# Patient Record
Sex: MALE | Race: WHITE | HISPANIC OR LATINO | Employment: OTHER | URBAN - METROPOLITAN AREA
[De-identification: names, ages, dates, MRNs, and addresses within clinical notes are randomized per-mention and may not be internally consistent; named-entity substitution may affect disease eponyms.]

---

## 2021-02-06 LAB
HEP C HIM: NORMAL
HIV 1&2 EXT: NORMAL

## 2021-05-28 ENCOUNTER — RECORDS - HEALTHEAST (OUTPATIENT)
Dept: ADMINISTRATIVE | Facility: CLINIC | Age: 66
End: 2021-05-28

## 2021-05-30 ENCOUNTER — RECORDS - HEALTHEAST (OUTPATIENT)
Dept: ADMINISTRATIVE | Facility: CLINIC | Age: 66
End: 2021-05-30

## 2021-05-31 ENCOUNTER — RECORDS - HEALTHEAST (OUTPATIENT)
Dept: ADMINISTRATIVE | Facility: CLINIC | Age: 66
End: 2021-05-31

## 2021-09-30 LAB — RETINOPATHY: NORMAL

## 2022-03-31 LAB
ALBUMIN (URINE) MG/SPEC: 9.3 MG/L
ALBUMIN/CREATININE RATIO: 10 MG/G
CHOLESTEROL (EXTERNAL): 108 MG/DL (ref 0–199)
CREATININE (URINE): 90 MG/DL
GLUCOSE (EXTERNAL): 112 MG/DL (ref 70–100)
HDLC SERPL-MCNC: 35 MG/DL
LDL CHOLESTEROL (EXTERNAL): 60 MG/DL
NON HDL CHOLESTEROL (EXTERNAL): 73 MG/DL
PHQ9 SCORE: 0
POTASSIUM (EXTERNAL): 4.6 MMOL/L (ref 3.5–5.1)
TRIGLYCERIDES (EXTERNAL): 63 MG/DL

## 2022-05-02 LAB
ALT SERPL-CCNC: 35 U/L (ref 0–55)
AST SERPL-CCNC: 24 U/L (ref 10–40)
CREATININE (EXTERNAL): 0.89 MG/DL (ref 0.73–1.18)
GFR ESTIMATED (EXTERNAL): >60 ML/MIN/1.73M2
INR (EXTERNAL): 1.2 (ref 0.9–1.1)

## 2022-07-06 ENCOUNTER — OFFICE VISIT (OUTPATIENT)
Dept: INTERNAL MEDICINE | Facility: CLINIC | Age: 67
End: 2022-07-06
Payer: COMMERCIAL

## 2022-07-06 VITALS
SYSTOLIC BLOOD PRESSURE: 147 MMHG | WEIGHT: 235.6 LBS | HEART RATE: 83 BPM | OXYGEN SATURATION: 99 % | DIASTOLIC BLOOD PRESSURE: 63 MMHG | BODY MASS INDEX: 30.05 KG/M2

## 2022-07-06 DIAGNOSIS — G25.0 TREMOR, ESSENTIAL: Primary | ICD-10-CM

## 2022-07-06 PROCEDURE — 99203 OFFICE O/P NEW LOW 30 MIN: CPT | Performed by: INTERNAL MEDICINE

## 2022-07-06 NOTE — PROGRESS NOTES
Assessment & Plan     (G25.0) Tremor, essential  (primary encounter diagnosis)  Comment: moderate-severe without much benefit from primidone as well as trial of sinemet.  Plan: Adult Neurology  Referral        He is interested in investigating options for DBS. Referral placed.                   Return in about 3 months (around 10/6/2022), or if symptoms worsen or fail to improve.  He intends to stay with his prior PCP in the  system.    Zurdo Booker MD  [facility]   Truman is a 66 year old accompanied by his spouse, presenting for the following health issues:    OTHER (Tremors-gotten worse)      History of Present Illness       Reason for visit:  Tremors    He eats 2-3 servings of fruits and vegetables daily.He consumes 0 sweetened beverage(s) daily.He exercises with enough effort to increase his heart rate 30 to 60 minutes per day.  He exercises with enough effort to increase his heart rate 3 or less days per week.   He is taking medications regularly.     He is looking for a referral for DBS at [facility] for his tremor. Has seen [place] for this but wants to stay local if possible          Review of Systems         Objective    BP (!) 147/63 (BP Location: Right arm, Patient Position: Sitting, Cuff Size: Adult Large)   Pulse 83   Wt 106.9 kg (235 lb 9.6 oz)   SpO2 99%   BMI 30.05 kg/m    Body mass index is 30.05 kg/m .  Physical Exam   Gen:nad  Neuro: postural tremor, right UE more than left. Moderate degree.                    .  ..

## 2022-07-07 ENCOUNTER — OFFICE VISIT (OUTPATIENT)
Dept: NEUROLOGY | Facility: CLINIC | Age: 67
End: 2022-07-07
Attending: INTERNAL MEDICINE
Payer: COMMERCIAL

## 2022-07-07 ENCOUNTER — PRE VISIT (OUTPATIENT)
Dept: NEUROLOGY | Facility: CLINIC | Age: 67
End: 2022-07-07

## 2022-07-07 VITALS
WEIGHT: 234.5 LBS | HEART RATE: 95 BPM | BODY MASS INDEX: 29.91 KG/M2 | SYSTOLIC BLOOD PRESSURE: 139 MMHG | DIASTOLIC BLOOD PRESSURE: 81 MMHG | OXYGEN SATURATION: 98 %

## 2022-07-07 DIAGNOSIS — Z98.890 H/O CAROTID ENDARTERECTOMY: ICD-10-CM

## 2022-07-07 DIAGNOSIS — Z13.6 SCREENING FOR HEART DISEASE: ICD-10-CM

## 2022-07-07 DIAGNOSIS — R25.1 TREMOR: ICD-10-CM

## 2022-07-07 DIAGNOSIS — G25.0 TREMOR, ESSENTIAL: ICD-10-CM

## 2022-07-07 DIAGNOSIS — R25.9 ABNORMAL INVOLUNTARY MOVEMENT: Primary | ICD-10-CM

## 2022-07-07 DIAGNOSIS — G20.A1 PARKINSON DISEASE (H): ICD-10-CM

## 2022-07-07 PROBLEM — I63.512 CEREBROVASCULAR ACCIDENT (CVA) DUE TO STENOSIS OF LEFT MIDDLE CEREBRAL ARTERY (H): Status: ACTIVE | Noted: 2019-09-20

## 2022-07-07 PROBLEM — M65.30 ACQUIRED TRIGGER FINGER: Status: ACTIVE | Noted: 2022-07-07

## 2022-07-07 PROBLEM — I65.22 STENOSIS OF LEFT CAROTID ARTERY: Status: ACTIVE | Noted: 2019-06-14

## 2022-07-07 PROBLEM — I63.9 CEREBROVASCULAR ACCIDENT (CVA), UNSPECIFIED MECHANISM (H): Status: ACTIVE | Noted: 2022-04-28

## 2022-07-07 PROBLEM — E11.9 DM TYPE 2 (DIABETES MELLITUS, TYPE 2) (H): Status: ACTIVE | Noted: 2017-02-20

## 2022-07-07 PROBLEM — G43.909 MIGRAINE HEADACHE: Status: ACTIVE | Noted: 2022-07-07

## 2022-07-07 PROBLEM — F31.10 BIPOLAR I DISORDER WITH MANIA (H): Status: ACTIVE | Noted: 2022-07-07

## 2022-07-07 PROBLEM — E66.9 OBESITY: Status: ACTIVE | Noted: 2022-07-07

## 2022-07-07 PROBLEM — D12.6 TUBULAR ADENOMA OF COLON: Status: ACTIVE | Noted: 2017-03-07

## 2022-07-07 PROBLEM — Z86.79 HISTORY OF ATRIAL FIBRILLATION: Status: ACTIVE | Noted: 2022-04-28

## 2022-07-07 PROBLEM — M19.90 DJD (DEGENERATIVE JOINT DISEASE): Status: ACTIVE | Noted: 2017-02-20

## 2022-07-07 PROBLEM — G56.00 CARPAL TUNNEL SYNDROME: Status: ACTIVE | Noted: 2022-07-07

## 2022-07-07 PROBLEM — F33.9 RECURRENT MAJOR DEPRESSIVE EPISODES (H): Status: ACTIVE | Noted: 2022-07-07

## 2022-07-07 PROBLEM — R39.9 LOWER URINARY TRACT SYMPTOMS (LUTS): Status: ACTIVE | Noted: 2022-07-07

## 2022-07-07 PROBLEM — Z79.01 ANTICOAGULATED: Status: ACTIVE | Noted: 2022-06-02

## 2022-07-07 PROBLEM — E78.5 HYPERLIPIDEMIA: Status: ACTIVE | Noted: 2017-02-20

## 2022-07-07 PROBLEM — Z86.19 HISTORY OF HEPATITIS C: Status: ACTIVE | Noted: 2017-02-20

## 2022-07-07 PROBLEM — M89.49 OSTEOARTHROSIS INVOLVING MULTIPLE SITES BUT NOT DESIGNATED AS GENERALIZED: Status: ACTIVE | Noted: 2022-07-07

## 2022-07-07 PROBLEM — J45.909 ASTHMA: Status: ACTIVE | Noted: 2022-07-07

## 2022-07-07 PROCEDURE — 99205 OFFICE O/P NEW HI 60 MIN: CPT | Performed by: PSYCHIATRY & NEUROLOGY

## 2022-07-07 PROCEDURE — 99417 PROLNG OP E/M EACH 15 MIN: CPT | Performed by: PSYCHIATRY & NEUROLOGY

## 2022-07-07 RX ORDER — ATORVASTATIN CALCIUM 40 MG/1
1 TABLET, FILM COATED ORAL DAILY
Status: ON HOLD | COMMUNITY
Start: 2021-03-22 | End: 2024-05-02

## 2022-07-07 RX ORDER — LISINOPRIL 10 MG/1
10 TABLET ORAL DAILY
COMMUNITY
Start: 2021-11-22 | End: 2022-07-07

## 2022-07-07 RX ORDER — POLYETHYLENE GLYCOL 3350 17 G/17G
17 POWDER, FOR SOLUTION ORAL DAILY PRN
COMMUNITY

## 2022-07-07 RX ORDER — GLIPIZIDE 5 MG/1
TABLET, FILM COATED, EXTENDED RELEASE ORAL
COMMUNITY
Start: 2021-08-24 | End: 2022-07-07

## 2022-07-07 RX ORDER — LISINOPRIL 20 MG/1
1 TABLET ORAL DAILY
COMMUNITY
Start: 2021-12-13 | End: 2023-02-27

## 2022-07-07 RX ORDER — INSULIN GLARGINE 100 [IU]/ML
25 INJECTION, SOLUTION SUBCUTANEOUS DAILY
COMMUNITY
Start: 2021-11-04 | End: 2022-07-07

## 2022-07-07 RX ORDER — INSULIN LISPRO 100 [IU]/ML
INJECTION, SOLUTION INTRAVENOUS; SUBCUTANEOUS
COMMUNITY
Start: 2022-07-07

## 2022-07-07 RX ORDER — LISINOPRIL 5 MG/1
5 TABLET ORAL DAILY
COMMUNITY
Start: 2021-10-12 | End: 2022-07-07

## 2022-07-07 RX ORDER — ALBUTEROL SULFATE 0.83 MG/ML
SOLUTION RESPIRATORY (INHALATION)
COMMUNITY

## 2022-07-07 RX ORDER — EPINEPHRINE 0.3 MG/.3ML
0.3 INJECTION SUBCUTANEOUS PRN
COMMUNITY
Start: 2020-07-21

## 2022-07-07 RX ORDER — RIZATRIPTAN BENZOATE 5 MG/1
TABLET, ORALLY DISINTEGRATING ORAL
COMMUNITY
Start: 2021-07-22 | End: 2022-07-07

## 2022-07-07 RX ORDER — WARFARIN SODIUM 5 MG/1
TABLET ORAL
COMMUNITY
Start: 2022-04-28 | End: 2022-07-07

## 2022-07-07 RX ORDER — INSULIN LISPRO 100 [IU]/ML
INJECTION, SOLUTION INTRAVENOUS; SUBCUTANEOUS
COMMUNITY
Start: 2021-11-22 | End: 2022-07-07

## 2022-07-07 RX ORDER — INSULIN GLARGINE 100 [IU]/ML
21 INJECTION, SOLUTION SUBCUTANEOUS AT BEDTIME
COMMUNITY
Start: 2022-07-07 | End: 2024-05-02

## 2022-07-07 RX ORDER — LORATADINE 10 MG/1
10 TABLET ORAL DAILY
COMMUNITY
End: 2022-07-07

## 2022-07-07 RX ORDER — FLUTICASONE PROPIONATE 50 MCG
2 SPRAY, SUSPENSION (ML) NASAL DAILY PRN
COMMUNITY
End: 2022-08-02

## 2022-07-07 RX ORDER — PRIMIDONE 50 MG/1
200 TABLET ORAL 2 TIMES DAILY
COMMUNITY
Start: 2022-03-23 | End: 2022-11-02

## 2022-07-07 RX ORDER — OLOPATADINE HYDROCHLORIDE 2 MG/ML
SOLUTION/ DROPS OPHTHALMIC
COMMUNITY

## 2022-07-07 RX ORDER — LITHIUM CARBONATE 300 MG/1
TABLET, FILM COATED, EXTENDED RELEASE ORAL
COMMUNITY
Start: 2022-04-18 | End: 2022-10-17 | Stop reason: SINTOL

## 2022-07-07 RX ORDER — IPRATROPIUM BROMIDE 42 UG/1
SPRAY, METERED NASAL
COMMUNITY
Start: 2022-01-19

## 2022-07-07 RX ORDER — CARBIDOPA AND LEVODOPA 25; 100 MG/1; MG/1
TABLET ORAL
COMMUNITY
Start: 2022-03-04 | End: 2022-07-07

## 2022-07-07 RX ORDER — AMITRIPTYLINE HYDROCHLORIDE 50 MG/1
TABLET ORAL
COMMUNITY
Start: 2021-09-14 | End: 2023-02-28

## 2022-07-07 RX ORDER — NEOMYCIN SULFATE, POLYMYXIN B SULFATE AND DEXAMETHASONE 3.5; 10000; 1 MG/ML; [USP'U]/ML; MG/ML
SUSPENSION/ DROPS OPHTHALMIC
COMMUNITY
Start: 2021-07-23 | End: 2022-07-07

## 2022-07-07 ASSESSMENT — PAIN SCALES - GENERAL: PAINLEVEL: NO PAIN (0)

## 2022-07-07 NOTE — PATIENT INSTRUCTIONS
"Assessment:  (R25.9) Abnormal involuntary movement  (primary encounter diagnosis)  Tremor  Family history of tremor - mother, brothers abraham and ronald and sister juanita  Monegasque    Prior stroke 2019    Brain mRI 12/11/2020  1.  No acute infarct, mass, mass effect, or hemorrhage.   2.  Minimal to mild chronic small vessel ischemia.   3.  Mild atrophy    Brain MRI ? 1/26/2022 - unable to find results  CT angio head/neck 1/26/2022 - unable to find results     Tremor - duration \"years\" with onset in his 30s  Progressively worsened in later 40s and has progressed onwards    Lithium - been on it for 30 years and not clear if there is a link.    Right handed and right hand is more affected    Review of diagnosis    Essential tremor - family history positive (does not consume alcohol)  Lithium may be causing worsening tremor    Avoidance of dopamine blockers   Not presently    Motor complication review   n/a    Review of Impulse control disorders   -     Review of surgical or medication options   Reviewed  Trial of sinemet - carbidopa/levodopa  Taking primidone  Has some lung issues which are stable     Gait/Balance/Falls   Has neuropathy    Exercise/Therapy performed/offered   Not exercising regularly     Cognitive/Driving   No problems driving   Has some forgetfulness    Mood   Depression  Polysubstance  Bipolar  Lithium level 0.6 on 4/7/2022  Amitryptyl/nortrip level of 45 on 4/7/2022    Psychiatrist Ai Buck    Monegasque - English from [facility]   Grew up in [place] and left [place] at age 24 years and moved to [place] fall 1980  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    Been on lithium for 30 years for depression - and dose has been reduced over time  He has not been on other agents for depression; however he has been on medications like trazodone and then amitriptyline and has been on this for 2 years     He had been suicidal and has not been hospitalized for mood " "issues  At age 16 years - wire attempt to hang himself  Youngest sister \"susan\" has had depressed   since 1982 or so    He has had substance problems in the past =- quit age 36 yrs  Sober of alcohol, and other substances for years  Shot heroin in [place]   Psychedelic medication  Had a dwi long time ago    From chart review 4/2022 note     Pt reports being on on lithium for over ~ 30 years and onset of tremors about ~ 20 years ago. Tremors have not worsened overtime. Did try Carbidopa recently however this caused worsening tremors and nausea so it was discontinued .    Reporting long history of insomnia. Numerous failed med trials, best response to Amitriptyline. Reporting sleep as currently stable.     Mood: \"I'm good. I'm back to my jovial self.\" Diagnosed with bipolar illness during when still struggling with substance abuse. History of grandiosity, decreased need for sleep, AH, impulsivity 7-10 days, up to 5 episodes per year and periods of depression marked by increased isolation, low motivation, low energy, and SI. Prior to current regimen. Has been stable over the years on current regimen. Did attempt to abruptly d/c Lithium on 2 occasions ( ~10-12 years ago and again about 1-2 years ago), which has resulted in mood lability and irritability about 2 weeks after d/c of Lithium.   Anxiety: stable      Past Psychiatric History:  Previous  hospitalizations: [facility] ~39 y/o   Previous med trials: fluoxetine, Abilify, Effexor, Seroquel, Wellbutrin ,Clonazepam, Adderall, Lamictal, trazodone, Doxepin, Nefazodone, Valium, Hydroxyzine, Depakote  Current med trials: Lithium, 300 mg daily and 600 mg HS, Amitriptyline, 75 mg daily   Previous outpatient psychiatry: Dr. Shreya nevarez ~20 years who lost their [place] license in 02/2013 per records, Dr. Riggins from 05/2013 to 03/2014 and once by Dr. Wood in 2016  Therapy: none current   SA: SA by hanging ~ 14 y/o   SIB: none  History of aggressive or violent " behaviour: yes as a minor     Bipolar 1 disorder (HRC)  Assessment & Plan:  Established diagnosis. On Lithium > 30 years. Dosage decrease from 600 mg BID to 300 mg AM and 600 mg HS in ~ 2013, in addition to decreasing polypharmacy by previous psychiatrist Dr. Riggins. Maintained stability on current regimen over the years, with rapid decompensation following pt's own attempt to discontinue Lithium Abruptly ~10-12 years ago and again about 1-2 years ago. Consulting psychiatry today at the recommendation of neurology d/t persisting tremor over the last ~ 20 years that have not worsened in severity. Inquiring about cross tapering lithium to a different agent, as lithium may or may not be contributing to tremors. Previous remote trial of Abilify, does not recall response. Remote trial of Depakote, not effective. Discussed indications, risks and benefits associated with long term use of Lithium. Pt with diabetes so at higher risk of developing kidney dysfunction overtime. Regular monitoring by primary per records. Recent TSH completed by neurologist WNL. [place] level at 1, however pt did take his morning dose prior to lab draw so level likely not accurate/trough level. If pt is to consider tapering, recommend very slow taper over the course of 4-6 months while gradually introducing another agent. Risk of tremors associated with many mood stabilizing agents. Slightly decreased risk with Abilify and/or Seroquel however we can not predict pt's response and risk of decompensation is high. Pt would like to defer any changes today and discuss with his spouse. Will return to clinic if/when ready to consider making a change and we will do so under close monitoring. If patient wanting to start taper prior to writer's planned medical leave in ~06/2022, I will discuss with my team and other clinic provider if they would be agreeable to follow-up with patient until my return in ~ 09/2022.    Encounter for medication  management  Assessment & Plan:  on TCA, x 1 year. Multiple previous failed trials and has responded best to amitriptyline. Given dosage, history of wide QRS, pt's age, and use of lithium, recommend Amitriptyline level and repeat EKG. Recent TSH completed by neurologist MEGHAN. [place] level at 1, however pt did take his morning dose prior to lab draw so level likely not accurate/trough level. Recommend repeat in the AM, to hold morning dose until lab draw.     Orders:  - Ecg 12-Lead Routine - MUSE; Future  - Ecg 12-Lead Routine (Lab perform); Future  - Lithium Level; Future  - Amitriptyline/Nortriptyl; Future    Insomnia, unspecified type  Assessment & Plan:  Stable on TCA, initiated about a year ago. Multiple previous failed trials and has responded best to amitriptyline. Given dosage, history of wide QRS, pt's age, and use of lithium, recommend Amitriptyline level and repeat EKG.     -denies passive or active SI with intent or plan. Denies any acute safety concerns at this time.  -If feeling unsafe, present to nearest ED or call 911   -Questions/concerns were addressed, pt verbalized understanding and is agreeable to plan.    Discussed with the patient/legal guardian my impressions and educated pt on the differential diagnosis and prognosis. I discussed with pt the risks and benefits of medications versus no interventions. Discussed indications, risks/benefits of lithium and amitriptyline, interactions between lithium and amitriptyline including risk of 5-HT syndrome. Questions/Concerns were addressed, pt/ legal guardian verbalized understanding, and is agreeable to plan.     Medications:  -continue Elavil, 75 mg daily   -continue Lithium, 300 Mg daily and 600 mg HS      Hallucinations/delusions   denies    Sleep   SANGITA - not using cpap  Sleeps in a chair because he is snoring so bad  He has not looked into inspire  Or other surgeries for sleep apnea  He has not seen someone for his sleep for a long time - had seen  "someone in [place] for this.  Been many years since   930pm goes to bed and wakes up 630 or 7am  Wakes up 1/noc and falls back asleep  Has not had \"crazy dreams for a long time\"    Bladder/Renal/Prostate/Gyn/Other   prostate - okay  Nocturia 1/noc  Denies daytime issues - but does urinate \"quite a bit\" during the day - 4 or 5 times per day  Drinking coffee and water especially because of dry mouth     GI/Constipation/GERD   History of hepatitis c - was treated with interferon  Constipation - managed with miralax prn  Denies heartburn     ENDO/Lipid/DM/Bone density/Thyroid  Diabetes mellitus  A1c was 5.6 on 3/31/2022  Lipid panel okay 3/31/2022  Lipid disorder    Has a slight neuropathy - balls of feet    Cardio/heart/Hyper or Hypotensive   Atrial fibrillation - ?intermittent  Stroke  Anticoagulated  ecg 4/7/2022 - results below  Was done in [place] - doctor ordered  Does not have a cardiologist   Has not had h eart tests     Ref Range & Units 3 mo ago   Ventricular Rate BPM 85    Atrial Rate BPM 85    P-R Interval ms 216    QRS Duration ms 116    QT ms 376    QTc ms 447    P Axis degrees 87    R Axis degrees 72    T Axis degrees 83    Resulting Agency  [facility]     Sinus rhythm with 1st degree A-V block   Cannot r/o  Septal infarct , age undetermined   Abnormal ECG   When compared with ECG of 24-DEC-2019 09:59,   Septal infarct is now Present   Confirmed by Saji Tijerina ([id number]) on 4/12/2022 10:28:49 AM        Vision/Dry Eyes/Cataracts/Glaucoma/Macular   Wears glasses  Itchy eyes with allergies  Denies cataracts  Denies retinal changes from diabetes    Heme/Anticoagulation/Antiplatelet/Anemia/Other  Anticoagulated  Anemia slight cbc 5/2/2022    ENT/Resp  Former smoker 2014  Asthma - no major problems breathing  Lung surgery - lobectomy 2014 - organizing pneumonia right middle lung and it was removed  No loss of smell    Skin/Cancer/Seborrhea/other   no skin cancer "     Musculoskeletal/Pain/Headache  Osteoarthritis   Carpal tunnel surgery  Numerous other joint surgeries - 2 right and 1 left knee surgeries  migraine    Other:  Stenosis of left carotid artery  Cerebrovascular accident (CVA) due to stenosis of left middle cerebral artery (HRC) I63.512   Intermittent A. Fib with RVR and the recent stroke lesions with CHADS2 score (high blood pressure, diabetes, and recent stroke, he also has elvi) of 4 - tolerating Zarelto fine w/o bleeding issue. No further focal neuro, TIA or CVA sx's.    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     He underwent carotid endarterectomy HD #2 and that, along with excelllent in-patient care at [facility] resulted in a very good outcome with minimal deficits.    In addition to left carotid stenosis,, his other stroke risk factors are addressed below.    He has subtle weakness in RUE in elbow flexion mid supinated and wrist extension, likely UMN though maintain vigilance for radial neuroapthy  Information provided to the patient:    6/14/19 CTA head and neck   CONCLUSION:   HEAD CT:  1.  Normal for age.  2.  No CT finding of a mass, infarct or hemorrhage  HEAD CTA:   1.  No branch vessel occlusion, high-grade stenosis, aneurysm, or high flow vascular malformation involving proximal [place] vessels.  NECK CTA:  1.  90% or greater focal stenosis in the proximal left internal carotid artery at its origin based on NASCET criteria. There is reconstitution of normal vascular caliber distal to this, with maintained patency into the head.  2.  No additional significant stenosis in the cervical vessels based on NASCET criteria.  3.  No evidence for dissection.      Medications           Albuterol proventil 2.5mg/3ml neb solution prn      Amitriptyline elavil 50mg    1.5   Atorvastatin lipitor 40mg 1      Carbidopa/levodopa Sinemet 25/100 off       Diphenhydramine benadryl 25mg off      dulaglutide trulicity 1.5mg/0.5ml sundays      Epinephrine epipen       Fluticasone flonase 50mcg/act spray No prn      Glipizide glucotrol XL 5mg 24hr off      Hypromellose-dextran artificial tears prn      Insulin glargine lantus    23 units   Insulin lispro humalog sliding      Ipratropium atrovent 0.06% spray  prn      Lisinopril zestril 20mg        Lithium ER Lithobid 300mg CR 1   2   Loratadine claritin 10mg        Olopatadine pataday 0.2% solution prn      Polyethylene glycol miralax prn      Primidone mysoline 50mg 4   4   RIvaroxaban anticoagulant xarelto 20mg  1      Rizatriptan maxalt 5mg ODT off      Warfarin coumadin 5mg  off                      Plan:    Dopamine scan (datscan) - need insurance approval and needs review of medication that you are taking and if they are interfering with the scan.    pharmacy review - see janusz's note below - as well as psychiatrist Ai Buck  Review of lithium, amitriptyline and proproposed antidopamine/antipsychotic aripiprazole/abilify.     He has been stable from a mood point of view with the lithium - with good renal function and thyroid function.     Diabetes with good management A1C    Anticoagulated due to intermittent atrial fibrillation - ecg was NSR in April with possible septal infarct vs lead  Had normal QTC  ecg today to monitor medication and review findings.  Consideration for echo, etc. If appropriate with PCP  - he is asymptomatic.     Prior stroke 2019    dbs information from Orlin Hearn MA and  Genesis Lucia RN    Neuropsychological evaluation - to be scheduled - 1 hour interview and cognitive testing.     Need access to recent imaging if done 2022; had imaging done in 2022    Goal right hand improved  With dbs - possibly rechargeable     3T MRI of brain to be done at [facility] for DBS work up for tremor patients:  T1 sequence should be done WITH and WITHOUT contrast; T2 sequences should be done WITHOUT  contrast.   Thin cuts, no spaces please. Assess for loss of swallow tail sign.      Brain mri with and without for DBS for tremor workup    Ct angiogram to followup on his carotid endarterectomy 2019    Ongoing sleep apnea issues     Neurosurgical consultation    Videotaping with tremor and updrs rating scales as he has a history of shuffling and reduced left arm swing.     Return

## 2022-07-07 NOTE — PROGRESS NOTES
"          Diagnosis/Summary/Recommendations:    PATIENT: Truman Reynolds  66 year old male     : 1955    HAKAN: 2022    MRN: [id number]    [address]   [facility] [address]     [phone] (H)   [phone] (W)   [email or web address]    Graciela Reynolds spouse  [phone]     Julio César Hayward MD (pril 2022 - Present)  [phone] (Work)  [phone] (Fax)  [address] [place] [address]  [facility]  [address], [place] [address]     Consent for [facility] records      Assessment:  (R25.9) Abnormal involuntary movement  (primary encounter diagnosis)  Tremor  Family history of tremor - mother, brothers abraham and ronald and sister juanita  Critical access hospital    Prior stroke     Brain mRI 2020  1.  No acute infarct, mass, mass effect, or hemorrhage.   2.  Minimal to mild chronic small vessel ischemia.   3.  Mild atrophy    Brain MRI ? 2022 - unable to find results  CT angio head/neck 2022 - unable to find results     Tremor - duration \"years\" with onset in his 30s  Progressively worsened in later 40s and has progressed onwards    Lithium - been on it for 30 years and not clear if there is a link.    Right handed and right hand is more affected    Review of diagnosis    Essential tremor - family history positive (does not consume alcohol)  Lithium may be causing worsening tremor    Avoidance of dopamine blockers   Not presently    Motor complication review   n/a    Review of Impulse control disorders   -     Review of surgical or medication options   Reviewed  Trial of sinemet - carbidopa/levodopa  Taking primidone  Has some lung issues which are stable     Gait/Balance/Falls   Has neuropathy    Exercise/Therapy performed/offered   Not exercising regularly     Cognitive/Driving   No problems driving   Has some forgetfulness    Mood   Depression  Polysubstance  Bipolar  Lithium level 0.6 on 2022  Amitryptyl/nortrip level " "of 45 on 4/7/2022    Psychiatrist Ai Buck    Jess - [facility]   Grew up in [place] and left [place] age 24 years and moved to [place] fall 1980  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    Been on lithium for 30 years for depression - and dose has been reduced over time  He has not been on other agents for depression; however he has been on medications like trazodone and then amitriptyline and has been on this for 2 years     He had been suicidal and has not been hospitalized for mood issues  At age 16 years - wire attempt to hang himself  Youngest sister \"susan\" has had depressed   since 1982 or so    He has had substance problems in the past =- quit age 36 yrs  Sober of alcohol, and other substances for years  Shot heroin in [place]   Psychedelic medication  Had a dwi long time ago    From chart review 4/2022 note     Pt reports being on on lithium for over ~ 30 years and onset of tremors about ~ 20 years ago. Tremors have not worsened overtime. Did try Carbidopa recently however this caused worsening tremors and nausea so it was discontinued .    Reporting long history of insomnia. Numerous failed med trials, best response to Amitriptyline. Reporting sleep as currently stable.     Mood: \"I'm good. I'm back to my jovial self.\" Diagnosed with bipolar illness during when still struggling with substance abuse. History of grandiosity, decreased need for sleep, AH, impulsivity 7-10 days, up to 5 episodes per year and periods of depression marked by increased isolation, low motivation, low energy, and SI. Prior to current regimen. Has been stable over the years on current regimen. Did attempt to abruptly d/c Lithium on 2 occasions ( ~10-12 years ago and again about 1-2 years ago), which has resulted in mood lability and irritability about 2 weeks after d/c of Lithium.   Anxiety: stable      Past Psychiatric History:  Previous  hospitalizations: " [facility] ~37 y/o   Previous med trials: fluoxetine, Abilify, Effexor, Seroquel, Wellbutrin ,Clonazepam, Adderall, Lamictal, trazodone, Doxepin, Nefazodone, Valium, Hydroxyzine, Depakote  Current med trials: Lithium, 300 mg daily and 600 mg HS, Amitriptyline, 75 mg daily   Previous outpatient psychiatry: Dr. Shreya nevarez ~20 years who lost their [place] license in 02/2013 per records, Dr. Riggins from 05/2013 to 03/2014 and once by Dr. Wood in 2016  Therapy: none current   SA: SA by hanging ~ 14 y/o   SIB: none  History of aggressive or violent behaviour: yes as a minor     Bipolar 1 disorder (HRC)  Assessment & Plan:  Established diagnosis. On Lithium > 30 years. Dosage decrease from 600 mg BID to 300 mg AM and 600 mg HS in ~ 2013, in addition to decreasing polypharmacy by previous psychiatrist Dr. Riggins. Maintained stability on current regimen over the years, with rapid decompensation following pt's own attempt to discontinue Lithium Abruptly ~10-12 years ago and again about 1-2 years ago. Consulting psychiatry today at the recommendation of neurology d/t persisting tremor over the last ~ 20 years that have not worsened in severity. Inquiring about cross tapering lithium to a different agent, as lithium may or may not be contributing to tremors. Previous remote trial of Abilify, does not recall response. Remote trial of Depakote, not effective. Discussed indications, risks and benefits associated with long term use of Lithium. Pt with diabetes so at higher risk of developing kidney dysfunction overtime. Regular monitoring by primary per records. Recent TSH completed by neurologist WNL. [place] level at 1, however pt did take his morning dose prior to lab draw so level likely not accurate/trough level. If pt is to consider tapering, recommend very slow taper over the course of 4-6 months while gradually introducing another agent. Risk of tremors associated with many mood stabilizing agents. Slightly decreased risk  with Abilify and/or Seroquel however we can not predict pt's response and risk of decompensation is high. Pt would like to defer any changes today and discuss with his spouse. Will return to clinic if/when ready to consider making a change and we will do so under close monitoring. If patient wanting to start taper prior to writer's planned medical leave in ~06/2022, I will discuss with my team and other clinic provider if they would be agreeable to follow-up with patient until my return in ~ 09/2022.    Encounter for medication management  Assessment & Plan:  on TCA, x 1 year. Multiple previous failed trials and has responded best to amitriptyline. Given dosage, history of wide QRS, pt's age, and use of lithium, recommend Amitriptyline level and repeat EKG. Recent TSH completed by neurologist MEGHAN. [place] level at 1, however pt did take his morning dose prior to lab draw so level likely not accurate/trough level. Recommend repeat in the AM, to hold morning dose until lab draw.     Orders:  - Ecg 12-Lead Routine - MUSE; Future  - Ecg 12-Lead Routine (Lab perform); Future  - Lithium Level; Future  - Amitriptyline/Nortriptyl; Future    Insomnia, unspecified type  Assessment & Plan:  Stable on TCA, initiated about a year ago. Multiple previous failed trials and has responded best to amitriptyline. Given dosage, history of wide QRS, pt's age, and use of lithium, recommend Amitriptyline level and repeat EKG.     -denies passive or active SI with intent or plan. Denies any acute safety concerns at this time.  -If feeling unsafe, present to nearest ED or call 911   -Questions/concerns were addressed, pt verbalized understanding and is agreeable to plan.    Discussed with the patient/legal guardian my impressions and educated pt on the differential diagnosis and prognosis. I discussed with pt the risks and benefits of medications versus no interventions. Discussed indications, risks/benefits of lithium and amitriptyline,  "interactions between lithium and amitriptyline including risk of 5-HT syndrome. Questions/Concerns were addressed, pt/ legal guardian verbalized understanding, and is agreeable to plan.     Medications:  -continue Elavil, 75 mg daily   -continue Lithium, 300 Mg daily and 600 mg HS      Hallucinations/delusions   denies    Sleep   SANGITA - not using cpap  Sleeps in a chair because he is snoring so bad  He has not looked into inspire  Or other surgeries for sleep apnea  He has not seen someone for his sleep for a long time - had seen someone in [place] for this.  Been many years since   930pm goes to bed and wakes up 630 or 7am  Wakes up 1/noc and falls back asleep  Has not had \"crazy dreams for a long time\"    Bladder/Renal/Prostate/Gyn/Other   prostate - okay  Nocturia 1/noc  Denies daytime issues - but does urinate \"quite a bit\" during the day - 4 or 5 times per day  Drinking coffee and water especially because of dry mouth     GI/Constipation/GERD   History of hepatitis c - was treated with interferon  Constipation - managed with miralax prn  Denies heartburn     ENDO/Lipid/DM/Bone density/Thyroid  Diabetes mellitus  A1c was 5.6 on 3/31/2022  Lipid panel okay 3/31/2022  Lipid disorder    Has a slight neuropathy - balls of feet    Cardio/heart/Hyper or Hypotensive   Atrial fibrillation - ?intermittent  Stroke  Anticoagulated  ecg 4/7/2022 - results below  Was done in [place] - doctor ordered  Does not have a cardiologist   Has not had h eart tests     Ref Range & Units 3 mo ago   Ventricular Rate BPM 85    Atrial Rate BPM 85    P-R Interval ms 216    QRS Duration ms 116    QT ms 376    QTc ms 447    P Axis degrees 87    R Axis degrees 72    T Axis degrees 83    Resulting Agency  [facility]     Sinus rhythm with 1st degree A-V block   Cannot r/o  Septal infarct , age undetermined   Abnormal ECG   When compared with ECG of 24-DEC-2019 09:59,   Septal infarct is now Present   Confirmed by Saji Tijerina ([id number]) on " 4/12/2022 10:28:49 AM        Vision/Dry Eyes/Cataracts/Glaucoma/Macular   Wears glasses  Itchy eyes with allergies  Denies cataracts  Denies retinal changes from diabetes    Heme/Anticoagulation/Antiplatelet/Anemia/Other  Anticoagulated  Anemia slight cbc 5/2/2022    ENT/Resp  Former smoker 2014  Asthma - no major problems breathing  Lung surgery - lobectomy 2014 - organizing pneumonia right middle lung and it was removed  No loss of smell    Skin/Cancer/Seborrhea/other   no skin cancer     Musculoskeletal/Pain/Headache  Osteoarthritis   Carpal tunnel surgery  Numerous other joint surgeries - 2 right and 1 left knee surgeries  migraine    Other:  Stenosis of left carotid artery  Cerebrovascular accident (CVA) due to stenosis of left middle cerebral artery (HRC) I63.512   Intermittent A. Fib with RVR and the recent stroke lesions with CHADS2 score (high blood pressure, diabetes, and recent stroke, he also has elvi) of 4 - tolerating Zarelto fine w/o bleeding issue. No further focal neuro, TIA or CVA sx's.    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     He underwent carotid endarterectomy HD #2 and that, along with excelllent in-patient care at [facility] resulted in a very good outcome with minimal deficits.    In addition to left carotid stenosis,, his other stroke risk factors are addressed below.    He has subtle weakness in RUE in elbow flexion mid supinated and wrist extension, likely UMN though maintain vigilance for radial neuroapthy  Information provided to the patient:    6/14/19 CTA head and neck   CONCLUSION:   HEAD CT:  1.  Normal for age.  2.  No CT finding of a mass, infarct or hemorrhage  HEAD CTA:   1.  No branch vessel occlusion, high-grade stenosis, aneurysm, or high flow vascular malformation involving proximal [place] vessels.  NECK CTA:  1.  90% or greater focal stenosis in the proximal  left internal carotid artery at its origin based on NASCET criteria. There is reconstitution of normal vascular caliber distal to this, with maintained patency into the head.  2.  No additional significant stenosis in the cervical vessels based on NASCET criteria.  3.  No evidence for dissection.      Medications           Albuterol proventil 2.5mg/3ml neb solution prn      Amitriptyline elavil 50mg    1.5   Atorvastatin lipitor 40mg 1      Carbidopa/levodopa Sinemet 25/100 off      Diphenhydramine benadryl 25mg off      dulaglutide trulicity 1.5mg/0.5ml sundays      Epinephrine epipen       Fluticasone flonase 50mcg/act spray No prn      Glipizide glucotrol XL 5mg 24hr off      Hypromellose-dextran artificial tears prn      Insulin glargine lantus    23 units   Insulin lispro humalog sliding      Ipratropium atrovent 0.06% spray  prn      Lisinopril zestril 20mg        Lithium ER Lithobid 300mg CR 1   2   Loratadine claritin 10mg        Olopatadine pataday 0.2% solution prn      Polyethylene glycol miralax prn      Primidone mysoline 50mg 4   4   RIvaroxaban anticoagulant xarelto 20mg  1      Rizatriptan maxalt 5mg ODT off      Warfarin coumadin 5mg  off                      Plan:    Dopamine scan (datscan) - need insurance approval and needs review of medication that you are taking and if they are interfering with the scan.    pharmacy review - see janusz's note below - as well as psychiatrist Ai Buck  Review of lithium, amitriptyline and proproposed antidopamine/antipsychotic aripiprazole/abilify.     He has been stable from a mood point of view with the lithium - with good renal function and thyroid function.     Diabetes with good management A1C    Anticoagulated due to intermittent atrial fibrillation - ecg was NSR in April with possible septal infarct vs lead  Had normal QTC  ecg today to monitor medication and review findings.  Consideration for echo, etc. If appropriate with PCP  - he is  asymptomatic.     Prior stroke 2019    dbs information from Orlin Hearn MA and  Genesis Lucia, RN    Neuropsychological evaluation - to be scheduled - 1 hour interview and cognitive testing.     Need access to recent imaging if done 2022; had imaging done in 2022    Goal right hand improved  With dbs - possibly rechargeable     3T MRI of brain to be done at [facility] for DBS work up for tremor patients:  T1 sequence should be done WITH and WITHOUT contrast; T2 sequences should be done WITHOUT contrast.   Thin cuts, no spaces please. Assess for loss of swallow tail sign.      Brain mri with and without for DBS for tremor workup    Ct angiogram to followup on his carotid endarterectomy 2019    Ongoing sleep apnea issues     Neurosurgical consultation    Videotaping with tremor and updrs rating scales as he has a history of shuffling and reduced left arm swing.     Return       Coding statement:   Medical Decision Making:  #  Chronic progressive medical conditions addressed  - see above --   Review and/or interpretation of unique test or documentation from a provider outside of neurology reviewed   Independent historian provided additional details  yes  Prescription drug management and review of potential side effects and/or monitoring for side effects  -- see above ---  Health impacted by social determinants of health  no    I have reviewed the note as documented above.  This accurately captures the substance of my conversation with the patient and total time spent preparing for visit, executing visit and completing visit on the day of the visit:  90 minutes.  The portion of this total time included face to face time  11am - 1205a -  Additional prep time today prior to visit, etc.     Junaid Barrett MD     ______________________________________    Last visit date and details:             ______________________________________      Patient was asked about 14 Review of systems including changes in vision (dry eyes, double vision),  hearing, heart, lungs, musculoskeletal, depression, anxiety, snoring, RBD, insomnia, urinary frequency, urinary urgency, constipation, swallowing problems, hematological, ID, allergies, skin problems: seborrhea, endocrinological: thyroid, diabetes, cholesterol; balance, weight changes, and other neurological problems and these were not significant at this time except for   Patient Active Problem List   Diagnosis     Anticoagulated     Lower urinary tract symptoms (LUTS)     Hyperlipidemia     Abnormal involuntary movement     Asthma     Bipolar 1 disorder (H)     Bipolar I disorder with rambo (H)     Carpal tunnel syndrome     Acquired trigger finger     Cerebrovascular accident (CVA) due to stenosis of left middle cerebral artery (H)     Cerebrovascular accident (CVA), unspecified mechanism (H)     DJD (degenerative joint disease)     DM type 2 (diabetes mellitus, type 2) (H)     History of atrial fibrillation     Familial tremor     History of hepatitis C     Insomnia     Intermittent atrial fibrillation (H)     Obesity     Migraine headache     SANGITA (obstructive sleep apnea)     Osteoarthrosis involving multiple sites but not designated as generalized     Polysubstance dependence (H)     Recurrent major depressive episodes (H)     S/P lobectomy of lung     Stenosis of left carotid artery     Tubular adenoma of colon          Allergies   Allergen Reactions     Bee Venom Anaphylaxis and Swelling     Chicken-Derived Products (Egg)      Other reaction(s): Gastrointestinal, Other (see comments)  Can eat egg as ingredient, but egg by itself creates bad GI upset  Can eat egg as ingredient, but egg by itself creates bad GI upset       Aspirin Other (See Comments) and Nausea     Other reaction(s): GI intolerance  tolerates 81 mg EC tablets  GI upset, tolerates 81 mg EC tablets       Empagliflozin Other (See Comments)     Other reaction(s): Other (see comments)  UTI, Yeast infection  UTI, Yeast infection       Levofloxacin  "Other (See Comments)     Other reaction(s): Other (see comments)  Swelling of arm tendons  Swelling of arm tendons       Metformin      Other reaction(s): Gastrointestinal, GI intolerance     Norfloxacin Other (See Comments)     Other reaction(s): Other (see comments)  Severe headache  Severe headache       Oxycodone Other (See Comments)     Other reaction(s): Other (see comments)  \"Feels Weird\"  \"Feels Weird\"       Penicillins Rash     blotchy, hives  blotchy, hives       Past Surgical History:   Procedure Laterality Date     colonoscopy  2009     COLONOSCOPY  03/07/2017    F/U 5 yrs.Polyp on the right side benign tubular adenoma 3-4 mm.Bx at 50 cm benign tissue no true polyp. Polyp at 30 cm benign hyperplastic polyp.Mild diverticulosis.     EGD  2017    F/U PRN. Duodenum and stomach normal. Esophagus mild inflammation.Dilated esophagus to 20 mm using balloon.     KNEE SURGERY Left 2009    [facility]     KNEE SURGERY Right      KNEE SURGERY Right 01/21/2020    revision     LUNG SURGERY  10/27/2014    right M lung lobectomy at [facility]     History reviewed. No pertinent past medical history.  Social History     Socioeconomic History     Marital status:      Spouse name: Not on file     Number of children: Not on file     Years of education: Not on file     Highest education level: Not on file   Occupational History     Not on file   Tobacco Use     Smoking status: Former Smoker     Smokeless tobacco: Never Used     Tobacco comment: former smoker 2014   Substance and Sexual Activity     Alcohol use: Not on file     Drug use: Not Currently     Sexual activity: Not on file   Other Topics Concern     Not on file   Social History Narrative     Not on file     Social Determinants of Health     Financial Resource Strain: Not on file   Food Insecurity: Not on file   Transportation Needs: Not on file   Physical Activity: Not on file   Stress: Not on file   Social Connections: Not on file   Intimate Partner Violence: Not on " file   Housing Stability: Not on file       Drug and lactation database from the [place] National Library of Medicine:  http://toxnet.nlm.nih.gov/cgi-bin/sis/htmlgen?LACT      B/P: Data Unavailable, T: Data Unavailable, P: Data Unavailable, R: Data Unavailable 0 lbs 0 oz  There were no vitals taken for this visit., There is no height or weight on file to calculate BMI.  Medications and Vitals not listed above were documented in the cart and reviewed by me.     Current Outpatient Medications   Medication Sig Dispense Refill     insulin glargine (LANTUS SOLOSTAR) 100 UNIT/ML pen Inject 23 Units Subcutaneous daily       insulin lispro (HUMALOG KWIKPEN) 100 UNIT/ML (1 unit dial) KWIKPEN Inject 5 Units subcutaneously three times daily before meals. Plus correction scale, Target 150, Sensitivity 50, TDD= 25 units       albuterol (PROVENTIL) (2.5 MG/3ML) 0.083% neb solution 2.5 mg by Nebulization route every 6 hours as needed for Wheezing.       amitriptyline (ELAVIL) 50 MG tablet TAKE 1 & 1/2 (ONE & ONE-HALF) TABLETS BY MOUTH AT BEDTIME       atorvastatin (LIPITOR) 40 MG tablet Take 1 tablet by mouth daily       carbidopa-levodopa (SINEMET)  MG tablet Take at least one hour before and at least 2 hours after the end of meals. Start with one tablet 3 times daily and increase weekly per instructions up to 2.5 tabs each dose.       diphenhydrAMINE (BENADRYL) 25 MG tablet Take 25 mg by mouth every 6 hours as needed       dulaglutide (TRULICITY) 1.5 MG/0.5ML pen Inject 1.5 mg Subcutaneous once a week       EPINEPHrine (ANY BX GENERIC EQUIV) 0.3 MG/0.3ML injection 2-pack Inject 0.3 mg into the muscle       fluticasone (FLONASE) 50 MCG/ACT nasal spray 2 sprays       glipiZIDE (GLUCOTROL XL) 5 MG 24 hr tablet TAKE 2 TABLETS BY MOUTH ONCE DAILY       hypromellose-dextran (ARTIFICAL TEARS) 0.1-0.3 % ophthalmic solution Place 1-2 Drops into both eyes 4 times daily as needed.       insulin glargine (LANTUS SOLOSTAR) 100  UNIT/ML pen Inject 25 Units Subcutaneous daily       insulin lispro (HUMALOG KWIKPEN) 100 UNIT/ML (1 unit dial) KWIKPEN INJECT 7 UNITS AT BREAKFAST, 5 UNITS AT LUNCH, AND 5 UNITS AT SUPPER, PLUS CORRECTION SCALE TARGET 150, SENSITIVITY 50, TDD= 25 UNITS       ipratropium (ATROVENT) 0.06 % nasal spray Place 2 Sprays into both nostrils 4 times daily as needed for Allergies.       lisinopril (ZESTRIL) 10 MG tablet Take 10 mg by mouth daily       lisinopril (ZESTRIL) 20 MG tablet Take 1 tablet by mouth daily       lisinopril (ZESTRIL) 5 MG tablet Take 5 mg by mouth daily       lithium ER (LITHOBID) 300 MG CR tablet TAKE 1 TABLET BY MOUTH IN THE MORNING AND 2 AT BEDTIME       loratadine (CLARITIN) 10 MG tablet Take 10 mg by mouth daily       olopatadine (PATADAY) 0.2 % ophthalmic solution Place 1 Drop into both eyes daily.       polyethylene glycol (MIRALAX) 17 GM/Dose powder Take 17 g by mouth daily as needed       primidone (MYSOLINE) 50 MG tablet Take 200 mg by mouth daily       rivaroxaban ANTICOAGULANT (XARELTO) 20 MG TABS tablet Take 20 mg by mouth daily       rizatriptan (MAXALT-MLT) 5 MG ODT DISSOLVE 1 TABLET IN MOUTH AS NEEDED FOR HEADACHE. AT ONSET OF MIGRAINE MAY REPEAT IN 2 HOURS IF NEEDED UP TO 6 TABLETS IN 24 HOURS. MAX USE OF 5 DAYS PER MONTH.       warfarin ANTICOAGULANT (COUMADIN) 5 MG tablet            RICHELLE Schumacher MD, M.D., Ph.D. - 03/04/2022 8:00 AM CST  Formatting of this note might be different from the original.  Mr. Reynolds was accompanied by his wife. He came for evaluation of hand tremor. His hand tremor started at least 20 years ago and has gradually been getting worse. It is more prominent in the right hand. His neurologist, Dr. Lee, in [place] started him on primidone and his current dose is 200 mg twice daily. He recently tried going up to 250 mg doses but this was not tolerated. He has albuterol on his med list and hence has not been prescribed a non-selective  beta-blocker.    Other medical problems include atrial fibrillation (on Xarelto); type 2 diabetes mellitus currently well controlled; bipolar disorder for many years on chronic lithium. He has a history of a left carotid endarterectomy done after the development of stroke-like symptoms. That had an excellent outcome. He is had bilateral TKAs surgeries. He also was had a partial lung resection because of an organizing pneumonia with subsequent development of fibrosis. At present, he is doing excellently despite this list of medical issues.     He has a family history of hand tremor in his mother and 2 brothers.    He is right handed in the tremor is substantially worse in the right hand.     We discussed the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

## 2022-07-07 NOTE — TELEPHONE ENCOUNTER
Action 7/7/22 MV 6.55am   Action Taken Imaging request faxed to [facility]  UPDATE:  [facility] images resolved in PACS  [facility] images resolved in PACS         RECORDS RECEIVED FROM: internal   REASON FOR VISIT: Tremor, essential    Date of Appt: 7/7/22   NOTES (FOR ALL VISITS) STATUS DETAILS   OFFICE NOTE from referring provider Internal Dr Zurdo Booker @ [facility] PCP:  7/6/22   OFFICE NOTE from other specialist Care Everywhere Dr Anant Freeman @ [facility]:  3/4/22    Dr Jordan Lee @  Neuro:  2/15/22  5/11/21  12/9/20  6/1/20   DISCHARGE SUMMARY from hospital Care Everywhere [facility]:  6/14/19   MEDICATION LIST Internal    IMAGING  (FOR ALL VISITS)     MRI (HEAD, NECK, SPINE) Received [facility]:  MRI Brain 12/11/20    [facility]:  MRI Brain 6/14/19   CT (HEAD, NECK, SPINE) Received [facility]:  CTA Head Neck 6/14/19

## 2022-07-07 NOTE — LETTER
"2022       RE: Truman Reynolds  [address] [facility] [address]     Dear Colleague,    Thank you for referring your patient, Truman Reynolds, to the [facility] NEUROLOGY CLINIC [place] at [facility]. Please see a copy of my visit note below.      Diagnosis/Summary/Recommendations:    PATIENT: Truman Reynolds  66 year old male     : 1955    HAKAN: 2022    MRN: [id number]    [address]   [facility] [address]     [phone] (H)   [phone] (W)   [email or web address]    Graciela Reynolds spouse  [phone]     Julio César Hayward MD (pril 2022 - Present)  [phone] (Work)  [phone] (Fax)  [address] [place] [id number]  [place] [address]     Consent for [facility] records      Assessment:  (R25.9) Abnormal involuntary movement  (primary encounter diagnosis)  Tremor  Family history of tremor - mother, brothers abraham and ronald and sister juanita  Jess    Prior stroke 2019    Brain mRI 2020  1.  No acute infarct, mass, mass effect, or hemorrhage.   2.  Minimal to mild chronic small vessel ischemia.   3.  Mild atrophy    Brain MRI ? 2022 - unable to find results  CT angio head/neck 2022 - unable to find results     Tremor - duration \"years\" with onset in his 30s  Progressively worsened in later 40s and has progressed onwards    Lithium - been on it for 30 years and not clear if there is a link.    Right handed and right hand is more affected    Review of diagnosis    Essential tremor - family history positive (does not consume alcohol)  Lithium may be causing worsening tremor    Avoidance of dopamine blockers   Not presently    Motor complication review   n/a    Review of Impulse control disorders   -     Review of surgical or medication options   Reviewed  Trial of sinemet - carbidopa/levodopa  Taking primidone  Has some " "lung issues which are stable     Gait/Balance/Falls   Has neuropathy    Exercise/Therapy performed/offered   Not exercising regularly     Cognitive/Driving   No problems driving   Has some forgetfulness    Mood   Depression  Polysubstance  Bipolar  Lithium level 0.6 on 4/7/2022  Amitryptyl/nortrip level of 45 on 4/7/2022    Psychiatrist Ai Buck    Welsh - Ecuadorean [facility]   Grew up in [place] and left [place] at age 24 years and moved to [place] fall 1980  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    Been on lithium for 30 years for depression - and dose has been reduced over time  He has not been on other agents for depression; however he has been on medications like trazodone and then amitriptyline and has been on this for 2 years     He had been suicidal and has not been hospitalized for mood issues  At age 16 years - wire attempt to hang himself  Youngest sister \"susan\" has had depressed   since 1982 or so    He has had substance problems in the past =- quit age 36 yrs  Sober of alcohol, and other substances for years  Shot heroin in [place]   Psychedelic medication  Had a dwi long time ago    From chart review 4/2022 note     Pt reports being on on lithium for over ~ 30 years and onset of tremors about ~ 20 years ago. Tremors have not worsened overtime. Did try Carbidopa recently however this caused worsening tremors and nausea so it was discontinued .    Reporting long history of insomnia. Numerous failed med trials, best response to Amitriptyline. Reporting sleep as currently stable.     Mood: \"I'm good. I'm back to my jovial self.\" Diagnosed with bipolar illness during when still struggling with substance abuse. History of grandiosity, decreased need for sleep, AH, impulsivity 7-10 days, up to 5 episodes per year and periods of depression marked by increased isolation, low motivation, low energy, and SI. Prior to current regimen. Has been stable " over the years on current regimen. Did attempt to abruptly d/c Lithium on 2 occasions ( ~10-12 years ago and again about 1-2 years ago), which has resulted in mood lability and irritability about 2 weeks after d/c of Lithium.   Anxiety: stable      Past Psychiatric History:  Previous  hospitalizations: [facility] ~39 y/o   Previous med trials: fluoxetine, Abilify, Effexor, Seroquel, Wellbutrin ,Clonazepam, Adderall, Lamictal, trazodone, Doxepin, Nefazodone, Valium, Hydroxyzine, Depakote  Current med trials: Lithium, 300 mg daily and 600 mg HS, Amitriptyline, 75 mg daily   Previous outpatient psychiatry: Dr. Shreya nevarez ~20 years who lost their [place] license in 02/2013 per records, Dr. Riggins from 05/2013 to 03/2014 and once by Dr. Wood in 2016  Therapy: none current   SA: SA by hanging ~ 16 y/o   SIB: none  History of aggressive or violent behaviour: yes as a minor     Bipolar 1 disorder (HRC)  Assessment & Plan:  Established diagnosis. On Lithium > 30 years. Dosage decrease from 600 mg BID to 300 mg AM and 600 mg HS in ~ 2013, in addition to decreasing polypharmacy by previous psychiatrist Dr. Riggins. Maintained stability on current regimen over the years, with rapid decompensation following pt's own attempt to discontinue Lithium Abruptly ~10-12 years ago and again about 1-2 years ago. Consulting psychiatry today at the recommendation of neurology d/t persisting tremor over the last ~ 20 years that have not worsened in severity. Inquiring about cross tapering lithium to a different agent, as lithium may or may not be contributing to tremors. Previous remote trial of Abilify, does not recall response. Remote trial of Depakote, not effective. Discussed indications, risks and benefits associated with long term use of Lithium. Pt with diabetes so at higher risk of developing kidney dysfunction overtime. Regular monitoring by primary per records. Recent TSH completed by neurologist WNL. [place] level at 1, however  pt did take his morning dose prior to lab draw so level likely not accurate/trough level. If pt is to consider tapering, recommend very slow taper over the course of 4-6 months while gradually introducing another agent. Risk of tremors associated with many mood stabilizing agents. Slightly decreased risk with Abilify and/or Seroquel however we can not predict pt's response and risk of decompensation is high. Pt would like to defer any changes today and discuss with his spouse. Will return to clinic if/when ready to consider making a change and we will do so under close monitoring. If patient wanting to start taper prior to writer's planned medical leave in ~06/2022, I will discuss with my team and other clinic provider if they would be agreeable to follow-up with patient until my return in ~ 09/2022.    Encounter for medication management  Assessment & Plan:  on TCA, x 1 year. Multiple previous failed trials and has responded best to amitriptyline. Given dosage, history of wide QRS, pt's age, and use of lithium, recommend Amitriptyline level and repeat EKG. Recent TSH completed by neurologist MEGHAN. [place] level at 1, however pt did take his morning dose prior to lab draw so level likely not accurate/trough level. Recommend repeat in the AM, to hold morning dose until lab draw.     Orders:  - Ecg 12-Lead Routine - MUSE; Future  - Ecg 12-Lead Routine (Lab perform); Future  - Lithium Level; Future  - Amitriptyline/Nortriptyl; Future    Insomnia, unspecified type  Assessment & Plan:  Stable on TCA, initiated about a year ago. Multiple previous failed trials and has responded best to amitriptyline. Given dosage, history of wide QRS, pt's age, and use of lithium, recommend Amitriptyline level and repeat EKG.     -denies passive or active SI with intent or plan. Denies any acute safety concerns at this time.  -If feeling unsafe, present to nearest ED or call 911   -Questions/concerns were addressed, pt verbalized  "understanding and is agreeable to plan.    Discussed with the patient/legal guardian my impressions and educated pt on the differential diagnosis and prognosis. I discussed with pt the risks and benefits of medications versus no interventions. Discussed indications, risks/benefits of lithium and amitriptyline, interactions between lithium and amitriptyline including risk of 5-HT syndrome. Questions/Concerns were addressed, pt/ legal guardian verbalized understanding, and is agreeable to plan.     Medications:  -continue Elavil, 75 mg daily   -continue Lithium, 300 Mg daily and 600 mg HS      Hallucinations/delusions   denies    Sleep   SANGITA - not using cpap  Sleeps in a chair because he is snoring so bad  He has not looked into inspire  Or other surgeries for sleep apnea  He has not seen someone for his sleep for a long time - had seen someone in [place] for this.  Been many years since   930pm goes to bed and wakes up 630 or 7am  Wakes up 1/noc and falls back asleep  Has not had \"crazy dreams for a long time\"    Bladder/Renal/Prostate/Gyn/Other   prostate - okay  Nocturia 1/noc  Denies daytime issues - but does urinate \"quite a bit\" during the day - 4 or 5 times per day  Drinking coffee and water especially because of dry mouth     GI/Constipation/GERD   History of hepatitis c - was treated with interferon  Constipation - managed with miralax prn  Denies heartburn     ENDO/Lipid/DM/Bone density/Thyroid  Diabetes mellitus  A1c was 5.6 on 3/31/2022  Lipid panel okay 3/31/2022  Lipid disorder    Has a slight neuropathy - balls of feet    Cardio/heart/Hyper or Hypotensive   Atrial fibrillation - ?intermittent  Stroke  Anticoagulated  ecg 4/7/2022 - results below  Was done in [place] - doctor ordered  Does not have a cardiologist   Has not had h eart tests     Ref Range & Units 3 mo ago   Ventricular Rate BPM 85    Atrial Rate BPM 85    P-R Interval ms 216    QRS Duration ms 116    QT ms 376    QTc ms 447    P Axis " degrees 87    R Axis degrees 72    T Axis degrees 83    Resulting Agency  [facility]     Sinus rhythm with 1st degree A-V block   Cannot r/o  Septal infarct , age undetermined   Abnormal ECG   When compared with ECG of 24-DEC-2019 09:59,   Septal infarct is now Present   Confirmed by Saji Tijerina ([id number]) on 4/12/2022 10:28:49 AM        Vision/Dry Eyes/Cataracts/Glaucoma/Macular   Wears glasses  Itchy eyes with allergies  Denies cataracts  Denies retinal changes from diabetes    Heme/Anticoagulation/Antiplatelet/Anemia/Other  Anticoagulated  Anemia slight cbc 5/2/2022    ENT/Resp  Former smoker 2014  Asthma - no major problems breathing  Lung surgery - lobectomy 2014 - organizing pneumonia right middle lung and it was removed  No loss of smell    Skin/Cancer/Seborrhea/other   no skin cancer     Musculoskeletal/Pain/Headache  Osteoarthritis   Carpal tunnel surgery  Numerous other joint surgeries - 2 right and 1 left knee surgeries  migraine    Other:  Stenosis of left carotid artery  Cerebrovascular accident (CVA) due to stenosis of left middle cerebral artery (HRC) I63.512   Intermittent A. Fib with RVR and the recent stroke lesions with CHADS2 score (high blood pressure, diabetes, and recent stroke, he also has elvi) of 4 - tolerating Zarelto fine w/o bleeding issue. No further focal neuro, TIA or CVA sx's.    MEDICAL DECISION MAKING 8/21/2019 : dr vicenta Morrowy had a stroke 6/14/2019 had a left MCA stroke; this was atheroembolic in mechanism.     He underwent carotid endarterectomy HD #2 and that, along with excelllent in-patient care at [facility] resulted in a very good outcome with minimal deficits.    In addition to left carotid stenosis,, his other stroke risk factors are addressed below.    He has subtle weakness in RUE in elbow flexion mid supinated and wrist extension, likely UMN though maintain vigilance for radial neuroapthy  Information provided to the patient:    6/14/19 CTA head and neck    CONCLUSION:   HEAD CT:  1.  Normal for age.  2.  No CT finding of a mass, infarct or hemorrhage  HEAD CTA:   1.  No branch vessel occlusion, high-grade stenosis, aneurysm, or high flow vascular malformation involving proximal [place] vessels.  NECK CTA:  1.  90% or greater focal stenosis in the proximal left internal carotid artery at its origin based on NASCET criteria. There is reconstitution of normal vascular caliber distal to this, with maintained patency into the head.  2.  No additional significant stenosis in the cervical vessels based on NASCET criteria.  3.  No evidence for dissection.      Medications           Albuterol proventil 2.5mg/3ml neb solution prn      Amitriptyline elavil 50mg    1.5   Atorvastatin lipitor 40mg 1      Carbidopa/levodopa Sinemet 25/100 off      Diphenhydramine benadryl 25mg off      dulaglutide trulicity 1.5mg/0.5ml sundays      Epinephrine epipen       Fluticasone flonase 50mcg/act spray No prn      Glipizide glucotrol XL 5mg 24hr off      Hypromellose-dextran artificial tears prn      Insulin glargine lantus    23 units   Insulin lispro humalog sliding      Ipratropium atrovent 0.06% spray  prn      Lisinopril zestril 20mg        Lithium ER Lithobid 300mg CR 1   2   Loratadine claritin 10mg        Olopatadine pataday 0.2% solution prn      Polyethylene glycol miralax prn      Primidone mysoline 50mg 4   4   RIvaroxaban anticoagulant xarelto 20mg  1      Rizatriptan maxalt 5mg ODT off      Warfarin coumadin 5mg  off                      Plan:    Dopamine scan (datscan) - need insurance approval and needs review of medication that you are taking and if they are interfering with the scan.    pharmacy review - see janusz's note below - as well as psychiatrist Ai Buck  Review of lithium, amitriptyline and proproposed antidopamine/antipsychotic aripiprazole/abilify.     He has been stable from a mood point of view with the lithium - with good renal  function and thyroid function.     Diabetes with good management A1C    Anticoagulated due to intermittent atrial fibrillation - ecg was NSR in April with possible septal infarct vs lead  Had normal QTC  ecg today to monitor medication and review findings.  Consideration for echo, etc. If appropriate with PCP  - he is asymptomatic.     Prior stroke 2019    dbs information from Orlin Hearn MA and  Genesis Lucia RN    Neuropsychological evaluation - to be scheduled - 1 hour interview and cognitive testing.     Need access to recent imaging if done 2022; had imaging done in 2022    Goal right hand improved  With dbs - possibly rechargeable     3T MRI of brain to be done at [facility] for DBS work up for tremor patients:  T1 sequence should be done WITH and WITHOUT contrast; T2 sequences should be done WITHOUT contrast.   Thin cuts, no spaces please. Assess for loss of swallow tail sign.      Brain mri with and without for DBS for tremor workup    Ct angiogram to followup on his carotid endarterectomy 2019    Ongoing sleep apnea issues     Neurosurgical consultation    Videotaping with tremor and updrs rating scales as he has a history of shuffling and reduced left arm swing.     Return       Coding statement:   Medical Decision Making:  #  Chronic progressive medical conditions addressed  - see above --   Review and/or interpretation of unique test or documentation from a provider outside of neurology reviewed   Independent historian provided additional details  yes  Prescription drug management and review of potential side effects and/or monitoring for side effects  -- see above ---  Health impacted by social determinants of health  no    I have reviewed the note as documented above.  This accurately captures the substance of my conversation with the patient and total time spent preparing for visit, executing visit and completing visit on the day of the visit:  90 minutes.  The portion of this total time included face to face  time  11am - 1205a -  Additional prep time today prior to visit, etc.     Junaid Barrett MD     ______________________________________    Last visit date and details:             ______________________________________      Patient was asked about 14 Review of systems including changes in vision (dry eyes, double vision), hearing, heart, lungs, musculoskeletal, depression, anxiety, snoring, RBD, insomnia, urinary frequency, urinary urgency, constipation, swallowing problems, hematological, ID, allergies, skin problems: seborrhea, endocrinological: thyroid, diabetes, cholesterol; balance, weight changes, and other neurological problems and these were not significant at this time except for   Patient Active Problem List   Diagnosis     Anticoagulated     Lower urinary tract symptoms (LUTS)     Hyperlipidemia     Abnormal involuntary movement     Asthma     Bipolar 1 disorder (H)     Bipolar I disorder with rambo (H)     Carpal tunnel syndrome     Acquired trigger finger     Cerebrovascular accident (CVA) due to stenosis of left middle cerebral artery (H)     Cerebrovascular accident (CVA), unspecified mechanism (H)     DJD (degenerative joint disease)     DM type 2 (diabetes mellitus, type 2) (H)     History of atrial fibrillation     Familial tremor     History of hepatitis C     Insomnia     Intermittent atrial fibrillation (H)     Obesity     Migraine headache     SANGITA (obstructive sleep apnea)     Osteoarthrosis involving multiple sites but not designated as generalized     Polysubstance dependence (H)     Recurrent major depressive episodes (H)     S/P lobectomy of lung     Stenosis of left carotid artery     Tubular adenoma of colon          Allergies   Allergen Reactions     Bee Venom Anaphylaxis and Swelling     Chicken-Derived Products (Egg)      Other reaction(s): Gastrointestinal, Other (see comments)  Can eat egg as ingredient, but egg by itself creates bad GI upset  Can eat egg as ingredient, but egg by itself  "creates bad GI upset       Aspirin Other (See Comments) and Nausea     Other reaction(s): GI intolerance  tolerates 81 mg EC tablets  GI upset, tolerates 81 mg EC tablets       Empagliflozin Other (See Comments)     Other reaction(s): Other (see comments)  UTI, Yeast infection  UTI, Yeast infection       Levofloxacin Other (See Comments)     Other reaction(s): Other (see comments)  Swelling of arm tendons  Swelling of arm tendons       Metformin      Other reaction(s): Gastrointestinal, GI intolerance     Norfloxacin Other (See Comments)     Other reaction(s): Other (see comments)  Severe headache  Severe headache       Oxycodone Other (See Comments)     Other reaction(s): Other (see comments)  \"Feels Weird\"  \"Feels Weird\"       Penicillins Rash     blotchy, hives  blotchy, hives       Past Surgical History:   Procedure Laterality Date     colonoscopy  2009     COLONOSCOPY  03/07/2017    F/U 5 yrs.Polyp on the right side benign tubular adenoma 3-4 mm.Bx at 50 cm benign tissue no true polyp. Polyp at 30 cm benign hyperplastic polyp.Mild diverticulosis.     EGD  2017    F/U PRN. Duodenum and stomach normal. Esophagus mild inflammation.Dilated esophagus to 20 mm using balloon.     KNEE SURGERY Left 2009    [facility]     KNEE SURGERY Right      KNEE SURGERY Right 01/21/2020    revision     LUNG SURGERY  10/27/2014    right M lung lobectomy at [facility]     History reviewed. No pertinent past medical history.  Social History     Socioeconomic History     Marital status:      Spouse name: Not on file     Number of children: Not on file     Years of education: Not on file     Highest education level: Not on file   Occupational History     Not on file   Tobacco Use     Smoking status: Former Smoker     Smokeless tobacco: Never Used     Tobacco comment: former smoker 2014   Substance and Sexual Activity     Alcohol use: Not on file     Drug use: Not Currently     Sexual activity: Not on file   Other Topics Concern     Not on " file   Social History Narrative     Not on file     Social Determinants of Health     Financial Resource Strain: Not on file   Food Insecurity: Not on file   Transportation Needs: Not on file   Physical Activity: Not on file   Stress: Not on file   Social Connections: Not on file   Intimate Partner Violence: Not on file   Housing Stability: Not on file       Drug and lactation database from the [place] National Library of Medicine:  http://toxnet.nlm.nih.gov/cgi-bin/sis/htmlgen?LACT      B/P: Data Unavailable, T: Data Unavailable, P: Data Unavailable, R: Data Unavailable 0 lbs 0 oz  There were no vitals taken for this visit., There is no height or weight on file to calculate BMI.  Medications and Vitals not listed above were documented in the cart and reviewed by me.     Current Outpatient Medications   Medication Sig Dispense Refill     insulin glargine (LANTUS SOLOSTAR) 100 UNIT/ML pen Inject 23 Units Subcutaneous daily       insulin lispro (HUMALOG KWIKPEN) 100 UNIT/ML (1 unit dial) KWIKPEN Inject 5 Units subcutaneously three times daily before meals. Plus correction scale, Target 150, Sensitivity 50, TDD= 25 units       albuterol (PROVENTIL) (2.5 MG/3ML) 0.083% neb solution 2.5 mg by Nebulization route every 6 hours as needed for Wheezing.       amitriptyline (ELAVIL) 50 MG tablet TAKE 1 & 1/2 (ONE & ONE-HALF) TABLETS BY MOUTH AT BEDTIME       atorvastatin (LIPITOR) 40 MG tablet Take 1 tablet by mouth daily       carbidopa-levodopa (SINEMET)  MG tablet Take at least one hour before and at least 2 hours after the end of meals. Start with one tablet 3 times daily and increase weekly per instructions up to 2.5 tabs each dose.       diphenhydrAMINE (BENADRYL) 25 MG tablet Take 25 mg by mouth every 6 hours as needed       dulaglutide (TRULICITY) 1.5 MG/0.5ML pen Inject 1.5 mg Subcutaneous once a week       EPINEPHrine (ANY BX GENERIC EQUIV) 0.3 MG/0.3ML injection 2-pack Inject 0.3 mg into the muscle        fluticasone (FLONASE) 50 MCG/ACT nasal spray 2 sprays       glipiZIDE (GLUCOTROL XL) 5 MG 24 hr tablet TAKE 2 TABLETS BY MOUTH ONCE DAILY       hypromellose-dextran (ARTIFICAL TEARS) 0.1-0.3 % ophthalmic solution Place 1-2 Drops into both eyes 4 times daily as needed.       insulin glargine (LANTUS SOLOSTAR) 100 UNIT/ML pen Inject 25 Units Subcutaneous daily       insulin lispro (HUMALOG KWIKPEN) 100 UNIT/ML (1 unit dial) KWIKPEN INJECT 7 UNITS AT BREAKFAST, 5 UNITS AT LUNCH, AND 5 UNITS AT SUPPER, PLUS CORRECTION SCALE TARGET 150, SENSITIVITY 50, TDD= 25 UNITS       ipratropium (ATROVENT) 0.06 % nasal spray Place 2 Sprays into both nostrils 4 times daily as needed for Allergies.       lisinopril (ZESTRIL) 10 MG tablet Take 10 mg by mouth daily       lisinopril (ZESTRIL) 20 MG tablet Take 1 tablet by mouth daily       lisinopril (ZESTRIL) 5 MG tablet Take 5 mg by mouth daily       lithium ER (LITHOBID) 300 MG CR tablet TAKE 1 TABLET BY MOUTH IN THE MORNING AND 2 AT BEDTIME       loratadine (CLARITIN) 10 MG tablet Take 10 mg by mouth daily       olopatadine (PATADAY) 0.2 % ophthalmic solution Place 1 Drop into both eyes daily.       polyethylene glycol (MIRALAX) 17 GM/Dose powder Take 17 g by mouth daily as needed       primidone (MYSOLINE) 50 MG tablet Take 200 mg by mouth daily       rivaroxaban ANTICOAGULANT (XARELTO) 20 MG TABS tablet Take 20 mg by mouth daily       rizatriptan (MAXALT-MLT) 5 MG ODT DISSOLVE 1 TABLET IN MOUTH AS NEEDED FOR HEADACHE. AT ONSET OF MIGRAINE MAY REPEAT IN 2 HOURS IF NEEDED UP TO 6 TABLETS IN 24 HOURS. MAX USE OF 5 DAYS PER MONTH.       warfarin ANTICOAGULANT (COUMADIN) 5 MG tablet            RICHELLE Schumacher MD, M.D., Ph.D. - 03/04/2022 8:00 AM CST  Formatting of this note might be different from the original.  Mr. Reynolds was accompanied by his wife. He came for evaluation of hand tremor. His hand tremor started at least 20 years ago and has gradually been getting  worse. It is more prominent in the right hand. His neurologist, Dr. Lee, in [place] started him on primidone and his current dose is 200 mg twice daily. He recently tried going up to 250 mg doses but this was not tolerated. He has albuterol on his med list and hence has not been prescribed a non-selective beta-blocker.    Other medical problems include atrial fibrillation (on Xarelto); type 2 diabetes mellitus currently well controlled; bipolar disorder for many years on chronic lithium. He has a history of a left carotid endarterectomy done after the development of stroke-like symptoms. That had an excellent outcome. He is had bilateral TKAs surgeries. He also was had a partial lung resection because of an organizing pneumonia with subsequent development of fibrosis. At present, he is doing excellently despite this list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

## 2022-07-08 ENCOUNTER — TELEPHONE (OUTPATIENT)
Dept: NEUROLOGY | Facility: CLINIC | Age: 67
End: 2022-07-08

## 2022-07-08 NOTE — LETTER
July 21, 2022    RE: Truman GARCIA Ktsanes  [address] [facility] [address]    Dear Dr. Zurdo Booker,    We have received your Deep Brain Stimulation referral for Truman. Thank you for providing us with the opportunity to partner with you in the care of Truman. Truman was scheduled to see Dr. Junaid Barrett on 7/7/22. Truman had decided to start Deep Brain Stimulation work-up.    Genesis Lucia, RN Deep Brain Stimulation , will send you the final decision once all testing is completed and the DBS Consensus group has met and discussed Truman. If you have any questions regarding the process, do not hesitate to call my direct desk number listed below.    Best Regards,  Orlin Hearn CMA  DBS Care Coordinator  [facility]  Office phone:  [phone]     [facility]  [facility]  [address] [address]  Clinic Phone: [phone]  Fax: [phone]

## 2022-07-08 NOTE — TELEPHONE ENCOUNTER
Called the patient to get them scheduled for their DBS workup, for Tremor. There was no answer after 10 rings.

## 2022-07-11 ENCOUNTER — TELEPHONE (OUTPATIENT)
Dept: NEUROLOGY | Facility: CLINIC | Age: 67
End: 2022-07-11

## 2022-07-11 NOTE — TELEPHONE ENCOUNTER
Tried to reach the patient again to schedule appointments for Deep Brain Stimulation work up and after 10 rings there was no answer. There was no voicemail to leave a message either. Sent the patient a [employer] message.

## 2022-07-11 NOTE — TELEPHONE ENCOUNTER
MTM referral from: [facility] visit (referral by provider)    MTM referral outreach attempt #2 on July 11, 2022 at 12:32 PM      Outcome: Patient not reachable after several attempts, will route to MTM Pharmacist/Provider as an FYI.  MT scheduling number is [phone].  Thank you for the referral.    Raymond Pablo, MTM coordinator

## 2022-07-21 ENCOUNTER — MYC MEDICAL ADVICE (OUTPATIENT)
Dept: NEUROLOGY | Facility: CLINIC | Age: 67
End: 2022-07-21

## 2022-07-21 NOTE — TELEPHONE ENCOUNTER
Graciela left the writer a message to clarify the patient's Deep Brain Stimulation work up appointments. Called Graciela back and informed the writer was not able to get the patient in for the MRI on 10/17 and that is why the patient is scheduled for the MRI on 10/19. Graciela verbalized understanding and had no further questions.

## 2022-07-21 NOTE — TELEPHONE ENCOUNTER
The patient called back was scheduled for their DBS workup, for Tremor.    The patient was informed of [facility] - work up. The patient stated he would like to sign up for [facility]-Work up. A future message was sent to sign the patient up closer to his work up dates on 10/17,10/18, and 10/19.    A list of the patient's Deep Brain Stimulation work up appointments was sent to them via [employer].

## 2022-07-22 ENCOUNTER — TRANSFERRED RECORDS (OUTPATIENT)
Dept: MULTI SPECIALTY CLINIC | Facility: CLINIC | Age: 67
End: 2022-07-22

## 2022-07-22 LAB — HBA1C MFR BLD: 5.6 %

## 2022-07-23 ENCOUNTER — HEALTH MAINTENANCE LETTER (OUTPATIENT)
Age: 67
End: 2022-07-23

## 2022-08-02 ENCOUNTER — VIRTUAL VISIT (OUTPATIENT)
Dept: PHARMACY | Facility: CLINIC | Age: 67
End: 2022-08-02
Attending: PSYCHIATRY & NEUROLOGY
Payer: COMMERCIAL

## 2022-08-02 DIAGNOSIS — Z79.4 TYPE 2 DIABETES MELLITUS WITHOUT COMPLICATION, WITH LONG-TERM CURRENT USE OF INSULIN (H): ICD-10-CM

## 2022-08-02 DIAGNOSIS — E78.5 HYPERLIPIDEMIA, UNSPECIFIED HYPERLIPIDEMIA TYPE: ICD-10-CM

## 2022-08-02 DIAGNOSIS — G47.00 INSOMNIA, UNSPECIFIED TYPE: ICD-10-CM

## 2022-08-02 DIAGNOSIS — J30.2 SEASONAL ALLERGIC RHINITIS, UNSPECIFIED TRIGGER: ICD-10-CM

## 2022-08-02 DIAGNOSIS — I48.0 INTERMITTENT ATRIAL FIBRILLATION (H): ICD-10-CM

## 2022-08-02 DIAGNOSIS — F31.10 BIPOLAR I DISORDER WITH MANIA (H): ICD-10-CM

## 2022-08-02 DIAGNOSIS — R25.1 TREMOR: Primary | ICD-10-CM

## 2022-08-02 DIAGNOSIS — K59.00 CONSTIPATION, UNSPECIFIED CONSTIPATION TYPE: ICD-10-CM

## 2022-08-02 DIAGNOSIS — I10 HYPERTENSION, UNSPECIFIED TYPE: ICD-10-CM

## 2022-08-02 DIAGNOSIS — E11.9 TYPE 2 DIABETES MELLITUS WITHOUT COMPLICATION, WITH LONG-TERM CURRENT USE OF INSULIN (H): ICD-10-CM

## 2022-08-02 PROCEDURE — 99607 MTMS BY PHARM ADDL 15 MIN: CPT | Performed by: PHARMACIST

## 2022-08-02 PROCEDURE — 99605 MTMS BY PHARM NP 15 MIN: CPT | Performed by: PHARMACIST

## 2022-08-02 NOTE — PROGRESS NOTES
Medication Therapy Management (MTM) Encounter    ASSESSMENT:                            Medication Adherence/Access: No issues identified    Tremor: patient is on a high-dose of primidone with some benefit. Primidone can interfere with efficacy of anticoagulant medications so I am concerned that the patient's Xarelto may not be optimally effective in preventing clots. If he is able to have deep brain stimulation, I would highly recommend weaning off the primidone. He would prefer to stay on primidone for now.     Bipolar disorder: lithium can cause tremor, so this medication may be contributing to his tremor but is unlikely to be the only cause. As an alternative, he could consider trying lamotrigine and this could be discussed with his new psychiatrist in the future after he establishes care with them.     Insomnia: patient is aware of risks of anticholinergic medications like amitriptyline. Since this medication has been helpful for sleep, he may also respond well to low-dose doxepin which is also a tricyclic but in low doses has minimal impact on cognition/balance.     Type 2 Diabetes: stable     Hyperlipidemia: stable     Afib/Hypertension: stable     Allergic Rhinitis: stable     Constipation: stable     PLAN:                            1. We reviewed your current medications and discussed that lithium can cause tremors. In the future, you may want to talk with your new psychiatrist about possibly changing lithium to lamotrigine (Lamictal) which typically doesn't cause tremors.    2. Amitriptyline can cause cognitive side effects as you get older. An alternative to amitriptyline that has a lower risk of this would be doxepin 3-6 mg nightly.     3. We discussed that primidone can interfere with the effectiveness of blood thinners like Xarelto. In the future, it may be worth weaning off primidone to avoid this interaction.     Follow-up: 1 year or sooner if needed     SUBJECTIVE/OBJECTIVE:                           Truman Reynolds is a 66 year old male contacted via secure video for an initial visit. He was referred to me from Dr. Barrett.      Reason for visit: medication review.    Allergies/ADRs: Reviewed in chart  Past Medical History: Reviewed in chart  Tobacco: He reports that he has quit smoking. He has never used smokeless tobacco.  Alcohol: none; history of alcohol dependence     Medication Adherence/Access:   Patient uses pill box(es).  Patient takes medications 4 time(s) per day.   Per patient, misses medication 0 times per week.   Medication barriers: none- getting Xarelto through a copay assistance program   The patient fills medications at [place]: NO, fills medications at [facility] in [place], [place].    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     Tremor: taking primidone 200 mg twice daily (4 tablets of 50 mg each time). The primidone does reduce his tremor but he reports it is still very difficult to eat. Right hand is worse than the left hand and because he is right hand dominant, would like to see improvement in the right if possible. He notes that his tremor seemed to worsen after his stroke about 2 years ago. He did try a higher dose of primidone but didn't like how it made him feel so reduced back down to current dose. He also tried carbidopa-levodopa but reports it made him feel horrible and did not improve the tremors at all. Patient is very interested in deep brain stimulation.     Bipolar disorder: Taking lithium  mg in the morning and 600 mg at bedtime. Has taken this at least 30 years. Initially was on a larger dose and didn't like he felt on it so it was reduced. Reports the medication is working well for his mood. He is aware that lithium can cause tremor. He wondered about switching to Abilify but was told this could exacerbate his tremor even more. He has had 2 psychiatrists pass away and is looking to find a new psychiatrist near him.     5/2/22 creatinine 0.89 (eGFR >60  "mL/min)  4/7/22 lithium 0.6    Insomnia: taking amitriptyline 75 mg nightly. Reports this medication is working well for his sleep. He had issues with seep walking when he took Ambien. Also was on trazodone in the past. States he is aware of cognitive risks of amitriptyline as he gets older.      Type 2 Diabetes:  Currently taking Humalog 5 units 3 times/day, Lantus 21 units nightly, Trulicity on Sundays. Reports he has lost 30 pounds since starting on these medications and has made significant dietary changes. His doses of insulin have been reduced given his excellent diabetes control.   Blood sugar monitoring: average about 103 (not having lows lately)  Aspirin: Not taking due to on Xarelto  Statin: Yes: atorvastatin 40 mg    ACEi/ARB: Yes: lisinopril 20 mg daily    A1c: 5.6% (3/31/22)    Hyperlipidemia: Current therapy includes atorvastatin 40 mg daily.  Patient reports no significant myalgias or other side effects.      Afib/Hypertension: Patient is currently taking Xarelto 20mg daily (dinner) for anticoagulation. Patient reports no current concerns of bruising or bleeding. Patient is also taking lisinopril 20 mg daily. Patient reports no current medication side effects.     BP Readings from Last 3 Encounters:   07/07/22 139/81   07/06/22 (!) 147/63     Allergic Rhinitis: Current medications include ipratropium nasal spray- dries up nose well. Also uses Pataday eye drops as needed when allergies are bad and this works well. Only using albuterol when allergies are bad- didn't use any the last couple years.     Constipation: Takes Miralax at bedtime most days and states he would not be \"regular\" without it.     Today's Vitals: There were no vitals taken for this visit.  ----------------    I spent 26 minutes with this patient today. I offer these suggestions for consideration by Dr. Barrett. A copy of the visit note was provided to the patient's provider(s).    The patient was sent via [employer] a summary of these " recommendations.     Sabra Way, Pharm.D.  Medication Therapy Management Pharmacist  [facility]    Telemedicine Visit Details  Type of service:  Video Conference via [employer]  Start Time: 8:34 AM  End Time: 9:00 AM  Originating Location (patient location): Home  Distant Location (provider location):  [facility]     Medication Therapy Recommendations  No medication therapy recommendations to display

## 2022-08-02 NOTE — PATIENT INSTRUCTIONS
"Recommendations from today's MTM visit:                                                    MTM (medication therapy management) is a service provided by a clinical pharmacist designed to help you get the most of out of your medicines.      1. We reviewed your current medications and discussed that lithium can cause tremors. In the future, you may want to talk with your new psychiatrist about possibly changing lithium to lamotrigine (Lamictal) which typically doesn't cause tremors.    2. Amitriptyline can cause cognitive side effects as you get older. An alternative to amitriptyline that has a lower risk of this would be doxepin 3-6 mg nightly.     3. We discussed that primidone can interfere with the effectiveness of blood thinners like Xarelto. In the future, it may be worth weaning off primidone to avoid this interaction.     Follow-up: as needed    It was great speaking with you today.  I value your experience and would be very thankful for your time in providing feedback in our clinic survey. In the next few days, you may receive an email or text message from [employer] with a link to a survey related to your  clinical pharmacist.\"     To schedule another MTM appointment, please call the clinic directly or you may call the MTM scheduling line at [phone] or toll-free at [phone].     My Clinical Pharmacist's contact information:                                                      Please feel free to contact me with any questions or concerns you have.      Sabra Way, Pharm.D.  Medication Therapy Management Pharmacist  [facility]     "

## 2022-08-04 NOTE — PROGRESS NOTES
Please abstract the following data from this visit with this patient into the appropriate field in Epic:    Other Tests found in the patient's chart through Chart Review/Care Everywhere:    A1c done by this group [facility] on this date: 7/22/22, Microalbumin done by this group [facility] on this date: 12/27/21  and lipid panel done by this group [facility] on this date: 3/31/22

## 2022-08-15 NOTE — TELEPHONE ENCOUNTER
FUTURE VISIT INFORMATION      FUTURE VISIT INFORMATION:    Date: 10/18/2022    Time: 1pm    Location: [facility]  REFERRAL INFORMATION:    Referring provider:  Dr. Barrett     Referring providers clinic:  [facility]     Reason for visit/diagnosis  DBS     RECORDS REQUESTED FROM:       Clinic name Comments Records Status Imaging Status   Internal Dr. Barrett-7/7/2022 Epic No Images          [facility]  MR Brain-12/11/2020 Care Everywhere Requested                        10/4/2022-Request for images faxed to [facility]-MR @ 730am    10/17/2022-[facility] Images now in PACS-MR @ 517am

## 2022-08-19 ENCOUNTER — TELEPHONE (OUTPATIENT)
Dept: NUCLEAR MEDICINE | Facility: CLINIC | Age: 67
End: 2022-08-19

## 2022-09-13 ENCOUNTER — ANCILLARY PROCEDURE (OUTPATIENT)
Dept: NUCLEAR MEDICINE | Facility: CLINIC | Age: 67
End: 2022-09-13
Attending: PSYCHIATRY & NEUROLOGY
Payer: COMMERCIAL

## 2022-09-13 DIAGNOSIS — G20.A1 PARKINSON DISEASE (H): ICD-10-CM

## 2022-09-13 PROCEDURE — A9584 IODINE I-123 IOFLUPANE: HCPCS | Performed by: RADIOLOGY

## 2022-09-13 PROCEDURE — 78803 RP LOCLZJ TUM SPECT 1 AREA: CPT | Performed by: RADIOLOGY

## 2022-09-13 RX ORDER — IOFLUPANE I-123 2 MCI/ML
4-6 INJECTION, SOLUTION INTRAVENOUS ONCE
Status: COMPLETED | OUTPATIENT
Start: 2022-09-13 | End: 2022-09-13

## 2022-09-13 RX ADMIN — IOFLUPANE I-123 5.1 MCI.: 2 INJECTION, SOLUTION INTRAVENOUS at 10:19

## 2022-10-01 ENCOUNTER — HEALTH MAINTENANCE LETTER (OUTPATIENT)
Age: 67
End: 2022-10-01

## 2022-10-06 ENCOUNTER — TELEPHONE (OUTPATIENT)
Dept: NEUROLOGY | Facility: CLINIC | Age: 67
End: 2022-10-06

## 2022-10-06 NOTE — TELEPHONE ENCOUNTER
The patient asked if the writer had sent him hotel information for his upcoming Deep Brain Stimulation work-up appointments.    The patient was informed the writer does not send anything out for hotels but could provide him with phone number to lodging and accommodations and they can help him with this. The patient stated he would like the phone number and the phone number, [phone] (toll-free), was provided to the patient.    The patient had no further questions for the writer and thanked the writer for her help.

## 2022-10-10 ENCOUNTER — DOCUMENTATION ONLY (OUTPATIENT)
Dept: NEUROLOGY | Facility: CLINIC | Age: 67
End: 2022-10-10

## 2022-10-10 ENCOUNTER — TELEPHONE (OUTPATIENT)
Dept: NEUROLOGY | Facility: CLINIC | Age: 67
End: 2022-10-10

## 2022-10-10 DIAGNOSIS — G25.0 TREMOR, ESSENTIAL: ICD-10-CM

## 2022-10-10 DIAGNOSIS — G20.A1 PARKINSON DISEASE (H): ICD-10-CM

## 2022-10-10 DIAGNOSIS — R25.1 TREMOR: Primary | ICD-10-CM

## 2022-10-10 NOTE — TELEPHONE ENCOUNTER
Deep Brain Stimulation Surgery Surgical Candidacy Form    Referring Provider: Zurdo Booker MD   Patient Information: Lives in [place], [place]  Name: Truman Reynolds  YOB: 1955  Age: 66 year old  Height: n/a  Weight: 239 lbs  BMI: n/a  Blood Pressure: 132/75  Diagnosis: Tremor (mixed essential tremor and Parkinson's disease)  Age of Onset of Symptoms: 40s. Year of Onset of Symptoms: 2000's.  Age of Diagnosis: 64. Year of Diagnosis: 2020  Handedness: Right.  Side of Onset: Right upper extremity., Right lower extremity.   Disease Features: Rigidity and Tremor     Surgery Goals: Decrease tremor. and Other: to be able to write, eat, and use a screwdriver     Medications: As of 11/7/22:  Current Outpatient Medications   Medication Sig Dispense Refill     albuterol (PROVENTIL) (2.5 MG/3ML) 0.083% neb solution 2.5 mg by Nebulization route every 6 hours as needed for Wheezing.       amitriptyline (ELAVIL) 50 MG tablet TAKE 1 & 1/2 (ONE & ONE-HALF) TABLETS BY MOUTH AT BEDTIME       atorvastatin (LIPITOR) 40 MG tablet Take 1 tablet by mouth daily       dulaglutide (TRULICITY) 1.5 MG/0.5ML pen Inject 1.5 mg Subcutaneous once a week       EPINEPHrine (ANY BX GENERIC EQUIV) 0.3 MG/0.3ML injection 2-pack Inject 0.3 mg into the muscle as needed       insulin glargine (LANTUS SOLOSTAR) 100 UNIT/ML pen Inject 21 Units Subcutaneous At Bedtime       insulin lispro (HUMALOG KWIKPEN) 100 UNIT/ML (1 unit dial) KWIKPEN Inject 5 Units subcutaneously three times daily before meals. Plus correction scale, Target 150, Sensitivity 50, TDD= 25 units       ipratropium (ATROVENT) 0.06 % nasal spray Place 2 Sprays into both nostrils 4 times daily as needed for Allergies.       lamoTRIgine (LAMICTAL) 100 MG tablet TAKE 1/2 (ONE-HALF) TABLET BY MOUTH ONCE DAILY FOR 14 DAYS THEN 1 TABLET ONCE DAILY FOR 14 DAYS THEN 1 & 1/2 TABLETS ONCE DAILY FOR 14 DAYS THEN 2 TABLETS ONCE  "DAILY FOR 14 DAYS       lisinopril (ZESTRIL) 20 MG tablet Take 1 tablet by mouth daily       olopatadine (PATADAY) 0.2 % ophthalmic solution Place 1 Drop into both eyes daily as needed       polyethylene glycol (MIRALAX) 17 GM/Dose powder Take 17 g by mouth daily as needed       primidone (MYSOLINE) 50 MG tablet Week 1, take 225 mg qam / 200 qpm. Week 2, take 225 mg twice daily. Week 3, take 250 mg qam / 225 mg qpm. Week 4, take 250  mg twice daily. Stop as lowest effective dose. 60 tablet 11     rivaroxaban ANTICOAGULANT (XARELTO) 20 MG TABS tablet Take 20 mg by mouth daily         Motor Assessment:  UPDRS OFF: 37  TETRAS: 23.5     Neuropsychological Evaluation:    Cognitive Issues:  Significant weaknesses in executive functions. Difficulties in visual memory. Weaknesses in visuoconstructional abilities beyond tremor. Overall, the data are in MCI territory. Pt and wife report no functional deficits. Multifactorial origin: parkinsonian condition on top of weaknesses associated with BPAD and left MCA stroke. Maybe some secondary issues from untreated sleep apnea, medication effects.      Psychiatric Issues: Long history with BPAD. Mulitple aborted suicide attempts. Prior psych hosp. Polysubstance abuse with 31 yrs abstinence. Got off lithium recently, describes shift into euthymia and increased energy, everything suddenly being great. Neither he nor wife were concerned about current state being hypomanic. They felt it was more about lifting the fog from lithium. MMPI-3 profile probably invalid for underreporting/trying to \"look good.\" Needs a close eye; he already has a psychiatrist     Depression: He does not endorse current concerns.     Cognitive Function: MCI range    Psychosis: Only experienced such symptoms in context of remote drug use     MRI Date: 10/19/22     Impression:   1. No acute intracranial pathology.  2. 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 This can be  seen with Parkinson disease or " Lewy Body Dementia. Recommend clinical  correlation.  3. Several scattered nonspecific T2 hyperintense foci in cerebral  white matter. There are also T2 hyperintense lesions involving the  cortex and subcortical white matter in the left precentral and  postcentral gyrus. Differentials are broad and include sequela of  prior inflammatory/infectious or vascular insults.    DaTscan: 9/13/22  Impression: A presynaptic dopaminergic deficit is present.     PMH: -  No past medical history on file.  PSH  Past Surgical History:   Procedure Laterality Date     colonoscopy  2009     COLONOSCOPY  03/07/2017    F/U 5 yrs.Polyp on the right side benign tubular adenoma 3-4 mm.Bx at 50 cm benign tissue no true polyp. Polyp at 30 cm benign hyperplastic polyp.Mild diverticulosis.     EGD  2017    F/U PRN. Duodenum and stomach normal. Esophagus mild inflammation.Dilated esophagus to 20 mm using balloon.     ENDARTERECTOMY CAROTID Left 2019    left carotid endarterectomy     KNEE SURGERY Left 2009    [facility]     KNEE SURGERY Right      KNEE SURGERY Right 01/21/2020    revision     LUNG SURGERY  10/27/2014    [facility]     OTHER SURGICAL HISTORY Left     left cadaver tendon used for biceps tendon tear     OTHER SURGICAL HISTORY Bilateral     bilateral carpal tunnel surgery     Soc Hx: former smoker. History of substance abuse (alcohol, heroin) in the past, sober since 36. History of DWI many years ago    Family Hx of Movement Disorders: mother, sister, and 2 brothers with tremor    Consult Tessie Barrett/Shahida/Vivek   Patient discussed at conference on: 11/17/22     DBS Meeting Notes/Further Work-up Needed: -    Authorization to discuss Protected Health Information:  Yes, Graciela (spouse)  Healthcare Directive:  None on file  Mychart use:  Uses reliably  Get Well Loop sign up: Yes.  If yes, was the patient engaged? Yes.    From 11/17/22 Consensus:     1. Candidate - yes  Need to obtain letter of support or additional  information from his treating mental health providers, such as the psychiatrist managing his medications.   2. The plan for surgery- LGpi wait and see, extension  3. The  - rechargeable BSci Genus  4. The motor symptoms we are treating include: slowness, tremor and rigidity   5. Patient goal: Decrease tremor and Other: to be able to write, eat, and use a screwdriver      Do we have all the imaging sequences needed? Yes     Other indication for surgery: Intolerance to levodopa.      Brain MRI findings: mild atrophy, small left MCA stroke      6. GET WELL LOOP?   7. Research?      Authorization to discuss Protected Health Information: Yes, Graciela (spouse)   Healthcare Directive: None on file   Akampus use: Uses reliably        ---------------------------------  Spoke to patient. He will speak with his wife and Psychiatrist. Has appointment next week with psychiatrist.     He asked about whether or not he has Parkinson's disease. Writer discussed the daTscan and what it can tell us and what it can't.      Due to patient's MCI, he is at higher risk of cognitive issues after surgery. He would like to meet with Dr. Pinedo to go over his increased risk due to MCI and cardiac issues. Writer will ask Orlin Hearn to call patient next week to arrange a video appointment. We discussed trying the Arelis Trio and that his testing is good for a year.     Patient did not understand his neuropsychological report and would like clarification from Dr. Doyle. Writer will ask Dr. Doyle to call patient next week in the afternoon between 1-4 pm, Thursday doesn't work.     Writer will ask about research and GET WELL LOOP after he decides what he'd like to do.

## 2022-10-17 ENCOUNTER — OFFICE VISIT (OUTPATIENT)
Dept: NEUROLOGY | Facility: CLINIC | Age: 67
End: 2022-10-17
Payer: COMMERCIAL

## 2022-10-17 VITALS
BODY MASS INDEX: 30.58 KG/M2 | DIASTOLIC BLOOD PRESSURE: 85 MMHG | WEIGHT: 239.8 LBS | SYSTOLIC BLOOD PRESSURE: 134 MMHG | OXYGEN SATURATION: 99 % | HEART RATE: 112 BPM

## 2022-10-17 DIAGNOSIS — G20.A1 PARKINSON DISEASE (H): Primary | ICD-10-CM

## 2022-10-17 DIAGNOSIS — G25.0 ESSENTIAL TREMOR: ICD-10-CM

## 2022-10-17 PROCEDURE — 99417 PROLNG OP E/M EACH 15 MIN: CPT | Performed by: NURSE PRACTITIONER

## 2022-10-17 PROCEDURE — 99215 OFFICE O/P EST HI 40 MIN: CPT | Performed by: NURSE PRACTITIONER

## 2022-10-17 RX ORDER — LAMOTRIGINE 100 MG/1
TABLET ORAL
COMMUNITY
Start: 2022-09-22 | End: 2023-02-27

## 2022-10-17 ASSESSMENT — UNIFIED PARKINSONS DISEASE RATING SCALE (UPDRS)
RIGIDITY_RLE: NORMAL
FINGER_TAPPING_LEFT: MILD: ANY OF THE FOLLOWING: A) 3 TO 5 INTERRUPTIONS DURING TAPPING B) MILD SLOWING C) THE AMPLITUDE DECREMENTS MIDWAY IN THE 10-MOVEMENT SEQUENCE
RIGIDITY_LLE: NORMAL
FINGER_TAPPING_RIGHT: MILD: ANY OF THE FOLLOWING: A) 3 TO 5 INTERRUPTIONS DURING TAPPING B) MILD SLOWING C) THE AMPLITUDE DECREMENTS MIDWAY IN THE 10-MOVEMENT SEQUENCE
SPEECH: NORMAL
POSTURAL_STABILITY: SLIGHT: 3-5 STEPS, BUT RECOVERS UNAIDED.
HANDMOVEMENTS_RIGHT: MILD: ANY OF THE FOLLOWING: A) 3 TO 5 INTERRUPTIONS DURING TAPPING B) MILD SLOWING C) THE AMPLITUDE DECREMENTS MIDWAY IN THE 10-MOVEMENT SEQUENCE
PARKINSONS_MEDS: OFF
PRONATION_SUPINATION_LEFT: MILD: ANY OF THE FOLLOWING: A) 3 TO 5 INTERRUPTIONS DURING TAPPING B) MILD SLOWING C) THE AMPLITUDE DECREMENTS MIDWAY IN THE 10-MOVEMENT SEQUENCE
POSTURE: 0 NORMAL, NO PROBLEMS
DYSKINESIAS_PRESENT: NO
RIGIDITY_RUE: MILD: RIGIDITY DETECTED WITHOUT THE ACTIVATION MANEUVER.  FULL RANGE OF MOTION IS EASILY ACHIEVED.
CONSTANCY_TREMOR_ATREST: MILD: TREMOR AT REST IS PRESENT 25-50% OF THE ENTIRE EXAMINATION PERIOD.
TOETAPPING_LEFT: NORMAL
HANDMOVEMENTS_LEFT: MILD: ANY OF THE FOLLOWING: A) 3 TO 5 INTERRUPTIONS DURING TAPPING B) MILD SLOWING C) THE AMPLITUDE DECREMENTS MIDWAY IN THE 10-MOVEMENT SEQUENCE
LEG_AGILITY_RIGHT: NORMAL
LEG_AGILITY_LEFT: NORMAL
AMPLITUDE_LUE: MILD > 1 CM BUT < 3 CM IN MAXIMAL AMPLITUDE.
TOETAPPING_RIGHT: NORMAL
AXIAL_SCORE: 9
RIGIDITY_NECK: MILD: RIGIDITY DETECTED WITHOUT THE ACTIVATION MANEUVER.  FULL RANGE OF MOTION IS EASILY ACHIEVED.
FACIAL_EXPRESSION: MODERATE: MASKED FACIES WITH LIPS PARTED SOME OF THE TIME WHEN THE MOUTH IS AT REST.
FREEZING_GAIT: NORMAL
TOTAL_SCORE: 14
SPONTANEITY_OF_MOVEMENT: 1: SLIGHT: SLIGHT GLOBAL SLOWNESS AND POVERTY OF SPONTANEOUS MOVEMENTS.
TOTAL_SCORE_LEFT: 12
RIGIDITY_LUE: NORMAL
AMPLITUDE_LLE: NORMAL: NO TREMOR.
AMPLITUDE_RLE: NORMAL: NO TREMOR.
AMPLITUDE_RUE: MILD > 1 CM BUT < 3 CM IN MAXIMAL AMPLITUDE.
ARISING_CHAIR: SLIGHT: ARISING IS SLOWER THAN NORMAL, OR MAY NEED MORE THAN ONE ATTEMPT, OR MAY NEED TO MOVE FORWARD IN THE CHAIR TO ARISE.  NO NEED TO USE THE ARMS OF THE CHAIR.
GAIT: SLIGHT: INDEPENDENT WALKING WITH MINOR GAIT IMPAIRMENT.
TOTAL_SCORE: 37
AMPLITUDE_LIP_JAW: NORMAL: NO TREMOR.
PRONATION_SUPINATION_RIGHT: MILD: ANY OF THE FOLLOWING: A) 3 TO 5 INTERRUPTIONS DURING TAPPING B) MILD SLOWING C) THE AMPLITUDE DECREMENTS MIDWAY IN THE 10-MOVEMENT SEQUENCE

## 2022-10-17 ASSESSMENT — ACTIVITIES OF DAILY LIVING (ADL)
HYGIENE: (1) SLIGHTLY ABNORMAL. TREMOR IS PRESENT BUT DOES NOT INTERFERE WITH HYGIENE.
SPEAKING: (0) NORMAL
WORKING: (3) MODERATELY ABNORMAL. UNABLE TO CONTINUE WORKING WITHOUT USING STRATEGIES SUCH AS CHANGING JOBS OR USING SPECIAL EQUIPMENT.
WRITING: (3) MODERATELY ABNORMAL. CANNOT WRITE WITHOUT USING STRATEGIES SUCH AS HOLDING THE WRITING HAND WITH THE OTHER HAND, HOLDING PEN DIFFERENTLY OR USING LARGE PEN.
OVERALL_DISABILITY_WITH_THE_MOST_AFFECTED_TASK: (3) MODERATELY ABNORMAL. CAN DO TASK BUT MUST USE STRATEGIES.
FEEDING_WITH_A_SPOON: (3) MODERATELY ABNORMAL. SPILLS A LOT OR CHANGES STRATEGY TO COMPLETE TASK SUCH AS USING TWO HANDS OR LEANING OVER.
SOCIAL_IMPACT: (0) NORMAL
USING_KEYS: (1) SLIGHTLY ABNORMAL. TREMOR IS PRESENT BUT CAN INSERT KEY WITH ONE HAND WITHOUT DIFFICULTY.
CARRYING_FOOD_TRAYS_PLATES_OR_SIMILAR_ITEMS: (3) MODERATELY ABNORMAL. USES STRATEGIES SUCH AS HOLDING TIGHTLY AGAINST BODY TO CARRY.
POURING: (3) MODERATELY ABNORMAL. MUST USE TWO HANDS OR USES OTHER STRATEGIES TO AVOID SPILLING.
TOTAL_SCORE: 26
DRESS: (3) MODERATELY ABNORMAL. UNABLE TO DO MOST DRESSING UNLESS USES STRATEGY SUCH AS USING VELCRO, BUTTONING SHIRT BEFORE PUTTING IT ON OR AVOIDING SHOES WITH LACES.
DRINKING_FROM_A_GLASS: (3) MODERATELY ABNORMAL. SPILLS A LOT OR CHANGES STRATEGY TO COMPLETE TASK SUCH AS USING TWO HANDS OR LEANING OVER.

## 2022-10-17 ASSESSMENT — PAIN SCALES - GENERAL: PAINLEVEL: NO PAIN (0)

## 2022-10-17 NOTE — PROGRESS NOTES
ASSESSMENT/PLAN:    Tremor: Mr. Reynolds is a 66 year old right - handed adult with a longstanding history of bilateral hand tremor that started in his 40s.  His tremors had been stable up until 2 years ago.  At his initial tremor evaluation it was noted that he also had parkinsonian tremor.  He had seen two neurologist before coming to [facility]. The working diagnosis is ET and PD. He has abnormal DaTscan.  Today's exam also showed parkinsonian symptoms including rest tremor, rigidity, and bradykinesia right body worse than left.  He also has slightly reduced arm swing. Patient did not tolerate carbidopa/levodopa after taking 750 mg/day.    Patient is interested to treat the essential tremor that is interfering with his writing, eating, and using a screwdriver.     __ I discussed the difference between the 2 diagnosis and answered all his questions.    __ His dopamine scan was reviewed.    __ Although his tremors have significantly improved after he was taken off Lithium he still wants to pursue DBS surgery to treat the right hand tremor.    __ I discussed setting a reasonable expectation as DBS or any medical treatment will not eliminate his tremors.    __  Pt had adequate knowledge about DBS.  We discussed the highlights of DBS procedure, risks & benefits; the possibility of 1 - 3 % serious side effects including infection, bleeding, stroke, cognitive & speech impairment, seizures, . . . . & death; the possibility of lead misplacement; appropriate DBS candidates; and DBS programming & the need to return to clinic for reprogramming.  All questions were answered.    __Since he will complete all his DBS work-up by Wednesday this week, most likely he'll be discussed at the DBS Consensus meeting this Thursday. We will let him know the decision.        MOVEMENT DISORDERS CLINIC           PATIENT: Truman Reynolds    : 1955    HAKAN: 2022    REASON FOR VISIT: For evaluation of tremor using a rating  scale.     HPI:  Mr. Truman Reynolds is a 66 year old right - handed  male who came to the [facility] neurology clinic accompanied by his wife, Felicia, for tremor evaluation as part of his Deep Brain Stimulation (DBS) surgery work-up for management of Tremor.    Records Reviewed:  Dr. Lee's note  Dr. Freeman note  Dr. Barrett note  DATscan Image and report    Bilateral hand tremors started in his early 40's.  He has a family history of tremors in his mother.  He had no treatment until 12/2020 at which time he saw Dr. Lee, a neurologist at [facility].  His tremors were manageable until 2 years ago.  Dr. Lee thought that he had mix action and rest tremor suggesting essential tremor (ET) & Parkinson's disease (PD).  During his initial evaluation he was started on primidone and titrated to 200 mg twice daily.    He was seen by the [facility] in March 2022 for a second opinion of his tremors by Dr. Freeman who also thought that he might have ET and PD. He also suggested switching his lithium to another mood stabilizing agent. Pt was started on Carbidopa/Levodopa 25/100 mg and after titrating to 2.5 tabs 3 times daily it was stopped due to worsening tremors, dizziness, and racing heart.     Patient was seen by his psychiatrist, Dr. Sanchez who took him off lithium and started him on lamotrigine around September. Pt has noticed significant improvement in his hand tremors.  However, he still would like to pursue deep brain stimulation (DBS) surgery for tremor improvement.  Bilateral hand tremors interfere with writing, eating, and using screwdriver.     He also would like to find out if he has PD and what the dopamine scan showed. He had a DATscan on 9/13/2022, which was abnormal.    Goal for DBS:  To improve tremors to be able to write, eat and use the screwdriver.      He is interested to have the right hand tremor first.    Battery Type Preference:  Rechargeable due to longevity.     Knowledge about DBS:  He has  "seen the DBS videos. He knows about the phase I & II surgeries. He stated brain infection is one of the side effects that he is the most concerned about.     He all the things he would like to do mechanical work.     MEDICATIONS for TREMOR   8 am 6 pm   Primidone 50 mg  4 4   Lamotrigine 100 mg 1        Vital Signs:  Blood pressure 134/85, pulse 112, weight 108.8 kg (239 lb 12.8 oz), SpO2 99 %., Body mass index is 30.58 kg/m .    Videotaping Consent Obtained:  He checked the box that stated \"I give my consent for clinical, educational, and research purposes.\"  The consent was given to staff to be mailed to [employer] [facility].    Exam was videotaped and uploaded to the Movement Disorders Box File.     TRG Essential Tremor Rating Assessment Scale (TETRAS) V 3.1    Activities of Daily Living Subscale: (Maximum Score = 48)  (0=Normal 1=Slightly abnormal 2=Mildly 3=Moderately 4= Severely)    ADL Sub-Scale 10/17/2022   1. Speaking 0   2. Feeding with a spoon 3   3. Drinking from a glass 3   4. Hygiene 1   5. Dressing 3   6. Pouring 3   7. Carrying food trays, plates or similar items 3   8. Using keys 1   9. Writing 3   10. Working 3   11. Overall disability with the most affected task 3   Name of most affected task: Writing & eating   12. Social impact 0   ADL TOTAL 26     MDS  UPDRS Part II    -- Over the last week -- 0: Normal -- 1: Slight -- 2: Mild -- 3: Moderate -- 4: Severe   UPDRS Questionnaire 10/15/2022   Over the past week, have you had problems with your speech? 0   Over the past week, have you usually had too much saliva during when you are awake or when you sleep? 0   Over the past week, have you usually had problems swallowing pills or eating meals?  Do you need your pills cut or crushed or your meals to be made soft, chopped or blended to avoid choking? 0   Over the past week, have you usually had troubles handling your food and using eating utensils?  For example, " do you have trouble handling finger foods or using forks, knifes, spoons, chopsticks? 1   Over the past week, have you usually had problems dressing?  For example, are you slow or do you need help with buttoning, using zippers, putting on or taking off your clothes or jewelry? 0   Over the past week, have you usually been slow or do you need help with washing, bathing, shaving, brushing teeth, combing your hair or with other personal hygiene? 0   Over the past week, have people usually had trouble reading your handwriting? 3   Over the past week, have you usually had trouble doing your hobbies or other things that you like to do? 4   Over the past week, do you usually have trouble turning over in bed? 0   Over the past week, have you usually had shaking or tremor? 2   Over the past week, have you usually had trouble getting out of bed, a car seat, or a deep chair? 0   Over the past week, have you usually had problems with balance and walking? 0   Over the past week, on your usual day when walking, do you suddenly stop or freeze as if your feet are stuck to the floor. 0   MDS-UPDRS II Total Score 10     Performance Subscale (Maximum score = 64)  (0=No tremor 1=Slight 2=Mild 3=Moderate 4= Severe)    Motor (Performance) Sub-Scale 10/17/2022   Assessment Time 10:24 AM   Medication On   DBS - Right Brain None   DBS - Left Brain None   Head 0   Face & Jaw 0   Voice 1   Outstretched - RIGHT 2.5   Outstretched - LEFT 2   Wingbeating - RIGHT 2.5   Wingbeating - LEFT 2   Kinetic - RIGHT 2   Kinetic - LEFT 2   Lower Limb - RIGHT 0   Lower Limb - LEFT 0   Lower Limb (Max) 0   Spiral - RIGHT 2.5   Spiral - LEFT 1.5   Handwriting 2   Dot approx - RIGHT 2   Dot approx - LEFT 1.5   Trunk (Standing) 0   Total Right 11.5   Total Left 9   Axial 1   TOTAL 23.5     MDS UPDRS Part III Motor Exam  UPDRS Values 10/17/2022   Time: 10:37 AM   Medication Off   R Brain DBS: None   L Brain DBS: None   Dyskinesia (LID) No   Speech 0   Facial  Expression 3   Rigidity Neck 2   Rigidity RUE 2   Rigidity LUE 0   Rigidity RLE 0   Rigidity LLE 0   Finger Taps R 2   Finger Taps L 2   Hand Mvt R 2   Hand Mvt L 2   Pron-/Supinate R 2   Pron-/Supinate L 2   Toe Tap R 0   Toe Tap L 0   Leg Agility R 0   Leg Agility L 0   Arise From Chair 1   Gait 1   Gait Freezing 0   Postural Stability 1   Posture 0   Global Spont Mvt 1   Postural Tremor RUE 2   Postural Tremor LUE 2   Kinetic Tremor RUE 2   Kinetic Tremor LUE 2   Rest Tremor RUE 2   Rest Tremor LUE 2   Rest Tremor RLE 0   Rest Tremor LLE 0   Rest Tremor Lip/Jaw 0   Rest Tremor Constancy 2   Total Right 14   Total Left 12   Axial Total 9   Total 37       Today I spent 123 minutes caring for the patient. Time was spent with reviewing records, meeting with the patient, answering questions, completing tremor assessment & motor assessment and documentation.    AMOS Wilson, CNP  [facility]

## 2022-10-17 NOTE — PATIENT INSTRUCTIONS
Dear Mr. Truman GARCIA Maxijovi,    Thank you for coming today.  During your visit, we have discussed the following:     __Similar to what you have been told by the 2 neurologist that you had seen, most likely you have Parkinson's disease and essential tremor.  Your dopamine scan confirms that you have parkinsonian condition.  __  You have completed the Tremor Rating Scale for her essential tremor and Parkinson's disease.   __  Continue with your DBS workup appointments.  __Since he will complete her work-up by Wednesday this week, most likely we'll be discussed at the DBS Consensus meeting this Thursday. We will let you know the decision.      For questions, you may send us a Ensequence message or call [phone]    Fax number: [phone]    AMOS Wilson, CNP  [facility]

## 2022-10-17 NOTE — LETTER
10/17/2022       RE: Truman Reynolds  [address]  [facility] [address]     Dear Colleague,    Thank you for referring your patient, Truman Reynolds, to the [facility] NEUROLOGY CLINIC [place] at [facility]. Please see a copy of my visit note below.      ASSESSMENT/PLAN:    Tremor: Mr. Reynolds is a 66 year old right - handed adult with a longstanding history of bilateral hand tremor that started in his 40s.  His tremors had been stable up until 2 years ago.  At his initial tremor evaluation it was noted that he also had parkinsonian tremor.  He had seen two neurologist before coming to [facility]. The working diagnosis is ET and PD. He has abnormal DaTscan.  Today's exam also showed parkinsonian symptoms including rest tremor, rigidity, and bradykinesia right body worse than left.  He also has slightly reduced arm swing. Patient did not tolerate carbidopa/levodopa after taking 750 mg/day.    Patient is interested to treat the essential tremor that is interfering with his writing, eating, and using a screwdriver.     __ I discussed the difference between the 2 diagnosis and answered all his questions.    __ His dopamine scan was reviewed.    __ Although his tremors have significantly improved after he was taken off Lithium he still wants to pursue DBS surgery to treat the right hand tremor.    __ I discussed setting a reasonable expectation as DBS or any medical treatment will not eliminate his tremors.    __  Pt had adequate knowledge about DBS.  We discussed the highlights of DBS procedure, risks & benefits; the possibility of 1 - 3 % serious side effects including infection, bleeding, stroke, cognitive & speech impairment, seizures, . . . . & death; the possibility of lead misplacement; appropriate DBS candidates; and DBS programming & the need to return to clinic for reprogramming.  All questions were answered.    __Since he will complete all his DBS  work-up by Wednesday this week, most likely he'll be discussed at the DBS Consensus meeting this Thursday. We will let him know the decision.        MOVEMENT DISORDERS CLINIC           PATIENT: Truman Reynolds    : 1955    HAKAN: 2022    REASON FOR VISIT: For evaluation of tremor using a rating scale.     HPI:  Mr. Truman Reynolds is a 66 year old right - handed  male who came to the [facility] neurology clinic accompanied by his wife, Felicia, for tremor evaluation as part of his Deep Brain Stimulation (DBS) surgery work-up for management of Tremor.    Records Reviewed:  Dr. Lee's note  Dr. Freeman note  Dr. Barrett note  DATscan Image and report    Bilateral hand tremors started in his early 40's.  He has a family history of tremors in his mother.  He had no treatment until 2020 at which time he saw Dr. Lee, a neurologist at [facility].  His tremors were manageable until 2 years ago.  Dr. Lee thought that he had mix action and rest tremor suggesting essential tremor (ET) & Parkinson's disease (PD).  During his initial evaluation he was started on primidone and titrated to 200 mg twice daily.    He was seen by the [facility] in 2022 for a second opinion of his tremors by Dr. Freeman who also thought that he might have ET and PD. He also suggested switching his lithium to another mood stabilizing agent. Pt was started on Carbidopa/Levodopa 25/100 mg and after titrating to 2.5 tabs 3 times daily it was stopped due to worsening tremors, dizziness, and racing heart.     Patient was seen by his psychiatrist, Dr. Sanchez who took him off lithium and started him on lamotrigine around September. Pt has noticed significant improvement in his hand tremors.  However, he still would like to pursue deep brain stimulation (DBS) surgery for tremor improvement.  Bilateral hand tremors interfere with writing, eating, and using screwdriver.     He also would like to find out if he has PD and what  "the dopamine scan showed. He had a DATscan on 9/13/2022, which was abnormal.    Goal for DBS:  To improve tremors to be able to write, eat and use the screwdriver.      He is interested to have the right hand tremor first.    Battery Type Preference:  Rechargeable due to longevity.     Knowledge about DBS:  He has seen the DBS videos. He knows about the phase I & II surgeries. He stated brain infection is one of the side effects that he is the most concerned about.     He all the things he would like to do mechanical work.     MEDICATIONS for TREMOR   8 am 6 pm   Primidone 50 mg  4 4   Lamotrigine 100 mg 1        Vital Signs:  Blood pressure 134/85, pulse 112, weight 108.8 kg (239 lb 12.8 oz), SpO2 99 %., Body mass index is 30.58 kg/m .    Videotaping Consent Obtained:  He checked the box that stated \"I give my consent for clinical, educational, and research purposes.\"  The consent was given to staff to be mailed to [employer], [address] [place] [address].    Exam was videotaped and uploaded to the Movement Disorders Box File.     TRG Essential Tremor Rating Assessment Scale (TETRAS) V 3.1    Activities of Daily Living Subscale: (Maximum Score = 48)  (0=Normal 1=Slightly abnormal 2=Mildly 3=Moderately 4= Severely)    ADL Sub-Scale 10/17/2022   1. Speaking 0   2. Feeding with a spoon 3   3. Drinking from a glass 3   4. Hygiene 1   5. Dressing 3   6. Pouring 3   7. Carrying food trays, plates or similar items 3   8. Using keys 1   9. Writing 3   10. Working 3   11. Overall disability with the most affected task 3   Name of most affected task: Writing & eating   12. Social impact 0   ADL TOTAL 26     MDS  UPDRS Part II    -- Over the last week -- 0: Normal -- 1: Slight -- 2: Mild -- 3: Moderate -- 4: Severe   UPDRS Questionnaire 10/15/2022   Over the past week, have you had problems with your speech? 0   Over the past week, have you usually had too much saliva during when you are awake or when you " sleep? 0   Over the past week, have you usually had problems swallowing pills or eating meals?  Do you need your pills cut or crushed or your meals to be made soft, chopped or blended to avoid choking? 0   Over the past week, have you usually had troubles handling your food and using eating utensils?  For example, do you have trouble handling finger foods or using forks, knifes, spoons, chopsticks? 1   Over the past week, have you usually had problems dressing?  For example, are you slow or do you need help with buttoning, using zippers, putting on or taking off your clothes or jewelry? 0   Over the past week, have you usually been slow or do you need help with washing, bathing, shaving, brushing teeth, combing your hair or with other personal hygiene? 0   Over the past week, have people usually had trouble reading your handwriting? 3   Over the past week, have you usually had trouble doing your hobbies or other things that you like to do? 4   Over the past week, do you usually have trouble turning over in bed? 0   Over the past week, have you usually had shaking or tremor? 2   Over the past week, have you usually had trouble getting out of bed, a car seat, or a deep chair? 0   Over the past week, have you usually had problems with balance and walking? 0   Over the past week, on your usual day when walking, do you suddenly stop or freeze as if your feet are stuck to the floor. 0   MDS-UPDRS II Total Score 10     Performance Subscale (Maximum score = 64)  (0=No tremor 1=Slight 2=Mild 3=Moderate 4= Severe)    Motor (Performance) Sub-Scale 10/17/2022   Assessment Time 10:24 AM   Medication On   DBS - Right Brain None   DBS - Left Brain None   Head 0   Face & Jaw 0   Voice 1   Outstretched - RIGHT 2.5   Outstretched - LEFT 2   Wingbeating - RIGHT 2.5   Wingbeating - LEFT 2   Kinetic - RIGHT 2   Kinetic - LEFT 2   Lower Limb - RIGHT 0   Lower Limb - LEFT 0   Lower Limb (Max) 0   Spiral - RIGHT 2.5   Spiral - LEFT 1.5    Handwriting 2   Dot approx - RIGHT 2   Dot approx - LEFT 1.5   Trunk (Standing) 0   Total Right 11.5   Total Left 9   Axial 1   TOTAL 23.5     MDS UPDRS Part III Motor Exam  UPDRS Values 10/17/2022   Time: 10:37 AM   Medication Off   R Brain DBS: None   L Brain DBS: None   Dyskinesia (LID) No   Speech 0   Facial Expression 3   Rigidity Neck 2   Rigidity RUE 2   Rigidity LUE 0   Rigidity RLE 0   Rigidity LLE 0   Finger Taps R 2   Finger Taps L 2   Hand Mvt R 2   Hand Mvt L 2   Pron-/Supinate R 2   Pron-/Supinate L 2   Toe Tap R 0   Toe Tap L 0   Leg Agility R 0   Leg Agility L 0   Arise From Chair 1   Gait 1   Gait Freezing 0   Postural Stability 1   Posture 0   Global Spont Mvt 1   Postural Tremor RUE 2   Postural Tremor LUE 2   Kinetic Tremor RUE 2   Kinetic Tremor LUE 2   Rest Tremor RUE 2   Rest Tremor LUE 2   Rest Tremor RLE 0   Rest Tremor LLE 0   Rest Tremor Lip/Jaw 0   Rest Tremor Constancy 2   Total Right 14   Total Left 12   Axial Total 9   Total 37       Today I spent 123 minutes caring for the patient. Time was spent with reviewing records, meeting with the patient, answering questions, completing tremor assessment & motor assessment and documentation.          Again, thank you for allowing me to participate in the care of your patient.      Sincerely,    AMOS Miller CNP

## 2022-10-18 ENCOUNTER — PRE VISIT (OUTPATIENT)
Dept: NEUROSURGERY | Facility: CLINIC | Age: 67
End: 2022-10-18

## 2022-10-18 ENCOUNTER — OFFICE VISIT (OUTPATIENT)
Dept: NEUROSURGERY | Facility: CLINIC | Age: 67
End: 2022-10-18
Payer: COMMERCIAL

## 2022-10-18 ENCOUNTER — OFFICE VISIT (OUTPATIENT)
Dept: NEUROPSYCHOLOGY | Facility: CLINIC | Age: 67
End: 2022-10-18
Payer: COMMERCIAL

## 2022-10-18 VITALS
HEART RATE: 111 BPM | SYSTOLIC BLOOD PRESSURE: 132 MMHG | BODY MASS INDEX: 30.48 KG/M2 | DIASTOLIC BLOOD PRESSURE: 75 MMHG | RESPIRATION RATE: 16 BRPM | OXYGEN SATURATION: 96 % | WEIGHT: 239 LBS

## 2022-10-18 DIAGNOSIS — G31.84 MCI (MILD COGNITIVE IMPAIRMENT): Primary | ICD-10-CM

## 2022-10-18 DIAGNOSIS — F41.9 ANXIETY: ICD-10-CM

## 2022-10-18 DIAGNOSIS — G25.0 ESSENTIAL TREMOR: Primary | ICD-10-CM

## 2022-10-18 PROCEDURE — 96132 NRPSYC TST EVAL PHYS/QHP 1ST: CPT | Performed by: CLINICAL NEUROPSYCHOLOGIST

## 2022-10-18 PROCEDURE — 96138 PSYCL/NRPSYC TECH 1ST: CPT | Performed by: CLINICAL NEUROPSYCHOLOGIST

## 2022-10-18 PROCEDURE — 96139 PSYCL/NRPSYC TST TECH EA: CPT | Performed by: CLINICAL NEUROPSYCHOLOGIST

## 2022-10-18 PROCEDURE — 96133 NRPSYC TST EVAL PHYS/QHP EA: CPT | Performed by: CLINICAL NEUROPSYCHOLOGIST

## 2022-10-18 PROCEDURE — 90791 PSYCH DIAGNOSTIC EVALUATION: CPT | Performed by: CLINICAL NEUROPSYCHOLOGIST

## 2022-10-18 PROCEDURE — 99204 OFFICE O/P NEW MOD 45 MIN: CPT | Performed by: NEUROLOGICAL SURGERY

## 2022-10-18 ASSESSMENT — PAIN SCALES - GENERAL: PAINLEVEL: NO PAIN (0)

## 2022-10-18 NOTE — LETTER
10/18/2022       RE: Truman Reynolds  [address]  [facility] [address]     Dear Colleague,    Thank you for referring your patient, Truman Reynolds, to the [facility] NEUROSURGERY CLINIC [place] at [facility]. Please see a copy of my visit note below.    Neurosurgery Attending DBS Consult    Chief Complaint   Patient presents with     Consult       HISTORY OF PRESENT ILLNESS  Mr. Reynolds is a 67 y/o right handed gentleman with a h/o bilateral hand tremor that started in his 40s. It is unclear whether his tremor is due to PD or ET. He has recently had a Michael scan which showed reduced uptake in his left putamen. He has recently been evaluated by Felicity Welch who noted asymmetric parkinsonian symptoms including rest tremor, rigidity, badykinesia with slightly reduced arm swing, R>L throughout. His tremors had been stable up until 2 years ago when they started to worsen. He was on lithium for a long time due to longstanding bipolar disorder and was switched off of this to lamotrigine. His tremor really has significantly improved since coming off the lithium. He does not take dopaminergic medications as he could not tolerate carbidopa/levodopa after taking 750 mg/day.     ON/OFF testin OFF, no ON testing due to med intolerance    MRI: Mild cortical atrophy    Neuropsych testing: tomorrow    DBS goals: to improve his right hand tremor for writing, using tools      No past medical history on file.    Past Surgical History:   Procedure Laterality Date     colonoscopy       COLONOSCOPY  2017    F/U 5 yrs.Polyp on the right side benign tubular adenoma 3-4 mm.Bx at 50 cm benign tissue no true polyp. Polyp at 30 cm benign hyperplastic polyp.Mild diverticulosis.     EGD      F/U PRN. Duodenum and stomach normal. Esophagus mild inflammation.Dilated esophagus to 20 mm using balloon.     ENDARTERECTOMY CAROTID Left 2019    left carotid endarterectomy      KNEE SURGERY Left 2009    [facility]     KNEE SURGERY Right      KNEE SURGERY Right 01/21/2020    revision     LUNG SURGERY  10/27/2014    [facility]     OTHER SURGICAL HISTORY Left     left cadaver tendon used for biceps tendon tear     OTHER SURGICAL HISTORY Bilateral     bilateral carpal tunnel surgery       Family History   Problem Relation Age of Onset     Bronchitis Mother      Other - See Comments Mother         blood clot - emboli to brain     Cerebrovascular Disease Mother      Tremor Mother      Lung Cancer Father      Brain Cancer Sister      Tremor Sister      Other - See Comments Sister         [place] [place] [facility] [place] [place]     Hypertension Brother      Other - See Comments Brother         [place]     Tremor Brother      Arthritis Brother      Other - See Comments Brother         [place]     Tremor Brother      Other - See Comments Brother         [facility]     Other - See Comments Other         step daughter in [place]     Other - See Comments Grandson         great grand daughter     Other - See Comments Granddaughter         great grand son       Social History     Socioeconomic History     Marital status:      Spouse name: Not on file     Number of children: Not on file     Years of education: Not on file     Highest education level: Not on file   Occupational History     Not on file   Tobacco Use     Smoking status: Former     Smokeless tobacco: Never     Tobacco comments:     former smoker 2014   Substance and Sexual Activity     Alcohol use: Not on file     Drug use: Not Currently     Sexual activity: Not on file   Other Topics Concern     Not on file   Social History Narrative     Not on file     Social Determinants of Health     Financial Resource Strain: Not on file   Food Insecurity: Not on file   Transportation Needs: Not on file   Physical Activity: Not on file   Stress: Not on file   Social Connections: Not on file   Intimate Partner Violence: Not on file  "  Housing Stability: Not on file          Allergies   Allergen Reactions     Bee Venom Anaphylaxis, Swelling and Hives     Chicken-Derived Products (Egg)      Other reaction(s): Gastrointestinal, Other (see comments)  Can eat egg as ingredient, but egg by itself creates bad GI upset  Can eat egg as ingredient, but egg by itself creates bad GI upset       Albumin, Egg Nausea     Aspirin Other (See Comments) and Nausea     Other reaction(s): GI intolerance  tolerates 81 mg EC tablets  GI upset, tolerates 81 mg EC tablets       Empagliflozin Other (See Comments)     Other reaction(s): Other (see comments)  UTI, Yeast infection  UTI, Yeast infection       Levofloxacin Other (See Comments)     Other reaction(s): Other (see comments)  Swelling of arm tendons  Swelling of arm tendons       Metformin      Other reaction(s): Gastrointestinal, GI intolerance     Norfloxacin Other (See Comments)     Other reaction(s): Other (see comments)  Severe headache  Severe headache       Oxycodone Other (See Comments)     Other reaction(s): Other (see comments)  \"Feels Weird\"  \"Feels Weird\"       Penicillins Rash     blotchy, hives  blotchy, hives         Current Outpatient Medications   Medication     albuterol (PROVENTIL) (2.5 MG/3ML) 0.083% neb solution     amitriptyline (ELAVIL) 50 MG tablet     atorvastatin (LIPITOR) 40 MG tablet     dulaglutide (TRULICITY) 1.5 MG/0.5ML pen     EPINEPHrine (ANY BX GENERIC EQUIV) 0.3 MG/0.3ML injection 2-pack     insulin glargine (LANTUS SOLOSTAR) 100 UNIT/ML pen     insulin lispro (HUMALOG KWIKPEN) 100 UNIT/ML (1 unit dial) KWIKPEN     ipratropium (ATROVENT) 0.06 % nasal spray     lamoTRIgine (LAMICTAL) 100 MG tablet     lisinopril (ZESTRIL) 20 MG tablet     olopatadine (PATADAY) 0.2 % ophthalmic solution     polyethylene glycol (MIRALAX) 17 GM/Dose powder     primidone (MYSOLINE) 50 MG tablet     rivaroxaban ANTICOAGULANT (XARELTO) 20 MG TABS tablet     No current facility-administered medications " for this visit.         PHYSICAL EXAM  /75   Pulse 111   Resp 16   Wt 108.4 kg (239 lb)   SpO2 96%   BMI 30.48 kg/m      Neurological  Awake, alert and oriented to date, time, place and person. Speech fluent.   Pupils equal, round, reactive to light.  Extraocular movement intact.  Hearing is grossly normal to finger rub.   Facial sensation intact.  Face symmetric.  Tongue midline.  Uvula elevates equally.    Motor: full strength throughout.  Sensation: intact to light touch and pinpoint.        ASSESSMENT   66 year old right handed gentleman with a history of Parkinson's Disease.    During today's visit, we discussed both phase I and II of DBS surgery. We discussed that Phase I would place the DBS electrode on the left side under MAC and microelectrode recording. He would then return one week later for phase II with placement of the DBS generator/battery over the left chest wall under general anesthesia.     Briefly, the following topics were discussed regarding device options:    Ancera  MRI compatible.  Non-rechargeable and rechargeable generator/battery available.  8 contact segmented/directional electrode.  Communication method: Wireless/bluetooth.    Abbott  MRI compatible.  Non-rechargeable generator/battery.  8 contact segmented/directional electrode.  Communication method: Wireless/bluetooth.    Breather  MRI compatible.    Non-rechargeable and rechargeable generator/battery available.  8 contact segmented/directional electrode.  Independent current control at each contacts.  Communication method: Wireless.    I did discuss that the implant technique for these devices are relatively the same which was discussed again.  They all have similar risks associated with the surgery.    The risks, benefits, alternative therapies were discussed with the patient, including but not limited to infection and bleeding. Surgical procedure was discussed in detail. All questions were answered, and he   expressed understanding. His  case will be discussed at the Movement Disorder Consensus Group Meeting to determine whether he  is a good candidate for DBS surgery.     PLAN:  1. We will discuss his  case at the Movement Disorder Consensus Group Meeting, and we will contact him  regarding his DBS candidacy.         Sincerely,    Camilo Pinedo MD

## 2022-10-18 NOTE — PROGRESS NOTES
Pt was seen for neuropsychological evaluation at the request of Junaid Barrett MD for the purposes of diagnostic clarification and treatment planning. 204 minutes of test administration and scoring were provided by this writer. Please see Dr. Lebron Doyle's report for a full interpretation of the findings.    Rosa Maria Diaz  Psychometrist

## 2022-10-19 ENCOUNTER — ANCILLARY PROCEDURE (OUTPATIENT)
Dept: MRI IMAGING | Facility: CLINIC | Age: 67
End: 2022-10-19
Attending: PSYCHIATRY & NEUROLOGY
Payer: COMMERCIAL

## 2022-10-19 RX ORDER — GADOBUTROL 604.72 MG/ML
7.5 INJECTION INTRAVENOUS ONCE
Status: COMPLETED | OUTPATIENT
Start: 2022-10-19 | End: 2022-10-19

## 2022-10-19 RX ADMIN — GADOBUTROL 3.3 ML: 604.72 INJECTION INTRAVENOUS at 10:21

## 2022-10-19 RX ADMIN — GADOBUTROL 7.5 ML: 604.72 INJECTION INTRAVENOUS at 10:21
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
## 2022-10-22 NOTE — PROGRESS NOTES
Neurosurgery Attending DBS Consult    Chief Complaint   Patient presents with     Consult       HISTORY OF PRESENT ILLNESS  Mr. Reynolds is a 65 y/o right handed gentleman with a h/o bilateral hand tremor that started in his 40s. It is unclear whether his tremor is due to PD or ET. He has recently had a Michael scan which showed reduced uptake in his left putamen. He has recently been evaluated by Felicity Welch who noted asymmetric parkinsonian symptoms including rest tremor, rigidity, badykinesia with slightly reduced arm swing, R>L throughout. His tremors had been stable up until 2 years ago when they started to worsen. He was on lithium for a long time due to longstanding bipolar disorder and was switched off of this to lamotrigine. His tremor really has significantly improved since coming off the lithium. He does not take dopaminergic medications as he could not tolerate carbidopa/levodopa after taking 750 mg/day.     ON/OFF testin OFF, no ON testing due to med intolerance    MRI: Mild cortical atrophy    Neuropsych testing: tomorrow    DBS goals: to improve his right hand tremor for writing, using tools      No past medical history on file.    Past Surgical History:   Procedure Laterality Date     colonoscopy       COLONOSCOPY  2017    F/U 5 yrs.Polyp on the right side benign tubular adenoma 3-4 mm.Bx at 50 cm benign tissue no true polyp. Polyp at 30 cm benign hyperplastic polyp.Mild diverticulosis.     EGD      F/U PRN. Duodenum and stomach normal. Esophagus mild inflammation.Dilated esophagus to 20 mm using balloon.     ENDARTERECTOMY CAROTID Left     left carotid endarterectomy     KNEE SURGERY Left     [facility]     KNEE SURGERY Right      KNEE SURGERY Right 2020    revision     LUNG SURGERY  10/27/2014    [facility]     OTHER SURGICAL HISTORY Left     left cadaver tendon used for biceps tendon tear     OTHER SURGICAL HISTORY Bilateral     bilateral carpal tunnel  surgery       Family History   Problem Relation Age of Onset     Bronchitis Mother      Other - See Comments Mother         blood clot - emboli to brain     Cerebrovascular Disease Mother      Tremor Mother      Lung Cancer Father      Brain Cancer Sister      Tremor Sister      Other - See Comments Sister         kanwal parker     Hypertension Brother      Other - See Comments Brother         [place]     Tremor Brother      Arthritis Brother      Other - See Comments Brother         carroll mccoy     Tremor Brother      Other - See Comments Brother         [place]     Other - See Comments Other         step daughter in [place]     Other - See Comments Grandson         great grand daughter     Other - See Comments Granddaughter         great grand son       Social History     Socioeconomic History     Marital status:      Spouse name: Not on file     Number of children: Not on file     Years of education: Not on file     Highest education level: Not on file   Occupational History     Not on file   Tobacco Use     Smoking status: Former     Smokeless tobacco: Never     Tobacco comments:     former smoker 2014   Substance and Sexual Activity     Alcohol use: Not on file     Drug use: Not Currently     Sexual activity: Not on file   Other Topics Concern     Not on file   Social History Narrative     Not on file     Social Determinants of Health     Financial Resource Strain: Not on file   Food Insecurity: Not on file   Transportation Needs: Not on file   Physical Activity: Not on file   Stress: Not on file   Social Connections: Not on file   Intimate Partner Violence: Not on file   Housing Stability: Not on file          Allergies   Allergen Reactions     Bee Venom Anaphylaxis, Swelling and Hives     Chicken-Derived Products (Egg)      Other reaction(s): Gastrointestinal, Other (see comments)  Can eat egg as ingredient, but egg by itself creates bad GI upset  Can eat egg as ingredient, but egg  "by itself creates bad GI upset       Albumin, Egg Nausea     Aspirin Other (See Comments) and Nausea     Other reaction(s): GI intolerance  tolerates 81 mg EC tablets  GI upset, tolerates 81 mg EC tablets       Empagliflozin Other (See Comments)     Other reaction(s): Other (see comments)  UTI, Yeast infection  UTI, Yeast infection       Levofloxacin Other (See Comments)     Other reaction(s): Other (see comments)  Swelling of arm tendons  Swelling of arm tendons       Metformin      Other reaction(s): Gastrointestinal, GI intolerance     Norfloxacin Other (See Comments)     Other reaction(s): Other (see comments)  Severe headache  Severe headache       Oxycodone Other (See Comments)     Other reaction(s): Other (see comments)  \"Feels Weird\"  \"Feels Weird\"       Penicillins Rash     blotchy, hives  blotchy, hives         Current Outpatient Medications   Medication     albuterol (PROVENTIL) (2.5 MG/3ML) 0.083% neb solution     amitriptyline (ELAVIL) 50 MG tablet     atorvastatin (LIPITOR) 40 MG tablet     dulaglutide (TRULICITY) 1.5 MG/0.5ML pen     EPINEPHrine (ANY BX GENERIC EQUIV) 0.3 MG/0.3ML injection 2-pack     insulin glargine (LANTUS SOLOSTAR) 100 UNIT/ML pen     insulin lispro (HUMALOG KWIKPEN) 100 UNIT/ML (1 unit dial) KWIKPEN     ipratropium (ATROVENT) 0.06 % nasal spray     lamoTRIgine (LAMICTAL) 100 MG tablet     lisinopril (ZESTRIL) 20 MG tablet     olopatadine (PATADAY) 0.2 % ophthalmic solution     polyethylene glycol (MIRALAX) 17 GM/Dose powder     primidone (MYSOLINE) 50 MG tablet     rivaroxaban ANTICOAGULANT (XARELTO) 20 MG TABS tablet     No current facility-administered medications for this visit.         PHYSICAL EXAM  /75   Pulse 111   Resp 16   Wt 108.4 kg (239 lb)   SpO2 96%   BMI 30.48 kg/m      Neurological  Awake, alert and oriented to date, time, place and person. Speech fluent.   Pupils equal, round, reactive to light.  Extraocular movement intact.  Hearing is grossly normal " to finger rub.   Facial sensation intact.  Face symmetric.  Tongue midline.  Uvula elevates equally.    Motor: full strength throughout.  Sensation: intact to light touch and pinpoint.        ASSESSMENT   66 year old right handed gentleman with a history of Parkinson's Disease.    During today's visit, we discussed both phase I and II of DBS surgery. We discussed that Phase I would place the DBS electrode on the left side under MAC and microelectrode recording. He would then return one week later for phase II with placement of the DBS generator/battery over the left chest wall under general anesthesia.     Briefly, the following topics were discussed regarding device options:    Jetaport  MRI compatible.  Non-rechargeable and rechargeable generator/battery available.  8 contact segmented/directional electrode.  Communication method: Wireless/bluetooth.    A[employer]ott  MRI compatible.  Non-rechargeable generator/battery.  8 contact segmented/directional electrode.  Communication method: Wireless/bluetooth.    [employer]  MRI compatible.    Non-rechargeable and rechargeable generator/battery available.  8 contact segmented/directional electrode.  Independent current control at each contacts.  Communication method: Wireless.    I did discuss that the implant technique for these devices are relatively the same which was discussed again.  They all have similar risks associated with the surgery.    The risks, benefits, alternative therapies were discussed with the patient, including but not limited to infection and bleeding. Surgical procedure was discussed in detail. All questions were answered, and he  expressed understanding. His  case will be discussed at the Movement Disorder Consensus Group Meeting to determine whether he  is a good candidate for DBS surgery.     PLAN:  1. We will discuss his  case at the Movement Disorder Consensus Group Meeting, and we will contact him  regarding his DBS candidacy.

## 2022-10-25 ENCOUNTER — TELEPHONE (OUTPATIENT)
Dept: NEUROPSYCHOLOGY | Facility: CLINIC | Age: 67
End: 2022-10-25

## 2022-10-25 NOTE — TELEPHONE ENCOUNTER
"Called Pt, LVM letting them know we could see that the MMPI was in progress but not yet completed. Left a call back number they could reach if they had any questions or trouble completing the questionnaire.    If pt calls and says he finished it, please tell him to go back to the email link and click on it (it should not make him start over as long as its the same link). Once he's there, have him click the next button until he sees a screen that says \"submitted\"  "

## 2022-10-26 NOTE — PROGRESS NOTES
Patient Truman Reynolds DOE 10/18/22    MRN [id number]  Provider NOEL     [birth year]   [facility]    Sex Male   Occupation     Education 12   Language     Preferred Hand Right   Station OP    Age 66                 DEMENTIA RATING SCALE - 2   TEST FORM Standard     Raw MAS       Attention 35 10       Init/Psv 28 4       Construct 6 10       Concept 30 6       Memory 24 10       Total / 144 123 5                WAIS-IV          Raw SS       Comprehension 15 6       Letter Num Seq 15 7       Digit Span 23 9       Matrix Reasoning 9 7                WIDE RANGE ACHIEVEMENT TEST    TEST FORM 5     Raw SS %ile Grade Eq.     Reading 66 113 81 >12.9              WECHSLER MEMORY SCALE - IV         Raw SS/%ile       Info/Orientation 14        Logical Memory I 26 11       Logical Memory II 24 11       LM Recognition  26 >75                [place]ST[place] NAMING TEST         Score (Correct+Stim Cue)  54 MAS 9     # Correct Stimulus Cue  0 T 47     # Correct Phonemic Cue  6                D-KEFS VERBAL FLUENCY    TEST FORM Standard     Raw SS       Letter Fluency 24 6       Category Fluency 21 4       Switching Fluency             Total Correct 9 6       Switching Accuracy 9 8                CLOCK DRAWING         Command Rating - BDL       Copy Rating - BDL       TRAIL MAKING TEST          Seconds Errors MAS z     Trails A 36 1 9 0.07     Trails B  7 - -              STROOP          Raw T MAS      Word 83 40 8      Color  61 41 8      Color/Word 24 40 7               [place] CARD SORTING TEST - 1 deck       # Categories 0 %ile <1      # Persev Error 24 T 32      Concept. Lev Resp. 0 T 23      FMS 0                 FLORES JUDGMENT OF LINE ORIENTATION  TEST FORM H    Raw Score 22 Raw Correction 24     MAS 10 Flores %ile 40              HVLT-R    TEST FORM 1     Raw T       Trial 1 5        Trial 2 7        Trial 3 9        Total 1-3 21 37       Learning 4        Delay 7 37       Percent Retained 78% 41       True Positives 11         Discrimination Index 3 31       False Positives 8                 BRIEF VISUAL MEMORY TEST - REVISED  TEST FORM 1     Raw T       Trial 1 2        Trial 2 4        Trial 3 4        Total 1-3 10 29       Learning 2 41       Delay 4 31       Percent Retained 100% >16 %ile      Recognition Hits 5        Discrimination Index 4 6-10 %ile      False Alarms 1                 STEPHANIE-O COMPLEX FIGURE TEST          Raw %ile       Copy  22 <1       Time to Copy 163 >16                BDI         Score 1        Interpretation Minimal                 APATHY SCALE         Score 3                 QUESTIONNAIRES         ESS         RDBSQ                  MMPI-3                  MAS = [place] Older Adult Normative Study Age / Age & Ed. Adj. Scaled Score

## 2022-10-31 ENCOUNTER — MYC MEDICAL ADVICE (OUTPATIENT)
Dept: NEUROLOGY | Facility: CLINIC | Age: 67
End: 2022-10-31

## 2022-10-31 DIAGNOSIS — G25.0 ESSENTIAL TREMOR: Primary | ICD-10-CM

## 2022-10-31 NOTE — CONFIDENTIAL NOTE
"Received the following message from Truman in the Deep Brain Stimulation Get Well Loop:    \"I would like to increase my prmidone dose. I am currently taking 200 mg 2 times a day morning and evening. When I spoke with one of doctors (not sure which one) said I had room to increase the dose. Please let me know what I can increase the dose to. Thanks\"    "

## 2022-10-31 NOTE — CONFIDENTIAL NOTE
Reviewed with Truman it appears in the past he tried primidone 250 mg BID and it was not tolerated. He states at that time he was also decreasing lithium at the same time and believes it was not well tolerated due to this. He would like to try increasing primidone again. He was told at one of his work up appointments (he thinks) that he has room to increase it.    Goal: He is having trouble with working with tools and fine motor tasks that he is hoping an increase in primidone will help with. He eats with his left hand and this helps him not drop food. He feels his tremors are much better since decreasing lithium overall. Denies resting tremor or issues with this type of tremor. Has tried carbidopa/levodopa in the past but felt this made his tremors worse.    Breathing issues are stable (on albuterol). Mood is stable on lamotrigine. Denies side effects of impaired coordination, dizziness, sleepiness, nausea/vomiting, excitation/increased irritability. He stated he is not sure what he will do in terms of Deep Brain Stimulation, he will continue to think about this but otherwise recalls his workup is good for 10-11 months.    Plan/recommendation:  1. I will connect with Dr. Leon (covering provide for Dr. Barrett)    2. Truman asks that I send a [employer] message to follow up

## 2022-11-02 RX ORDER — PRIMIDONE 50 MG/1
TABLET ORAL
Qty: 60 TABLET | Refills: 11 | Status: SHIPPED | OUTPATIENT
Start: 2022-11-02 | End: 2023-02-17

## 2022-11-02 NOTE — CONFIDENTIAL NOTE
He can increase primidone 50 mg by 1/2 tab weekly.  Week 1, take 225 mg qam / 200 qpm. Week 2, take 225 mg twice daily. Week 3, take 250 mg qam / 225 mg qpm. Week 4, take 250  mg twice daily. Stop as lowest effective dose. As of 5/2022, liver & kidney function and complete blood count were ok so no need to repeat. Please have him schedule a follow up for reevaluation of tremor when possible. If this dose works for him, will change primidone to 250 mg to take 1 tab twice daily.    Thank you!    Vero

## 2022-11-02 NOTE — TELEPHONE ENCOUNTER
He can increase primidone 50 mg by 1/2 tab weekly.  Week 1, take 225 mg qam / 200 qpm. Week 2, take 225 mg twice daily. Week 3, take 250 mg qam / 225 mg qpm. Week 4, take 250  mg twice daily. Stop as lowest effective dose. I put in this medication change. As of 5/2022, liver & kidney function and complete blood count were ok so no need to repeat. Please have him schedule a follow up for reevaluation of tremor when possible.     Thank you!    Vero

## 2022-11-07 ENCOUNTER — TELEPHONE (OUTPATIENT)
Dept: NEUROLOGY | Facility: CLINIC | Age: 67
End: 2022-11-07

## 2022-11-18 ENCOUNTER — TELEPHONE (OUTPATIENT)
Dept: NEUROLOGY | Facility: CLINIC | Age: 67
End: 2022-11-18

## 2022-11-18 NOTE — TELEPHONE ENCOUNTER
From 11/17/22 Consensus:    1. Candidate - yes  Need to obtain letter of support or additional information from his treating mental health providers, such as the psychiatrist managing his medications.   2. The plan for surgery- LGpi wait and see, extension  3. The  - rechargeable BSci Genus  4. The motor symptoms we are treating include: slowness, tremor and rigidity   5. Patient goal: Decrease tremor and Other: to be able to write, eat, and use a screwdriver     Do we have all the imaging sequences needed? Yes    Other indication for surgery: Intolerance to levodopa.     Brain MRI findings: mild atrophy, small left MCA stroke     6. GET WELL LOOP?   7. Research?     Authorization to discuss Protected Health Information: Yes, Graciela (spouse)   Healthcare Directive: None on file   EMUZE use: Uses reliably   ---------------------------------  Spoke to patient. He will speak with his wife and Psychiatrist. Has appointment next week with psychiatrist.    He asked about whether or not he has Parkinson's disease. Writer discussed the daTscan and what it can tell us and what it can't.     Due to patient's MCI, he is at higher risk of cognitive issues after surgery. He would like to meet with Dr. Pinedo to go over his increased risk due to MCI and cardiac issues. Writer will ask Orlin Hearn to call patient next week to arrange a video appointment. We discussed trying the Arelis Trio and that his testing is good for a year.    Patient did not understand his neuropsychological report and would like clarification from Dr. Doyle. Writer will ask Dr. Doyle to call patient next week in the afternoon between 1-4 pm, Thursday doesn't work.    Writer will ask about research and GET WELL LOOP after he decides what he'd like to do.

## 2022-11-29 ENCOUNTER — VIRTUAL VISIT (OUTPATIENT)
Dept: NEUROSURGERY | Facility: CLINIC | Age: 67
End: 2022-11-29
Payer: COMMERCIAL

## 2022-11-29 DIAGNOSIS — G20.A1 PARKINSON DISEASE (H): Primary | ICD-10-CM

## 2022-11-29 PROCEDURE — 99213 OFFICE O/P EST LOW 20 MIN: CPT | Mod: 95 | Performed by: NEUROLOGICAL SURGERY

## 2022-11-29 NOTE — LETTER
11/29/2022       RE: Truman Reynolds  [address]  [facility] [address]     Dear Colleague,    Thank you for referring your patient, Truman Reynolds, to the [facility] [place] [facility]. Please see a copy of my visit note below.    Neurosurgery Attending DBS Follow-Up Note    HPI: Mr. Reynolds is a 67 y/o right handed gentleman who was recently seen and discussed at DBS consensus conference. Consensus was a left GPi DBS 'wait and see' approach to treat his right body symptoms. Because he met criteria for MCI and is on anticoagulation for a small prior stroke, we thought a 'wait and see' approach would be best to ensure that his first side goes well without complication before deciding on moving ahead with his second side. Today, we discussed what that means, what types of cognitive symptoms people sometimes experience after DBS (although he also has a visit with Dr. Doyle scheduled to discuss this in more detail) and how we typically handle anticoagulation before surgery.    Exam (Video visit):  Awake, alert, oriented x 3  Attends, follows, fluent      A/P: 67 y/o right handed gentleman preparing for left GPi DBS. Our visit today was to discuss how his diagnosis of MCI and atrial fibrillation on anticoagulation will affect his upcoming surgical plan.    - Schedule for left GPi DBS ([place] Sci Genus R16 with two extension wires)          Sincerely,    Camilo Pinedo MD

## 2022-11-29 NOTE — PROGRESS NOTES
Neurosurgery Attending DBS Follow-Up Note    HPI: Mr. Reynolds is a 67 y/o right handed gentleman who was recently seen and discussed at DBS consensus conference. Consensus was a left GPi DBS 'wait and see' approach to treat his right body symptoms. Because he met criteria for MCI and is on anticoagulation for a small prior stroke, we thought a 'wait and see' approach would be best to ensure that his first side goes well without complication before deciding on moving ahead with his second side. Today, we discussed what that means, what types of cognitive symptoms people sometimes experience after DBS (although he also has a visit with Dr. oDyle scheduled to discuss this in more detail) and how we typically handle anticoagulation before surgery.    Exam (Video visit):  Awake, alert, oriented x 3  Attends, follows, fluent      A/P: 67 y/o right handed gentleman preparing for left GPi DBS. Our visit today was to discuss how his diagnosis of MCI and atrial fibrillation on anticoagulation will affect his upcoming surgical plan.    - Schedule for left GPi DBS ([place] Sci Genus R16 with two extension wires)      Truman is a 67 year old who is being evaluated via a billable video visit.      How would you like to obtain your AVS? MyChart  If the video visit is dropped, the invitation should be resent by: Text to cell phone: [phone]  Will anyone else be joining your video visit? No        Video-Visit Details    Video Start Time: 9:35    Type of service:  Video Visit    Video End Time:10:00    Originating Location (pt. Location): Home        Distant Location (provider location):  On-site    Platform used for Video Visit: Madiha

## 2022-12-08 PROBLEM — I10 ESSENTIAL HYPERTENSION: Status: ACTIVE | Noted: 2022-08-26

## 2022-12-08 PROBLEM — R90.89 ABNORMAL BRAIN MRI: Status: ACTIVE | Noted: 2022-12-08

## 2022-12-20 DIAGNOSIS — G20.A1 PARKINSON DISEASE (H): Primary | ICD-10-CM

## 2023-01-10 ENCOUNTER — TELEPHONE (OUTPATIENT)
Dept: NEUROSURGERY | Facility: CLINIC | Age: 68
End: 2023-01-10

## 2023-01-10 NOTE — TELEPHONE ENCOUNTER
I called patient to get him set up for surgery, but patient let me know that he would like to hold of for as long as possible and to continue on current medication. I let him know that I would let Dr. Pinedo know incase he was not aware and to reach out so we can restart process if needed.        Cassie Ray on 1/10/2023 at 3:33 PM

## 2023-02-04 ENCOUNTER — HEALTH MAINTENANCE LETTER (OUTPATIENT)
Age: 68
End: 2023-02-04

## 2023-02-16 RX ORDER — LAMOTRIGINE 150 MG/1
1 TABLET ORAL
COMMUNITY
Start: 2022-10-29 | End: 2023-02-27

## 2023-02-16 RX ORDER — CODEINE PHOSPHATE AND GUAIFENESIN 10; 100 MG/5ML; MG/5ML
SOLUTION ORAL
COMMUNITY
Start: 2022-12-20

## 2023-02-16 RX ORDER — MOLNUPIRAVIR 200 MG/1
800 CAPSULE ORAL 2 TIMES DAILY
COMMUNITY
Start: 2022-12-17 | End: 2023-02-27

## 2023-02-16 RX ORDER — LISINOPRIL 40 MG/1
1 TABLET ORAL
COMMUNITY
Start: 2022-12-12 | End: 2023-02-27

## 2023-02-16 RX ORDER — LAMOTRIGINE 100 MG/1
50 TABLET ORAL DAILY
COMMUNITY
Start: 2022-11-10 | End: 2023-02-28

## 2023-02-16 RX ORDER — AMITRIPTYLINE HYDROCHLORIDE 100 MG/1
100 TABLET ORAL AT BEDTIME
COMMUNITY
Start: 2022-11-23 | End: 2023-02-27

## 2023-02-16 NOTE — PROGRESS NOTES
"    VIDEO VISIT    :[phone]    Date of Visit: February 28, 2023  Name: Truman Reynolds  Date of Birth 1955  [address]   [facility] [address]     [phone] (H)   [phone] (W)   [email or web address]     Graciela Reynolds spouse        [phone]      Julio César Hayward MD (Apr. 05, 2022April 05, 2022 - Present)  [phone] (Work)  [phone] (Fax)  [address] [place] [address]  [facility]  [address] [place] [address]      Consent for [facility] records    Assessment:  (G25.0) Familial tremor  (primary encounter diagnosis)  Tremor  Family history of tremor - mother, brothers abraham and ronald and sister juanita  Montserratian    Brain 10/19/2022  Impression:  1. No acute intracranial pathology.  2. 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 This can be  seen with Parkinson disease or Lewy Body Dementia. Recommend clinical  correlation.  3. Several scattered nonspecific T2 hyperintense foci in cerebral  white matter. There are also T2 hyperintense lesions involving the  cortex and subcortical white matter in the left precentral and  postcentral gyrus. Differentials are broad and include sequela of  prior inflammatory/infectious or vascular insults.     Prior stroke 2019     Brain mRI 12/11/2020  1.  No acute infarct, mass, mass effect, or hemorrhage.   2.  Minimal to mild chronic small vessel ischemia.   3.  Mild atrophy     Brain MRI ? 1/26/2022 - unable to find results  CT angio head/neck 1/26/2022 - unable to find results      Tremor - duration \"years\" with onset in his 30s  Progressively worsened in later 40s and has progressed onwards     Lithium - been on it for 30 years and not clear if there is a link.     Right handed and right hand is more affected    Review of diagnosis    Essential tremor - family history positive (does not consume alcohol)  Parkinsonism dopamine scan 9/13/2022  Impression: A presynaptic dopaminergic deficit is " present.  Lithium may be causing worsening tremor    Brain mri 10/19/2022  1. No acute intracranial pathology.  2. 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 This can be  seen with Parkinson disease or Lewy Body Dementia. Recommend clinical  correlation.  3. Several scattered nonspecific T2 hyperintense foci in cerebral  white matter. There are also T2 hyperintense lesions involving the  cortex and subcortical white matter in the left precentral and  postcentral gyrus. Differentials are broad and include sequela of  prior inflammatory/infectious or vascular insults.     Avoidance of dopamine blockers   Not presently     Motor complication review       Visit with Felicity on 10/17/2022  Tremor: Mr. Reynolds is a 66 year old right - handed adult with a longstanding history of bilateral hand tremor that started in his 40s.  His tremors had been stable up until 2 years ago.  At his initial tremor evaluation it was noted that he also had parkinsonian tremor.  He had seen two neurologist before coming to [facility]. The working diagnosis is ET and PD. He has abnormal DaTscan.  Today's exam also showed parkinsonian symptoms including rest tremor, rigidity, and bradykinesia right body worse than left.  He also has slightly reduced arm swing. Patient did not tolerate carbidopa/levodopa after taking 750 mg/day.     Patient is interested to treat the essential tremor that is interfering with his writing, eating, and using a screwdriver.      __ I discussed the difference between the 2 diagnosis and answered all his questions.     __ His dopamine scan was reviewed.     __ Although his tremors have significantly improved after he was taken off Lithium he still wants to pursue DBS surgery to treat the right hand tremor.     __ I discussed setting a reasonable expectation as DBS or any medical treatment will not eliminate his tremors.        Review of Impulse control disorders   -      Review of surgical or medication options    Reviewed  Trial of sinemet - carbidopa/levodopa  Taking primidone  Has some lung issues which are stable     Visit with Dr. Pinedo 11/29/2022  . Consensus was a left GPi DBS 'wait and see' approach to treat his right body symptoms. Because he met criteria for MCI and is on anticoagulation for a small prior stroke, we thought a 'wait and see' approach would be best to ensure that his first side goes well without complication before deciding on moving ahead with his second side. Schedule for left GPi DBS ([place] Sci Genus R16 with two extension wires)    From 11/17/22 Consensus:     1. Candidate - yes  Need to obtain letter of support or additional information from his treating mental health providers, such as the psychiatrist managing his medications.   2. The plan for surgery- LGpi wait and see, extension  3. The  - rechargeable BSci Genus  4. The motor symptoms we are treating include: slowness, tremor and rigidity   5. Patient goal: Decrease tremor and Other: to be able to write, eat, and use a screwdriver      Do we have all the imaging sequences needed? Yes     Other indication for surgery: Intolerance to levodopa.      Brain MRI findings: mild atrophy, small left MCA stroke      Gait/Balance/Falls   Has neuropathy     Exercise/Therapy performed/offered   Not exercising regularly      Cognitive/Driving   No problems driving   Has some forgetfulness  Dr Lebron Doyle 10/18/2022  Results from the present evaluation indicated significant challenges with frontal-subcortical functions, including sequencing, problem solving, divided attention, conceptualization, and planning. Results also revealed difficulty with visual learning, memory, and recognition. Reproduction of visual information was imprecise for tremor on simple items but grossly distorted and lacking gestalt apprehension on complex images. According to Mr. Reynolds and his wife, he continues to drive and independently perform personal-cares  and instrumental activities of daily living without difficulty.      With regard to psychiatric functioning, he endorsed minimal psychiatric symptoms on self-report measures. He denied suicidal ideation, plans, or intentions. Mr. Reynolds and his wife reported improvement in mood, motivation, and energy since his shift from lithium to lamotrigine. They did not indicate concerns that he could be shifting toward a manic phase; it We are awaiting his completion of the MMPI-3 and will file an addendum to this report once it is received.      Mr. Reynolds  current cognitive profile is indicative of mild cognitive impairment (MCI). Executive and complex spatial processing weaknesses can be seen in bipolar disorder and substance abuse disorders, but the level of difficulty seen today likely represents a change from baseline levels. He had a mild left MCA stroke about 3 years ago, but all records indicate no sequelae beyond subtle right upper extremity weakness, and today s data cannot be explained by left MCA territory deficits alone. Overall, his cognitive profile is most consistent with expectations for cognitive changes in Parkinson s disease. Additionally, while it is unclear when cognitive symptoms began, it is most likely that they followed motor symptoms which began 15 years ago. Lastly, records indicate abnormal DATscan, as well as the presence of resting tremor, rigidity, bradykinesia, and a reduced arm swing.      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     Been on lithium for 30 years for depression - and dose has been reduced over time  He has not been on other agents for depression; however he has been  "on medications like trazodone and then amitriptyline and has been on this for 2 years      He had been suicidal and has not been hospitalized for mood issues  At age 16 years - wire attempt to hang himself  Youngest sister \"susan\" has had depressed   since 1982 or so     He has had substance problems in the past =- quit age 36 yrs  Sober of alcohol, and other substances for years  Shot heroin in [place]   Psychedelic medication  Had a dwi long time ago     From chart review 4/2022 note      Pt reports being on on lithium for over ~ 30 years and onset of tremors about ~ 20 years ago. Tremors have not worsened overtime. Did try Carbidopa recently however this caused worsening tremors and nausea so it was discontinued .    Reporting long history of insomnia. Numerous failed med trials, best response to Amitriptyline. Reporting sleep as currently stable.     Mood: \"I'm good. I'm back to my jovial self.\" Diagnosed with bipolar illness during when still struggling with substance abuse. History of grandiosity, decreased need for sleep, AH, impulsivity 7-10 days, up to 5 episodes per year and periods of depression marked by increased isolation, low motivation, low energy, and SI. Prior to current regimen. Has been stable over the years on current regimen. Did attempt to abruptly d/c Lithium on 2 occasions ( ~10-12 years ago and again about 1-2 years ago), which has resulted in mood lability and irritability about 2 weeks after d/c of Lithium.   Anxiety: stable       Past Psychiatric History:  Previous  hospitalizations: [facility] ~39 y/o   Previous med trials: fluoxetine, Abilify, Effexor, Seroquel, Wellbutrin ,Clonazepam, Adderall, Lamictal, trazodone, Doxepin, Nefazodone, Valium, Hydroxyzine, Depakote  Current med trials: Lithium, 300 mg daily and 600 mg HS, Amitriptyline, 75 mg daily   Previous outpatient psychiatry: Dr. Shreya nevarez ~20 years who lost their [place] license in 02/2013 per records, Dr. Riggins from " 05/2013 to 03/2014 and once by Dr. Wood in 2016  Therapy: none current   SA: SA by hanging ~ 16 y/o   SIB: none  History of aggressive or violent behaviour: yes as a minor      Bipolar 1 disorder (HRC)  Assessment & Plan:  Established diagnosis. On Lithium > 30 years. Dosage decrease from 600 mg BID to 300 mg AM and 600 mg HS in ~ 2013, in addition to decreasing polypharmacy by previous psychiatrist Dr. Riggins. Maintained stability on current regimen over the years, with rapid decompensation following pt's own attempt to discontinue Lithium Abruptly ~10-12 years ago and again about 1-2 years ago. Consulting psychiatry today at the recommendation of neurology d/t persisting tremor over the last ~ 20 years that have not worsened in severity. Inquiring about cross tapering lithium to a different agent, as lithium may or may not be contributing to tremors. Previous remote trial of Abilify, does not recall response. Remote trial of Depakote, not effective. Discussed indications, risks and benefits associated with long term use of Lithium. Pt with diabetes so at higher risk of developing kidney dysfunction overtime. Regular monitoring by primary per records. Recent TSH completed by neurologist WNL. [place] level at 1, however pt did take his morning dose prior to lab draw so level likely not accurate/trough level. If pt is to consider tapering, recommend very slow taper over the course of 4-6 months while gradually introducing another agent. Risk of tremors associated with many mood stabilizing agents. Slightly decreased risk with Abilify and/or Seroquel however we can not predict pt's response and risk of decompensation is high. Pt would like to defer any changes today and discuss with his spouse. Will return to clinic if/when ready to consider making a change and we will do so under close monitoring. If patient wanting to start taper prior to writer's planned medical leave in ~06/2022, I will discuss with my team and  other clinic provider if they would be agreeable to follow-up with patient until my return in ~ 09/2022.    Encounter for medication management  Assessment & Plan:  on TCA, x 1 year. Multiple previous failed trials and has responded best to amitriptyline. Given dosage, history of wide QRS, pt's age, and use of lithium, recommend Amitriptyline level and repeat EKG. Recent TSH completed by neurologist MEGHAN. [place] level at 1, however pt did take his morning dose prior to lab draw so level likely not accurate/trough level. Recommend repeat in the AM, to hold morning dose until lab draw.     Orders:  - Ecg 12-Lead Routine - MUSE; Future  - Ecg 12-Lead Routine (Lab perform); Future  - Lithium Level; Future  - Amitriptyline/Nortriptyl; Future    Insomnia, unspecified type  Assessment & Plan:  Stable on TCA, initiated about a year ago. Multiple previous failed trials and has responded best to amitriptyline. Given dosage, history of wide QRS, pt's age, and use of lithium, recommend Amitriptyline level and repeat EKG.     -denies passive or active SI with intent or plan. Denies any acute safety concerns at this time.  -If feeling unsafe, present to nearest ED or call 911   -Questions/concerns were addressed, pt verbalized understanding and is agreeable to plan.    Discussed with the patient/legal guardian my impressions and educated pt on the differential diagnosis and prognosis. I discussed with pt the risks and benefits of medications versus no interventions. Discussed indications, risks/benefits of lithium and amitriptyline, interactions between lithium and amitriptyline including risk of 5-HT syndrome. Questions/Concerns were addressed, pt/ legal guardian verbalized understanding, and is agreeable to plan.     Medications:  -continue Elavil, 75 mg daily   -continue Lithium, 300 Mg daily and 600 mg HS         pharmacy review - see janusz's note below - as well as psychiatrist Ai Buck  Review of lithium,  "amitriptyline and proproposed antidopamine/antipsychotic aripiprazole/abilify.      He has been stable from a mood point of view with the lithium - with good renal function and thyroid function.     Dr. Sanchez - [facility]  Lamotirigine/lamictal trial and dose reduced to 50mg as he has accelerated at a higher dose      Hallucinations/delusions   denies     Sleep   SANGITA - not using cpap  Sleeps in a chair because he is snoring so bad  He has not looked into inspire  Or other surgeries for sleep apnea  He has not seen someone for his sleep for a long time - had seen someone in [place] for this.  Been many years since   930pm goes to bed and wakes up 630 or 7am  Wakes up 1/noc and falls back asleep  Has not had \"crazy dreams for a long time\"    Ongoing sleep apnea issues      Bladder/Renal/Prostate/Gyn/Other   prostate - okay  Nocturia 1/noc  Denies daytime issues - but does urinate \"quite a bit\" during the day - 4 or 5 times per day  Drinking coffee and water especially because of dry mouth      GI/Constipation/GERD   History of hepatitis c - was treated with interferon  Constipation - managed with miralax prn  Denies heartburn      ENDO/Lipid/DM/Bone density/Thyroid  Diabetes mellitus  A1c was 5.6 on 3/31/2022  Lipid panel okay 3/31/2022  Lipid disorder     Has a slight neuropathy - balls of feet    Diabetes with good management A1C        Cardio/heart/Hyper or Hypotensive   Atrial fibrillation - ?intermittent  Stroke  Anticoagulated  ecg 4/7/2022 - results below  Was done in [place] - doctor ordered  Does not have a cardiologist   Has not had h eart tests       Ref Range & Units 3 mo ago   Ventricular Rate BPM 85    Atrial Rate BPM 85    P-R Interval ms 216    QRS Duration ms 116    QT ms 376    QTc ms 447    P Axis degrees 87    R Axis degrees 72    T Axis degrees 83    Resulting Agency   [facility]      Sinus rhythm with 1st degree A-V block   Cannot r/o  Septal infarct , age undetermined "   Abnormal ECG   When compared with ECG of 24-DEC-2019 09:59,   Septal infarct is now Present   Confirmed by Saji Tijerina ([id number]) on 4/12/2022 10:28:49 AM    Anticoagulated due to intermittent atrial fibrillation - ecg was NSR in April with possible septal infarct vs lead  Had normal QTC  ecg today to monitor medication and review findings.  Consideration for echo, etc. If appropriate with PCP  - he is asymptomatic.      Prior stroke 2019    Ct angiogram to followup on his carotid endarterectomy 2019         Vision/Dry Eyes/Cataracts/Glaucoma/Macular   Wears glasses  Itchy eyes with allergies  Denies cataracts  Denies retinal changes from diabetes     Heme/Anticoagulation/Antiplatelet/Anemia/Other  Anticoagulated  Anemia slight cbc 5/2/2022     ENT/Resp  Former smoker 2014  Asthma - no major problems breathing  Lung surgery - lobectomy 2014 - organizing pneumonia right middle lung and it was removed  No loss of smell     Skin/Cancer/Seborrhea/other   no skin cancer      Musculoskeletal/Pain/Headache  Osteoarthritis   Carpal tunnel surgery  Numerous other joint surgeries - 2 right and 1 left knee surgeries  migraine     Other:  Stenosis of left carotid artery  Cerebrovascular accident (CVA) due to stenosis of left middle cerebral artery (HRC) I63.512   Intermittent A. Fib with RVR and the recent stroke lesions with CHADS2 score (high blood pressure, diabetes, and recent stroke, he also has elvi) of 4 - tolerating Zarelto fine w/o bleeding issue. No further focal neuro, TIA or CVA sx's.     MEDICAL DECISION MAKING 8/21/2019 : dr vicenta Morrowy had a stroke 6/14/2019 had a left MCA stroke; this was atheroembolic in mechanism.     He underwent carotid endarterectomy HD #2 and that, along with excelllent in-patient care at [facility] resulted in a very good outcome with minimal deficits.    In addition to left carotid stenosis,, his other stroke risk factors are addressed below.    He has subtle weakness in  RUE in elbow flexion mid supinated and wrist extension, likely UMN though maintain vigilance for radial neuroapthy  Information provided to the patient:     6/14/19 CTA head and neck   CONCLUSION:   HEAD CT:  1.  Normal for age.  2.  No CT finding of a mass, infarct or hemorrhage  HEAD CTA:   1.  No branch vessel occlusion, high-grade stenosis, aneurysm, or high flow vascular malformation involving proximal [place] vessels.  NECK CTA:  1.  90% or greater focal stenosis in the proximal left internal carotid artery at its origin based on NASCET criteria. There is reconstitution of normal vascular caliber distal to this, with maintained patency into the head.  2.  No additional significant stenosis in the cervical vessels based on NASCET criteria.  3.  No evidence for dissection.        Medications               Albuterol proventil 2.5mg/3ml neb solution prn         Amitriptyline Elavil 100 mg     1.5   Atorvastatin lipitor 40mg 1         Carbidopa/levodopa Sinemet 25/100 off         Diphenhydramine benadryl 25mg off         dulaglutide trulicity 1.5mg/0.5ml sundays         Epinephrine epipen  prn         Fluticasone flonase 50mcg/act spray No          Glipizide glucotrol XL 5mg 24hr off         guaifenesin-codeine Robitussin 100-10 prn      Hypromellose-dextran artificial tears prn         Insulin glargine lantus       21 units   Insulin lispro humalog sliding         Ipratropium atrovent 0.06% spray  prn         Lagevrio 200mg monlnupiravir antiviral no      Lamotrigine Lamictal 100 mg 1/2      Lisinopril zestril 40 mg 1          Lithium ER Lithobid 300mg CR off      Loratadine claritin 10mg            Olopatadine pataday 0.2% solution prn         Polyethylene glycol miralax prn         Primidone mysoline 50mg 5     5   RIvaroxaban anticoagulant xarelto 20mg  1         Rizatriptan maxalt 5mg ODT off                                           Plan:    Was off lithium 5 days for the dopamine scan which was  abnormal and reviewed with him and his wife  Also discussed the mri findings    Discussed the importance of following his condition and see how he is functioning.    Working with a therapist and psychiatrist about his fears  Mood stable however he is fearing the worst over his condition    His tremors are better - he has a family history of tremor  He has more tremor in his right than left  Hand tremor  At this point now that he is off lithium and on more mysoline/primidone he can write and handle tools and functions well.     [place] repeating motor evaluation with Felicity Turnertt  - MDS/UPDRS - had tremor rating scale or his preference would be to see Dr. Leon in [place]    Repeat neuropsychological evaluation - had seen Dr Doyle 10/2022  Consider getting another evaluation - possibly in [place] in 2023    Consider using the primidone 250mg tablet twice daily vs 5 x 50mg twice daily    Given that his quality of life is good on his present regimen worth monitoring him clinically.       Medical Decision Making:  #  Chronic progressive medical conditions addressed  Tremor, mood, etc.   Review and/or interpretation of unique test or documentation from a provider outside of neurology brain and dopamine scan   Independent historian provided additional details  yes   Prescription drug management and review of potential side effects and/or monitoring for side effects  yes   Health impacted by social determinants of health  no    I have reviewed the note as documented above.  This accurately captures the substance of my conversation with the patient and total time spent preparing for visit, executing visit and completing visit on the day of the visit:  30 minutes.     Junaid Barrett MD      ------------------------------------------------------------------------------------------------------------------------------------------------------------------------    Video-Visit Details    The patient has been notified of following:  "    \"After a review of the patient s situation, this visit was changed from an in-person visit to a video visit to reduce the risk of COVID-19 exposure.   The patient is being evaluated via a billable video visit.\"    \"This video visit will be conducted via a call between you and your physician/provider. We have found that certain health care needs can be provided without the need for an in-person physical exam.  This service lets us provide the care you need with a video conversation.  If a prescription is necessary we can send it directly to your pharmacy.  If lab work is needed we can place an order for that and you can then stop by our lab to have the test done at a later time.    If during the course of the call the physician/provider feels a video visit is not appropriate, you will not be charged for this service.\"     Patient has given verbal consent for Video visit? Yes    Patient would like the video invitation sent by:     Type of service:  Video Visit    Video Start Time:     Video End Time (time video stopped):     Duration:  minutes - see above    Originating Location (pt. Location):     Distant Location (provider location):  [facility]     Mode of Communication:  Video Conference via [employer] (and if not possible then doximity)      Junaid Barrett MD      --------------------------------------------------------------------------------------------------------------    Truman Reynolds is a 67 year old male who is being evaluated via a billable video visit.      Charts reviewed  Consult from  Images reviewed        I have reviewed and updated the patient's Past Medical History, Social History, Family History and Medication List.    ALLERGIES  Bee venom; Chicken-derived products (egg); Albumin, egg; Aspirin; Empagliflozin; Levofloxacin; Metformin; Norfloxacin; Oxycodone; and Penicillins    Lasts visit details if there was a last visit:       14 Review of systems  are " negative except for   Patient Active Problem List   Diagnosis     Anticoagulated     Lower urinary tract symptoms (LUTS)     Hyperlipidemia     Abnormal involuntary movement     Asthma     Bipolar 1 disorder (H)     Bipolar I disorder with rambo (H)     Carpal tunnel syndrome     Acquired trigger finger     Cerebrovascular accident (CVA) due to stenosis of left middle cerebral artery (H)     Cerebrovascular accident (CVA), unspecified mechanism (H)     DJD (degenerative joint disease)     DM type 2 (diabetes mellitus, type 2) (H)     History of atrial fibrillation     Familial tremor     History of hepatitis C     Insomnia     Intermittent atrial fibrillation (H)     Obesity     Migraine headache     SANGITA (obstructive sleep apnea)     Osteoarthrosis involving multiple sites but not designated as generalized     Polysubstance dependence (H)     Recurrent major depressive episodes (H)     S/P lobectomy of lung     Stenosis of left carotid artery     Tubular adenoma of colon     Abnormal brain MRI     Essential hypertension        Allergies   Allergen Reactions     Bee Venom Anaphylaxis, Swelling and Hives     Chicken-Derived Products (Egg)      Other reaction(s): Gastrointestinal, Other (see comments)  Can eat egg as ingredient, but egg by itself creates bad GI upset  Can eat egg as ingredient, but egg by itself creates bad GI upset       Albumin, Egg Nausea     Aspirin Other (See Comments) and Nausea     Other reaction(s): GI intolerance  tolerates 81 mg EC tablets  GI upset, tolerates 81 mg EC tablets       Empagliflozin Other (See Comments)     Other reaction(s): Other (see comments)  UTI, Yeast infection  UTI, Yeast infection       Levofloxacin Other (See Comments)     Other reaction(s): Other (see comments)  Swelling of arm tendons  Swelling of arm tendons       Metformin      Other reaction(s): Gastrointestinal, GI intolerance     Norfloxacin Other (See Comments)     Other reaction(s): Other (see  "comments)  Severe headache  Severe headache       Oxycodone Other (See Comments)     Other reaction(s): Other (see comments)  \"Feels Weird\"  \"Feels Weird\"       Penicillins Rash     blotchy, hives  blotchy, hives       Past Surgical History:   Procedure Laterality Date     colonoscopy  2009     COLONOSCOPY  03/07/2017    F/U 5 yrs.Polyp on the right side benign tubular adenoma 3-4 mm.Bx at 50 cm benign tissue no true polyp. Polyp at 30 cm benign hyperplastic polyp.Mild diverticulosis.     EGD  2017    F/U PRN. Duodenum and stomach normal. Esophagus mild inflammation.Dilated esophagus to 20 mm using balloon.     ENDARTERECTOMY CAROTID Left 2019    left carotid endarterectomy     KNEE SURGERY Left 2009    [facility]     KNEE SURGERY Right      KNEE SURGERY Right 01/21/2020    revision     LUNG SURGERY  10/27/2014    [facility]     OTHER SURGICAL HISTORY Left     left cadaver tendon used for biceps tendon tear     OTHER SURGICAL HISTORY Bilateral     bilateral carpal tunnel surgery     Past Medical History:   Diagnosis Date     Abnormal brain MRI 12/8/2022    1. No acute intracranial pathology. 2. 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 This can be seen with Parkinson disease or Lewy Body Dementia. Recommend clinical correlation. 3. Several scattered nonspecific T2 hyperintense foci in cerebral white matter. There are also T2 hyperintense lesions involving the cortex and subcortical white matter in the left precentral and postcentral gyrus     Social History     Socioeconomic History     Marital status:      Spouse name: Not on file     Number of children: Not on file     Years of education: Not on file     Highest education level: Not on file   Occupational History     Not on file   Tobacco Use     Smoking status: Former     Smokeless tobacco: Never     Tobacco comments:     former smoker 2014   Substance and Sexual Activity     Alcohol use: Not on file     Drug use: Not Currently "     Sexual activity: Not on file   Other Topics Concern     Not on file   Social History Narrative     Not on file     Social Determinants of Health     Financial Resource Strain: Not on file   Food Insecurity: Not on file   Transportation Needs: Not on file   Physical Activity: Not on file   Stress: Not on file   Social Connections: Not on file   Intimate Partner Violence: Not on file   Housing Stability: Not on file     Family History   Problem Relation Age of Onset     Bronchitis Mother      Other - See Comments Mother         blood clot - emboli to brain     Cerebrovascular Disease Mother      Tremor Mother      Lung Cancer Father      Brain Cancer Sister      Tremor Sister      Other - See Comments Sister         [facility]     Hypertension Brother      Other - See Comments Brother         [place]     Tremor Brother      Arthritis Brother      Other - See Comments Brother         [place]     Tremor Brother      Other - See Comments Brother         [facility]     Other - See Comments Other         step daughter in [place]     Other - See Comments Grandson         great grand daughter     Other - See Comments Granddaughter         great grand son     Current Outpatient Medications   Medication Sig Dispense Refill     amitriptyline (ELAVIL) 100 MG tablet Take 100 mg by mouth At Bedtime       lamoTRIgine (LAMICTAL) 100 MG tablet Take 50 mg by mouth daily       albuterol (PROVENTIL) (2.5 MG/3ML) 0.083% neb solution 2.5 mg by Nebulization route every 6 hours as needed for Wheezing.       amitriptyline (ELAVIL) 50 MG tablet TAKE 1 & 1/2 (ONE & ONE-HALF) TABLETS BY MOUTH AT BEDTIME       atorvastatin (LIPITOR) 40 MG tablet Take 1 tablet by mouth daily       dulaglutide (TRULICITY) 1.5 MG/0.5ML pen Inject 1.5 mg Subcutaneous once a week       EPINEPHrine (ANY BX GENERIC EQUIV) 0.3 MG/0.3ML injection 2-pack Inject 0.3 mg into the muscle as needed       guaiFENesin-codeine (ROBITUSSIN AC) 100-10  MG/5ML solution TAKE 5 TO 10 ML BY MOUTH EVERY 4 HOURS AS NEEDED       insulin glargine (LANTUS SOLOSTAR) 100 UNIT/ML pen Inject 21 Units Subcutaneous At Bedtime       insulin lispro (HUMALOG KWIKPEN) 100 UNIT/ML (1 unit dial) KWIKPEN Inject 5 Units subcutaneously three times daily before meals. Plus correction scale, Target 150, Sensitivity 50, TDD= 25 units       ipratropium (ATROVENT) 0.06 % nasal spray Place 2 Sprays into both nostrils 4 times daily as needed for Allergies.       LAGEVRIO 200 MG capsule Take 800 mg by mouth 2 times daily       lamoTRIgine (LAMICTAL) 100 MG tablet TAKE 1/2 (ONE-HALF) TABLET BY MOUTH ONCE DAILY FOR 14 DAYS THEN 1 TABLET ONCE DAILY FOR 14 DAYS THEN 1 & 1/2 TABLETS ONCE DAILY FOR 14 DAYS THEN 2 TABLETS ONCE DAILY FOR 14 DAYS       lamoTRIgine (LAMICTAL) 150 MG tablet Take 1 tablet by mouth daily at 2 pm       lisinopril (ZESTRIL) 20 MG tablet Take 1 tablet by mouth daily       lisinopril (ZESTRIL) 40 MG tablet Take 1 tablet by mouth daily at 2 pm       olopatadine (PATADAY) 0.2 % ophthalmic solution Place 1 Drop into both eyes daily as needed       polyethylene glycol (MIRALAX) 17 GM/Dose powder Take 17 g by mouth daily as needed       primidone (MYSOLINE) 50 MG tablet Week 1, take 225 mg qam / 200 qpm. Week 2, take 225 mg twice daily. Week 3, take 250 mg qam / 225 mg qpm. Week 4, take 250  mg twice daily. Stop as lowest effective dose. 60 tablet 11     rivaroxaban ANTICOAGULANT (XARELTO) 20 MG TABS tablet Take 20 mg by mouth daily

## 2023-02-17 ENCOUNTER — TELEPHONE (OUTPATIENT)
Dept: NEUROLOGY | Facility: CLINIC | Age: 68
End: 2023-02-17
Payer: COMMERCIAL

## 2023-02-17 DIAGNOSIS — G25.0 ESSENTIAL TREMOR: ICD-10-CM

## 2023-02-17 NOTE — TELEPHONE ENCOUNTER
Health Call Center    Phone Message    May a detailed message be left on voicemail: yes     Reason for Call: Medication Question or concern regarding medication   Prescription Clarification  Name of Medication: primidone (MYSOLINE) 50 MG tablet  Prescribing Provider: Dr. Leon   Pharmacy:   [facility] [address] [place] [address]   What on the order needs clarification? Pt is calling because Pt does not have any refills, he states that the pharmacist does not like how the prescription is wrote. Currently the prescription is wrote as a increasing dose medication and now he is at the 5 tabs 2 times daily. Pt is wondering if the prescription can be wrote as 5 tabs 2 times daily as a new prescription. Please call Pt with any questions        Action Taken: Message routed to:  [facility] ([facility] Neurology    Travel Screening: Not Applicable

## 2023-02-17 NOTE — TELEPHONE ENCOUNTER
primidone (MYSOLINE) 50 MG tablet 60 tablet 11 11/2/2022  No   Sig: Week 1, take 225 mg qam / 200 qpm. Week 2, take 225 mg twice daily. Week 3, take 250 mg qam / 225 mg qpm. Week 4, take 250  mg twice daily. Stop as lowest effective dose.     Teed up new prescription for Dr. Leon to sign  5 tabs by mouth two times per day.

## 2023-02-19 RX ORDER — PRIMIDONE 50 MG/1
TABLET ORAL
Qty: 300 TABLET | Refills: 11 | Status: SHIPPED | OUTPATIENT
Start: 2023-02-19 | End: 2023-02-28

## 2023-02-19 NOTE — TELEPHONE ENCOUNTER
Signing of behalf of Dr. Romero. Patient primarily follows with Dr. Barrett.    Vero Leon DO   of Neurology   [facility]     27.7

## 2023-02-28 ENCOUNTER — VIRTUAL VISIT (OUTPATIENT)
Dept: NEUROLOGY | Facility: CLINIC | Age: 68
End: 2023-02-28
Payer: COMMERCIAL

## 2023-02-28 DIAGNOSIS — G25.0 ESSENTIAL TREMOR: ICD-10-CM

## 2023-02-28 DIAGNOSIS — G25.0 FAMILIAL TREMOR: Primary | ICD-10-CM

## 2023-02-28 PROCEDURE — 99214 OFFICE O/P EST MOD 30 MIN: CPT | Mod: VID | Performed by: PSYCHIATRY & NEUROLOGY

## 2023-02-28 RX ORDER — PRIMIDONE 50 MG/1
TABLET ORAL
Qty: 300 TABLET | Refills: 11 | Status: SHIPPED | OUTPATIENT
Start: 2023-02-28 | End: 2024-03-20

## 2023-02-28 RX ORDER — PRIMIDONE 250 MG/1
250 TABLET ORAL 2 TIMES DAILY
Qty: 180 TABLET | Refills: 3 | Status: SHIPPED | OUTPATIENT
Start: 2023-02-28 | End: 2024-04-24

## 2023-02-28 RX ORDER — LAMOTRIGINE 100 MG/1
0.5 TABLET ORAL DAILY
COMMUNITY
Start: 2023-02-02

## 2023-02-28 RX ORDER — PRIMIDONE 50 MG/1
250 TABLET ORAL 2 TIMES DAILY
COMMUNITY
Start: 2022-03-23 | End: 2023-02-28

## 2023-02-28 RX ORDER — IPRATROPIUM BROMIDE 42 UG/1
2 SPRAY, METERED NASAL 4 TIMES DAILY PRN
COMMUNITY
Start: 2022-12-28 | End: 2023-02-28

## 2023-02-28 RX ORDER — LORATADINE 10 MG/1
10 TABLET ORAL DAILY PRN
COMMUNITY

## 2023-02-28 RX ORDER — AMITRIPTYLINE HYDROCHLORIDE 100 MG/1
TABLET ORAL
COMMUNITY
Start: 2023-02-27 | End: 2023-02-28

## 2023-02-28 RX ORDER — LISINOPRIL 40 MG/1
40 TABLET ORAL DAILY
COMMUNITY
Start: 2022-12-12 | End: 2024-04-24

## 2023-02-28 RX ORDER — AMITRIPTYLINE HYDROCHLORIDE 100 MG/1
2 TABLET ORAL DAILY
COMMUNITY
Start: 2023-02-02

## 2023-02-28 NOTE — PROGRESS NOTES
Truman is a 67 year old who is being evaluated via a billable video visit.      How would you like to obtain your AVS? Mychart  If the video visit is dropped, the invitation should be resent by:[phone]  [email or web address]        Video-Visit Details    Type of service:  Video Visit     Originating Location (pt. Location): home    Distant Location (provider location):  Off-site  Platform used for Video Visit: Madiha

## 2023-02-28 NOTE — PATIENT INSTRUCTIONS
Medications               Albuterol proventil 2.5mg/3ml neb solution prn         Amitriptyline Elavil 100 mg     1.5   Atorvastatin lipitor 40mg 1         Carbidopa/levodopa Sinemet 25/100 off         Diphenhydramine benadryl 25mg off         dulaglutide trulicity 1.5mg/0.5ml sundays         Epinephrine epipen  prn         Fluticasone flonase 50mcg/act spray No          Glipizide glucotrol XL 5mg 24hr off         guaifenesin-codeine Robitussin 100-10 prn      Hypromellose-dextran artificial tears prn         Insulin glargine lantus       21 units   Insulin lispro humalog sliding         Ipratropium atrovent 0.06% spray  prn         Lagevrio 200mg monlnupiravir antiviral no      Lamotrigine Lamictal 100 mg 1/2      Lisinopril zestril 40 mg 1          Lithium ER Lithobid 300mg CR off      Loratadine claritin 10mg            Olopatadine pataday 0.2% solution prn         Polyethylene glycol miralax prn         Primidone mysoline 50mg 5     5   RIvaroxaban anticoagulant xarelto 20mg  1         Rizatriptan maxalt 5mg ODT off                                           Plan:    Was off lithium 5 days for the dopamine scan which was abnormal and reviewed with him and his wife  Also discussed the mri findings    Discussed the importance of following his condition and see how he is functioning.    Working with a therapist and psychiatrist about his fears  Mood stable however he is fearing the worst over his condition    His tremors are better - he has a family history of tremor  He has more tremor in his right than left  Hand tremor  At this point now that he is off lithium and on more mysoline/primidone he can write and handle tools and functions well.     Christian repeating motor evaluation with Felicity Rebekah  - MDS/UPDRS - had tremor rating scale or his preference would be to see Dr. Leon in [place]    Repeat neuropsychological evaluation - had seen Dr Doyle 10/2022  Consider getting another evaluation - possibly in  [place] in 2023    Consider using the primidone 250mg tablet twice daily vs 5 x 50mg twice daily    Given that his quality of life is good on his present regimen worth monitoring him clinically.

## 2023-02-28 NOTE — LETTER
"2/28/2023       RE: Truman Reynolds  [address]  [facility] [address]     Dear Colleague,    Thank you for referring your patient, Truman Reynolds, to the [facility] NEUROLOGY CLINIC [place] at [facility]. Please see a copy of my visit note below.        VIDEO VISIT    :[phone]    Date of Visit: February 28, 2023  Name: Truman Reynolds  Date of Birth 1955  [address]   [facility] [address]     [phone] (H)   [phone] (W)   [email or web address]     Graciela Reynolds spouse  [phone]      Julio César Hayward MD (Apr. 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 No acute intracranial pathology.  2. 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 This can be  seen with Parkinson disease or Lewy Body Dementia. Recommend clinical  correlation.  3. Several scattered nonspecific T2 hyperintense foci in cerebral  white matter. There are also T2 hyperintense lesions involving the  cortex and subcortical white matter in the left precentral and  postcentral gyrus. Differentials are broad and include sequela of  prior inflammatory/infectious or vascular insults.     Prior stroke 2019     Brain mRI 12/11/2020  1.  No acute infarct, mass, mass effect, or hemorrhage.   2.  Minimal to mild chronic small vessel ischemia.   3.  Mild atrophy     Brain MRI ? 1/26/2022 - unable to find results  CT angio head/neck 1/26/2022 - unable to find results      Tremor - duration \"years\" with onset in his 30s  Progressively worsened in later 40s and has progressed " onwards     Lithium - been on it for 30 years and not clear if there is a link.     Right handed and right hand is more affected    Review of diagnosis    Essential tremor - family history positive (does not consume alcohol)  Parkinsonism dopamine scan 9/13/2022  Impression: A presynaptic dopaminergic deficit is present.  Lithium may be causing worsening tremor    Brain mri 10/19/2022  1. No acute intracranial pathology.  2. 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 This can be  seen with Parkinson disease or Lewy Body Dementia. Recommend clinical  correlation.  3. Several scattered nonspecific T2 hyperintense foci in cerebral  white matter. There are also T2 hyperintense lesions involving the  cortex and subcortical white matter in the left precentral and  postcentral gyrus. Differentials are broad and include sequela of  prior inflammatory/infectious or vascular insults.     Avoidance of dopamine blockers   Not presently     Motor complication review       Visit with Felicity on 10/17/2022  Tremor: Mr. Reynolds is a 66 year old right - handed adult with a longstanding history of bilateral hand tremor that started in his 40s.  His tremors had been stable up until 2 years ago.  At his initial tremor evaluation it was noted that he also had parkinsonian tremor.  He had seen two neurologist before coming to [facility]. The working diagnosis is ET and PD. He has abnormal DaTscan.  Today's exam also showed parkinsonian symptoms including rest tremor, rigidity, and bradykinesia right body worse than left.  He also has slightly reduced arm swing. Patient did not tolerate carbidopa/levodopa after taking 750 mg/day.     Patient is interested to treat the essential tremor that is interfering with his writing, eating, and using a screwdriver.      __ I discussed the difference between the 2 diagnosis and answered all his questions.     __ His dopamine scan was reviewed.     __ Although his tremors have significantly  improved after he was taken off Lithium he still wants to pursue DBS surgery to treat the right hand tremor.     __ I discussed setting a reasonable expectation as DBS or any medical treatment will not eliminate his tremors.        Review of Impulse control disorders   -      Review of surgical or medication options   Reviewed  Trial of sinemet - carbidopa/levodopa  Taking primidone  Has some lung issues which are stable     Visit with Dr. Pinedo 11/29/2022  . Consensus was a left GPi DBS 'wait and see' approach to treat his right body symptoms. Because he met criteria for MCI and is on anticoagulation for a small prior stroke, we thought a 'wait and see' approach would be best to ensure that his first side goes well without complication before deciding on moving ahead with his second side. Schedule for left GPi DBS ([place] Sci Genus R16 with two extension wires)    From 11/17/22 Consensus:     1. Candidate - yes  Need to obtain letter of support or additional information from his treating mental health providers, such as the psychiatrist managing his medications.   2. The plan for surgery- LGpi wait and see, extension  3. The  - rechargeable BSci Genus  4. The motor symptoms we are treating include: slowness, tremor and rigidity   5. Patient goal: Decrease tremor and Other: to be able to write, eat, and use a screwdriver      Do we have all the imaging sequences needed? Yes     Other indication for surgery: Intolerance to levodopa.      Brain MRI findings: mild atrophy, small left MCA stroke      Gait/Balance/Falls   Has neuropathy     Exercise/Therapy performed/offered   Not exercising regularly      Cognitive/Driving   No problems driving   Has some forgetfulness  Dr Lebron Doyle 10/18/2022  Results from the present evaluation indicated significant challenges with frontal-subcortical functions, including sequencing, problem solving, divided attention, conceptualization, and planning. Results also  revealed difficulty with visual learning, memory, and recognition. Reproduction of visual information was imprecise for tremor on simple items but grossly distorted and lacking gestalt apprehension on complex images. According to Mr. Reynolds and his wife, he continues to drive and independently perform personal-cares and instrumental activities of daily living without difficulty.      With regard to psychiatric functioning, he endorsed minimal psychiatric symptoms on self-report measures. He denied suicidal ideation, plans, or intentions. Mr. Reynolds and his wife reported improvement in mood, motivation, and energy since his shift from lithium to lamotrigine. They did not indicate concerns that he could be shifting toward a manic phase; it We are awaiting his completion of the MMPI-3 and will file an addendum to this report once it is received.      Mr. Reynolds  current cognitive profile is indicative of mild cognitive impairment (MCI). Executive and complex spatial processing weaknesses can be seen in bipolar disorder and substance abuse disorders, but the level of difficulty seen today likely represents a change from baseline levels. He had a mild left MCA stroke about 3 years ago, but all records indicate no sequelae beyond subtle right upper extremity weakness, and today s data cannot be explained by left MCA territory deficits alone. Overall, his cognitive profile is most consistent with expectations for cognitive changes in Parkinson s disease. Additionally, while it is unclear when cognitive symptoms began, it is most likely that they followed motor symptoms which began 15 years ago. Lastly, records indicate abnormal DATscan, as well as the presence of resting tremor, rigidity, bradykinesia, and a reduced arm swing.      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     Been on lithium for 30 years for depression - and dose has been reduced over time  He has not been on other agents for depression; however he has been on medications like trazodone and then amitriptyline and has been on this for 2 years      He had been suicidal and has not been hospitalized for mood issues  At age 16 years - wire attempt to hang himself  Youngest sister \"susan\" has had depressed   since 1982 or so     He has had substance problems in the past =- quit age 36 yrs  Sober of alcohol, and other substances for years  Shot heroin in [place]   Psychedelic medication  Had a dwi long time ago     From chart review 4/2022 note      Pt reports being on on lithium for over ~ 30 years and onset of tremors about ~ 20 years ago. Tremors have not worsened overtime. Did try Carbidopa recently however this caused worsening tremors and nausea so it was discontinued .    Reporting long history of insomnia. Numerous failed med trials, best response to Amitriptyline. Reporting sleep as currently stable.     Mood: \"I'm good. I'm back to my jovial self.\" Diagnosed with bipolar illness during when still struggling with substance abuse. History of grandiosity, decreased need for sleep, AH, impulsivity 7-10 days, up to 5 episodes per year and periods of depression marked by increased isolation, low motivation, low energy, and SI. Prior to current regimen. Has been stable over the years on current regimen. Did attempt to abruptly d/c Lithium on 2 occasions ( ~10-12 years ago and again about 1-2 years ago), which has resulted in mood lability and irritability about 2 weeks after d/c of Lithium.   Anxiety: stable       Past Psychiatric History:  Previous  hospitalizations: [facility] ~37 y/o   Previous med trials: fluoxetine, Abilify, Effexor, " Seroquel, Wellbutrin ,Clonazepam, Adderall, Lamictal, trazodone, Doxepin, Nefazodone, Valium, Hydroxyzine, Depakote  Current med trials: Lithium, 300 mg daily and 600 mg HS, Amitriptyline, 75 mg daily   Previous outpatient psychiatry: Dr. Shreya nevarez ~20 years who lost their [place] license in 02/2013 per records, Dr. Riggins from 05/2013 to 03/2014 and once by Dr. Wood in 2016  Therapy: none current   SA: SA by hanging ~ 14 y/o   SIB: none  History of aggressive or violent behaviour: yes as a minor      Bipolar 1 disorder (HRC)  Assessment & Plan:  Established diagnosis. On Lithium > 30 years. Dosage decrease from 600 mg BID to 300 mg AM and 600 mg HS in ~ 2013, in addition to decreasing polypharmacy by previous psychiatrist Dr. Riggins. Maintained stability on current regimen over the years, with rapid decompensation following pt's own attempt to discontinue Lithium Abruptly ~10-12 years ago and again about 1-2 years ago. Consulting psychiatry today at the recommendation of neurology d/t persisting tremor over the last ~ 20 years that have not worsened in severity. Inquiring about cross tapering lithium to a different agent, as lithium may or may not be contributing to tremors. Previous remote trial of Abilify, does not recall response. Remote trial of Depakote, not effective. Discussed indications, risks and benefits associated with long term use of Lithium. Pt with diabetes so at higher risk of developing kidney dysfunction overtime. Regular monitoring by primary per records. Recent TSH completed by neurologist WNL. Alverda level at 1, however pt did take his morning dose prior to lab draw so level likely not accurate/trough level. If pt is to consider tapering, recommend very slow taper over the course of 4-6 months while gradually introducing another agent. Risk of tremors associated with many mood stabilizing agents. Slightly decreased risk with Abilify and/or Seroquel however we can not predict pt's response and risk  of decompensation is high. Pt would like to defer any changes today and discuss with his spouse. Will return to clinic if/when ready to consider making a change and we will do so under close monitoring. If patient wanting to start taper prior to writer's planned medical leave in ~06/2022, I will discuss with my team and other clinic provider if they would be agreeable to follow-up with patient until my return in ~ 09/2022.    Encounter for medication management  Assessment & Plan:  on TCA, x 1 year. Multiple previous failed trials and has responded best to amitriptyline. Given dosage, history of wide QRS, pt's age, and use of lithium, recommend Amitriptyline level and repeat EKG. Recent TSH completed by neurologist MEGHAN. Twain level at 1, however pt did take his morning dose prior to lab draw so level likely not accurate/trough level. Recommend repeat in the AM, to hold morning dose until lab draw.     Orders:  - Ecg 12-Lead Routine - MUSE; Future  - Ecg 12-Lead Routine (Lab perform); Future  - Lithium Level; Future  - Amitriptyline/Nortriptyl; Future    Insomnia, unspecified type  Assessment & Plan:  Stable on TCA, initiated about a year ago. Multiple previous failed trials and has responded best to amitriptyline. Given dosage, history of wide QRS, pt's age, and use of lithium, recommend Amitriptyline level and repeat EKG.     -denies passive or active SI with intent or plan. Denies any acute safety concerns at this time.  -If feeling unsafe, present to nearest ED or call 911   -Questions/concerns were addressed, pt verbalized understanding and is agreeable to plan.    Discussed with the patient/legal guardian my impressions and educated pt on the differential diagnosis and prognosis. I discussed with pt the risks and benefits of medications versus no interventions. Discussed indications, risks/benefits of lithium and amitriptyline, interactions between lithium and amitriptyline including risk of 5-HT syndrome.  "Questions/Concerns were addressed, pt/ legal guardian verbalized understanding, and is agreeable to plan.     Medications:  -continue Elavil, 75 mg daily   -continue Lithium, 300 Mg daily and 600 mg HS         pharmacy review - see janusz's note below - as well as psychiatrist Ai Buck  Review of lithium, amitriptyline and proproposed antidopamine/antipsychotic aripiprazole/abilify.      He has been stable from a mood point of view with the lithium - with good renal function and thyroid function.     Dr. Sanchez - [facility]  Lamotirigine/lamictal trial and dose reduced to 50mg as he has accelerated at a higher dose      Hallucinations/delusions   denies     Sleep   SANGITA - not using cpap  Sleeps in a chair because he is snoring so bad  He has not looked into inspire  Or other surgeries for sleep apnea  He has not seen someone for his sleep for a long time - had seen someone in [place] for this.  Been many years since   930pm goes to bed and wakes up 630 or 7am  Wakes up 1/noc and falls back asleep  Has not had \"crazy dreams for a long time\"    Ongoing sleep apnea issues      Bladder/Renal/Prostate/Gyn/Other   prostate - okay  Nocturia 1/noc  Denies daytime issues - but does urinate \"quite a bit\" during the day - 4 or 5 times per day  Drinking coffee and water especially because of dry mouth      GI/Constipation/GERD   History of hepatitis c - was treated with interferon  Constipation - managed with miralax prn  Denies heartburn      ENDO/Lipid/DM/Bone density/Thyroid  Diabetes mellitus  A1c was 5.6 on 3/31/2022  Lipid panel okay 3/31/2022  Lipid disorder     Has a slight neuropathy - balls of feet    Diabetes with good management A1C        Cardio/heart/Hyper or Hypotensive   Atrial fibrillation - ?intermittent  Stroke  Anticoagulated  ecg 4/7/2022 - results below  Was done in [place] - doctor ordered  Does not have a cardiologist   Has not had h eart tests       Ref Range & " Units 3 mo ago   Ventricular Rate BPM 85    Atrial Rate BPM 85    P-R Interval ms 216    QRS Duration ms 116    QT ms 376    QTc ms 447    P Axis degrees 87    R Axis degrees 72    T Axis degrees 83    Resulting Agency   [facility]      Sinus rhythm with 1st degree A-V block   Cannot r/o  Septal infarct , age undetermined   Abnormal ECG   When compared with ECG of 24-DEC-2019 09:59,   Septal infarct is now Present   Confirmed by Saji Tijerina ([id number]) on 4/12/2022 10:28:49 AM    Anticoagulated due to intermittent atrial fibrillation - ecg was NSR in April with possible septal infarct vs lead  Had normal QTC  ecg today to monitor medication and review findings.  Consideration for echo, etc. If appropriate with PCP  - he is asymptomatic.      Prior stroke 2019    Ct angiogram to followup on his carotid endarterectomy 2019         Vision/Dry Eyes/Cataracts/Glaucoma/Macular   Wears glasses  Itchy eyes with allergies  Denies cataracts  Denies retinal changes from diabetes     Heme/Anticoagulation/Antiplatelet/Anemia/Other  Anticoagulated  Anemia slight cbc 5/2/2022     ENT/Resp  Former smoker 2014  Asthma - no major problems breathing  Lung surgery - lobectomy 2014 - organizing pneumonia right middle lung and it was removed  No loss of smell     Skin/Cancer/Seborrhea/other   no skin cancer      Musculoskeletal/Pain/Headache  Osteoarthritis   Carpal tunnel surgery  Numerous other joint surgeries - 2 right and 1 left knee surgeries  migraine     Other:  Stenosis of left carotid artery  Cerebrovascular accident (CVA) due to stenosis of left middle cerebral artery (HRC) I63.512   Intermittent A. Fib with RVR and the recent stroke lesions with CHADS2 score (high blood pressure, diabetes, and recent stroke, he also has elvi) of 4 - tolerating Zarelto fine w/o bleeding issue. No further focal neuro, TIA or CVA sx's.     MEDICAL DECISION MAKING 8/21/2019 : dr vicenta Gaitan had a stroke 6/14/2019 had a left MCA stroke;  this was atheroembolic in mechanism.     He underwent carotid endarterectomy HD #2 and that, along with excelllent in-patient care at [facility] resulted in a very good outcome with minimal deficits.    In addition to left carotid stenosis,, his other stroke risk factors are addressed below.    He has subtle weakness in RUE in elbow flexion mid supinated and wrist extension, likely UMN though maintain vigilance for radial neuroapthy  Information provided to the patient:     6/14/19 CTA head and neck   CONCLUSION:   HEAD CT:  1.  Normal for age.  2.  No CT finding of a mass, infarct or hemorrhage  HEAD CTA:   1.  No branch vessel occlusion, high-grade stenosis, aneurysm, or high flow vascular malformation involving proximal [place] vessels.  NECK CTA:  1.  90% or greater focal stenosis in the proximal left internal carotid artery at its origin based on NASCET criteria. There is reconstitution of normal vascular caliber distal to this, with maintained patency into the head.  2.  No additional significant stenosis in the cervical vessels based on NASCET criteria.  3.  No evidence for dissection.        Medications               Albuterol proventil 2.5mg/3ml neb solution prn         Amitriptyline Elavil 100 mg     1.5   Atorvastatin lipitor 40mg 1         Carbidopa/levodopa Sinemet 25/100 off         Diphenhydramine benadryl 25mg off         dulaglutide trulicity 1.5mg/0.5ml sundays         Epinephrine epipen  prn         Fluticasone flonase 50mcg/act spray No          Glipizide glucotrol XL 5mg 24hr off         guaifenesin-codeine Robitussin 100-10 prn      Hypromellose-dextran artificial tears prn         Insulin glargine lantus       21 units   Insulin lispro humalog sliding         Ipratropium atrovent 0.06% spray  prn         Lagevrio 200mg monlnupiravir antiviral no      Lamotrigine Lamictal 100 mg 1/2      Lisinopril zestril 40 mg 1          Lithium ER Lithobid 300mg CR off      Loratadine claritin 10mg             Olopatadine pataday 0.2% solution prn         Polyethylene glycol miralax prn         Primidone mysoline 50mg 5     5   RIvaroxaban anticoagulant xarelto 20mg  1         Rizatriptan maxalt 5mg ODT off                                           Plan:    Was off lithium 5 days for the dopamine scan which was abnormal and reviewed with him and his wife  Also discussed the mri findings    Discussed the importance of following his condition and see how he is functioning.    Working with a therapist and psychiatrist about his fears  Mood stable however he is fearing the worst over his condition    His tremors are better - he has a family history of tremor  He has more tremor in his right than left  Hand tremor  At this point now that he is off lithium and on more mysoline/primidone he can write and handle tools and functions well.     Mono repeating motor evaluation with [facility]  - MDS/UPDRS - had tremor rating scale or his preference would be to see Dr. Leon in [place]    Repeat neuropsychological evaluation - had seen Dr Doyle 10/2022  Consider getting another evaluation - possibly in [place] in 2023    Consider using the primidone 250mg tablet twice daily vs 5 x 50mg twice daily    Given that his quality of life is good on his present regimen worth monitoring him clinically.       Medical Decision Making:  #  Chronic progressive medical conditions addressed  Tremor, mood, etc.   Review and/or interpretation of unique test or documentation from a provider outside of neurology brain and dopamine scan   Independent historian provided additional details  yes   Prescription drug management and review of potential side effects and/or monitoring for side effects  yes   Health impacted by social determinants of health  no    I have reviewed the note as documented above.  This accurately captures the substance of my conversation with the patient and total time spent preparing for visit, executing visit and  "completing visit on the day of the visit:  30 minutes.     Junaid Barrett MD      ------------------------------------------------------------------------------------------------------------------------------------------------------------------------    Video-Visit Details    The patient has been notified of following:     \"After a review of the patient s situation, this visit was changed from an in-person visit to a video visit to reduce the risk of COVID-19 exposure.   The patient is being evaluated via a billable video visit.\"    \"This video visit will be conducted via a call between you and your physician/provider. We have found that certain health care needs can be provided without the need for an in-person physical exam.  This service lets us provide the care you need with a video conversation.  If a prescription is necessary we can send it directly to your pharmacy.  If lab work is needed we can place an order for that and you can then stop by our lab to have the test done at a later time.    If during the course of the call the physician/provider feels a video visit is not appropriate, you will not be charged for this service.\"     Patient has given verbal consent for Video visit? Yes    Patient would like the video invitation sent by:     Type of service:  Video Visit    Video Start Time:     Video End Time (time video stopped):     Duration:  minutes - see above    Originating Location (pt. Location):     Distant Location (provider location):  [facility] NEUROLOGY [facility]     Mode of Communication:  Video Conference via [employer] (and if not possible then doximity)      Junaid Barrett MD      --------------------------------------------------------------------------------------------------------------    Truman GARCIA Rodolfo is a 67 year old male who is being evaluated via a billable video visit.      Charts reviewed  Consult from  Images reviewed        I have reviewed and updated the patient's " Past Medical History, Social History, Family History and Medication List.    ALLERGIES  Bee venom; Chicken-derived products (egg); Albumin, egg; Aspirin; Empagliflozin; Levofloxacin; Metformin; Norfloxacin; Oxycodone; and Penicillins    Lasts visit details if there was a last visit:       14 Review of systems  are negative except for   Patient Active Problem List   Diagnosis     Anticoagulated     Lower urinary tract symptoms (LUTS)     Hyperlipidemia     Abnormal involuntary movement     Asthma     Bipolar 1 disorder (H)     Bipolar I disorder with rambo (H)     Carpal tunnel syndrome     Acquired trigger finger     Cerebrovascular accident (CVA) due to stenosis of left middle cerebral artery (H)     Cerebrovascular accident (CVA), unspecified mechanism (H)     DJD (degenerative joint disease)     DM type 2 (diabetes mellitus, type 2) (H)     History of atrial fibrillation     Familial tremor     History of hepatitis C     Insomnia     Intermittent atrial fibrillation (H)     Obesity     Migraine headache     SANGITA (obstructive sleep apnea)     Osteoarthrosis involving multiple sites but not designated as generalized     Polysubstance dependence (H)     Recurrent major depressive episodes (H)     S/P lobectomy of lung     Stenosis of left carotid artery     Tubular adenoma of colon     Abnormal brain MRI     Essential hypertension        Allergies   Allergen Reactions     Bee Venom Anaphylaxis, Swelling and Hives     Chicken-Derived Products (Egg)      Other reaction(s): Gastrointestinal, Other (see comments)  Can eat egg as ingredient, but egg by itself creates bad GI upset  Can eat egg as ingredient, but egg by itself creates bad GI upset       Albumin, Egg Nausea     Aspirin Other (See Comments) and Nausea     Other reaction(s): GI intolerance  tolerates 81 mg EC tablets  GI upset, tolerates 81 mg EC tablets       Empagliflozin Other (See Comments)     Other reaction(s): Other (see comments)  UTI, Yeast  "infection  UTI, Yeast infection       Levofloxacin Other (See Comments)     Other reaction(s): Other (see comments)  Swelling of arm tendons  Swelling of arm tendons       Metformin      Other reaction(s): Gastrointestinal, GI intolerance     Norfloxacin Other (See Comments)     Other reaction(s): Other (see comments)  Severe headache  Severe headache       Oxycodone Other (See Comments)     Other reaction(s): Other (see comments)  \"Feels Weird\"  \"Feels Weird\"       Penicillins Rash     blotchy, hives  blotchy, hives       Past Surgical History:   Procedure Laterality Date     colonoscopy  2009     COLONOSCOPY  03/07/2017    F/U 5 yrs.Polyp on the right side benign tubular adenoma 3-4 mm.Bx at 50 cm benign tissue no true polyp. Polyp at 30 cm benign hyperplastic polyp.Mild diverticulosis.     EGD  2017    F/U PRN. Duodenum and stomach normal. Esophagus mild inflammation.Dilated esophagus to 20 mm using balloon.     ENDARTERECTOMY CAROTID Left 2019    left carotid endarterectomy     KNEE SURGERY Left 2009    [facility]     KNEE SURGERY Right      KNEE SURGERY Right 01/21/2020    revision     LUNG SURGERY  10/27/2014    [facility]     OTHER SURGICAL HISTORY Left     left cadaver tendon used for biceps tendon tear     OTHER SURGICAL HISTORY Bilateral     bilateral carpal tunnel surgery     Past Medical History:   Diagnosis Date     Abnormal brain MRI 12/8/2022    1. No acute intracranial pathology. 2. 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 This can be seen with Parkinson disease or Lewy Body Dementia. Recommend clinical correlation. 3. Several scattered nonspecific T2 hyperintense foci in cerebral white matter. There are also T2 hyperintense lesions involving the cortex and subcortical white matter in the left precentral and postcentral gyrus     Social History     Socioeconomic History     Marital status:      Spouse name: Not on file     Number of children: Not on file     Years " of education: Not on file     Highest education level: Not on file   Occupational History     Not on file   Tobacco Use     Smoking status: Former     Smokeless tobacco: Never     Tobacco comments:     former smoker 2014   Substance and Sexual Activity     Alcohol use: Not on file     Drug use: Not Currently     Sexual activity: Not on file   Other Topics Concern     Not on file   Social History Narrative     Not on file     Social Determinants of Health     Financial Resource Strain: Not on file   Food Insecurity: Not on file   Transportation Needs: Not on file   Physical Activity: Not on file   Stress: Not on file   Social Connections: Not on file   Intimate Partner Violence: Not on file   Housing Stability: Not on file     Family History   Problem Relation Age of Onset     Bronchitis Mother      Other - See Comments Mother         blood clot - emboli to brain     Cerebrovascular Disease Mother      Tremor Mother      Lung Cancer Father      Brain Cancer Sister      Tremor Sister      Other - See Comments Sister         [facility]     Hypertension Brother      Other - See Comments Brother         [place]     Tremor Brother      Arthritis Brother      Other - See Comments Brother         [place]     Tremor Brother      Other - See Comments Brother         [facility]     Other - See Comments Other         step daughter in [place]     Other - See Comments Grandson         great grand daughter     Other - See Comments Granddaughter         great grand son     Current Outpatient Medications   Medication Sig Dispense Refill     amitriptyline (ELAVIL) 100 MG tablet Take 100 mg by mouth At Bedtime       lamoTRIgine (LAMICTAL) 100 MG tablet Take 50 mg by mouth daily       albuterol (PROVENTIL) (2.5 MG/3ML) 0.083% neb solution 2.5 mg by Nebulization route every 6 hours as needed for Wheezing.       amitriptyline (ELAVIL) 50 MG tablet TAKE 1 & 1/2 (ONE & ONE-HALF) TABLETS BY MOUTH AT BEDTIME        atorvastatin (LIPITOR) 40 MG tablet Take 1 tablet by mouth daily       dulaglutide (TRULICITY) 1.5 MG/0.5ML pen Inject 1.5 mg Subcutaneous once a week       EPINEPHrine (ANY BX GENERIC EQUIV) 0.3 MG/0.3ML injection 2-pack Inject 0.3 mg into the muscle as needed       guaiFENesin-codeine (ROBITUSSIN AC) 100-10 MG/5ML solution TAKE 5 TO 10 ML BY MOUTH EVERY 4 HOURS AS NEEDED       insulin glargine (LANTUS SOLOSTAR) 100 UNIT/ML pen Inject 21 Units Subcutaneous At Bedtime       insulin lispro (HUMALOG KWIKPEN) 100 UNIT/ML (1 unit dial) KWIKPEN Inject 5 Units subcutaneously three times daily before meals. Plus correction scale, Target 150, Sensitivity 50, TDD= 25 units       ipratropium (ATROVENT) 0.06 % nasal spray Place 2 Sprays into both nostrils 4 times daily as needed for Allergies.       LAGEVRIO 200 MG capsule Take 800 mg by mouth 2 times daily       lamoTRIgine (LAMICTAL) 100 MG tablet TAKE 1/2 (ONE-HALF) TABLET BY MOUTH ONCE DAILY FOR 14 DAYS THEN 1 TABLET ONCE DAILY FOR 14 DAYS THEN 1 & 1/2 TABLETS ONCE DAILY FOR 14 DAYS THEN 2 TABLETS ONCE DAILY FOR 14 DAYS       lamoTRIgine (LAMICTAL) 150 MG tablet Take 1 tablet by mouth daily at 2 pm       lisinopril (ZESTRIL) 20 MG tablet Take 1 tablet by mouth daily       lisinopril (ZESTRIL) 40 MG tablet Take 1 tablet by mouth daily at 2 pm       olopatadine (PATADAY) 0.2 % ophthalmic solution Place 1 Drop into both eyes daily as needed       polyethylene glycol (MIRALAX) 17 GM/Dose powder Take 17 g by mouth daily as needed       primidone (MYSOLINE) 50 MG tablet Week 1, take 225 mg qam / 200 qpm. Week 2, take 225 mg twice daily. Week 3, take 250 mg qam / 225 mg qpm. Week 4, take 250  mg twice daily. Stop as lowest effective dose. 60 tablet 11     rivaroxaban ANTICOAGULANT (XARELTO) 20 MG TABS tablet Take 20 mg by mouth daily             Truman is a 67 year old who is being evaluated via a billable video visit.      How would you like to obtain your AVS? Mychart  If the  video visit is dropped, the invitation should be resent by:[phone]  [email or web address]        Video-Visit Details    Type of service:  Video Visit     Originating Location (pt. Location): home    Distant Location (provider location):  Off-site  Platform used for Video Visit: Madiha

## 2023-03-03 ENCOUNTER — TELEPHONE (OUTPATIENT)
Dept: NEUROLOGY | Facility: CLINIC | Age: 68
End: 2023-03-03
Payer: COMMERCIAL

## 2023-03-03 NOTE — TELEPHONE ENCOUNTER
LVM for pt to sched ...schedule a new patient visit in [place] with Dr. Leon. Dr. Barrett is referring. He'd like her to evaluate his tremor and UPDRS etc.       3/3/23 BD

## 2023-04-04 ENCOUNTER — TRANSFERRED RECORDS (OUTPATIENT)
Dept: MULTI SPECIALTY CLINIC | Facility: CLINIC | Age: 68
End: 2023-04-04

## 2023-04-04 LAB
ALBUMIN (URINE) MG/SPEC: 19.3 MG/L
ALBUMIN/CREATININE RATIO: 27 MG/G
CREATININE (URINE): 71 MG/DL
HBA1C MFR BLD: 6.4 %

## 2023-05-14 ENCOUNTER — HEALTH MAINTENANCE LETTER (OUTPATIENT)
Age: 68
End: 2023-05-14

## 2023-07-25 ENCOUNTER — TELEPHONE (OUTPATIENT)
Dept: NEUROLOGY | Facility: CLINIC | Age: 68
End: 2023-07-25
Payer: MEDICARE

## 2023-07-25 NOTE — TELEPHONE ENCOUNTER
"LVM for pt to call regarding the up coming appt with Dr. Leon. The appointment needs to be rescheduled as he had [employer] for insurance and we don't accept [employer]. Please in form the pt and cancel the appt.    Per Anna Gave..\"we do not take [employer] and they will have to call the number on the back of their insurance card to see where they have coverage. If they ever change insurance they are welcome to call back to schedule with us.\"    7/25/23 BD  "

## 2023-08-06 ENCOUNTER — HEALTH MAINTENANCE LETTER (OUTPATIENT)
Age: 68
End: 2023-08-06

## 2023-12-24 ENCOUNTER — HEALTH MAINTENANCE LETTER (OUTPATIENT)
Age: 68
End: 2023-12-24

## 2024-03-17 DIAGNOSIS — G25.0 ESSENTIAL TREMOR: ICD-10-CM

## 2024-03-19 NOTE — TELEPHONE ENCOUNTER
NICANOR Bassett MA             Please let patient know that knee xray results were normal.  Thanks! T'd up four month supply to last until appt w/Dr. Leon                                          RX Authorization    Medication: primidone (MYSOLINE) 50 MG tablet     Date last refill ordered: 2/28/23    Quantity ordered: 300    # refills: 11    Date of last clinic visit with ordering provider: 2/28/23    Date of next clinic visit with ordering provider: 5/24/24 w/Dr. Leon (New Movement Disorder)

## 2024-03-20 RX ORDER — PRIMIDONE 50 MG/1
TABLET ORAL
Qty: 300 TABLET | Refills: 3 | Status: SHIPPED | OUTPATIENT
Start: 2024-03-20

## 2024-04-15 ENCOUNTER — TRANSFERRED RECORDS (OUTPATIENT)
Dept: HEALTH INFORMATION MANAGEMENT | Facility: CLINIC | Age: 69
End: 2024-04-15
Payer: COMMERCIAL

## 2024-04-22 ENCOUNTER — MEDICAL CORRESPONDENCE (OUTPATIENT)
Dept: HEALTH INFORMATION MANAGEMENT | Facility: CLINIC | Age: 69
End: 2024-04-22
Payer: COMMERCIAL

## 2024-04-24 ENCOUNTER — DOCUMENTATION ONLY (OUTPATIENT)
Dept: CARDIOLOGY | Facility: CLINIC | Age: 69
End: 2024-04-24

## 2024-04-24 ENCOUNTER — OFFICE VISIT (OUTPATIENT)
Dept: CARDIOLOGY | Facility: CLINIC | Age: 69
End: 2024-04-24
Payer: COMMERCIAL

## 2024-04-24 VITALS
WEIGHT: 250 LBS | SYSTOLIC BLOOD PRESSURE: 120 MMHG | RESPIRATION RATE: 16 BRPM | DIASTOLIC BLOOD PRESSURE: 70 MMHG | HEART RATE: 100 BPM | OXYGEN SATURATION: 98 %

## 2024-04-24 DIAGNOSIS — R93.1 AGATSTON CORONARY ARTERY CALCIUM SCORE GREATER THAN 400: ICD-10-CM

## 2024-04-24 DIAGNOSIS — I48.19 PERSISTENT ATRIAL FIBRILLATION (H): ICD-10-CM

## 2024-04-24 DIAGNOSIS — Z01.812 PRE-PROCEDURE LAB EXAM: ICD-10-CM

## 2024-04-24 DIAGNOSIS — I10 BENIGN ESSENTIAL HYPERTENSION: ICD-10-CM

## 2024-04-24 DIAGNOSIS — I25.118 CORONARY ARTERY DISEASE OF NATIVE ARTERY OF NATIVE HEART WITH STABLE ANGINA PECTORIS (H): Primary | ICD-10-CM

## 2024-04-24 DIAGNOSIS — Z79.4 TYPE 2 DIABETES MELLITUS WITH DIABETIC MONONEUROPATHY, WITH LONG-TERM CURRENT USE OF INSULIN (H): ICD-10-CM

## 2024-04-24 DIAGNOSIS — E11.41 TYPE 2 DIABETES MELLITUS WITH DIABETIC MONONEUROPATHY, WITH LONG-TERM CURRENT USE OF INSULIN (H): ICD-10-CM

## 2024-04-24 PROCEDURE — 99205 OFFICE O/P NEW HI 60 MIN: CPT | Performed by: INTERNAL MEDICINE

## 2024-04-24 RX ORDER — BLOOD SUGAR DIAGNOSTIC
STRIP MISCELLANEOUS
COMMUNITY
Start: 2023-10-02 | End: 2024-10-01

## 2024-04-24 RX ORDER — RAMELTEON 8 MG/1
8 TABLET ORAL AT BEDTIME
COMMUNITY
Start: 2024-01-31

## 2024-04-24 RX ORDER — CICLOPIROX 80 MG/ML
SOLUTION TOPICAL
COMMUNITY
Start: 2024-02-19

## 2024-04-24 RX ORDER — OLMESARTAN MEDOXOMIL 40 MG/1
40 TABLET ORAL DAILY
COMMUNITY
Start: 2024-03-07 | End: 2025-03-07

## 2024-04-24 RX ORDER — METOPROLOL SUCCINATE 25 MG/1
25 TABLET, EXTENDED RELEASE ORAL DAILY
Qty: 30 TABLET | Refills: 11 | Status: SHIPPED | OUTPATIENT
Start: 2024-04-24 | End: 2024-06-20

## 2024-04-24 RX ORDER — LANCETS 33 GAUGE
EACH MISCELLANEOUS
COMMUNITY
Start: 2023-09-26

## 2024-04-24 NOTE — PROGRESS NOTES
ALLERGY TO ASPIRIN   Awaiting RA response      From: Anand Michelle MD  Sent: 4/29/2024   7:53 AM CDT  To: Marciluis Fuentes; Amelia Dunn CNP  Subject: RE: question on upcoming cath                    Looks like her allergy is GI  upset. We can give her a loading dose of ASA when she gets here.   Thanks   Anand  ----- Message -----  From: Marci Fuentes  Sent: 4/26/2024  12:30 PM CDT  To: Anand Michelle MD  Subject: question on upcoming cath                        Hi Dr. Michelle,    Pt will be coming to you for a cath next week, he has an allergy to Aspirin, do you want a preload with clopidogrel prior to coming in and/ or any further recommendations at this time?    Thanks,    Marci    Spoke with Pt and spouse regarding recommendations. Message sent to scheduling.  [employer] message sent-christiano    ----- Message -----  From: Salima Lozano  Sent: 4/26/2024  11:53 AM CDT  To: Marci Fuentes  Subject: RE: ralph Pt                                       Case type: CA Poss PCI  Procedure Physician(s): MARIA LUZ/SAMSON  Procedure Date and Patient Arrival Time: Thursday 5/2, with arrival time of 0730  H&P: Completed on 4/24 [facility]  Pre-Procedure Lab Appt: Tuesday 4/30 at  - Please place lab orders if you haven't already!  Alerts/Important Info: anticoag, sleep apnea, diabetic protocol, allergy to Aspirin and on Trulicity. He takes Trulicity on Sunday, last dose 04/21. Can schedule 04/29 and beyond for procedure. Echo same day  Interpretor Requested: n/a    **pt scheduled echo for 5/10 message sent to  to change to same day as procedure per [facility] request.     Thank you,  Salima  ----- Message -----  From: Marci Fuentes  Sent: 4/24/2024   4:12 PM CDT  To: [facility] Procedure  Pool - Lhe  Subject: ralph Pt                                           Case request: cor poss pci  Ordering provider: ralph  H/p: ralph  Alerts: anticoag, sleep apnea, diabetic protocol, allergy to Aspirin and on Trulicity. He takes Trulicity on Sunday,  last dose 04/21. Can schedule 04/29 and beyond for procedure. Echo same day.    Thank you,    Marci Reynolds  [address]  [facility] [address]  [phone] (home)     PCP:  Kamala Metz  H&P completed by:  ralph  Admit date 05/02 Arrival time:  07:30 am  Anticoagulation:  rivaroxaban (XARELTO)  Previous PCI: No  Bypass Grafts: No  Renal Issues: No  Diabetic?: Yes  Device?: No  Type:  Knee replacement and dental implant.   Ambulation status: independent    Reason for Visit:  Telephone call to discuss pre-procedure education in preparation for: Coronary Angiogram with Possible PCI    Procedure Prep:  EKG results obtained, dated: To be completed on day of cath lab procedure  Hemogram results obtained: Completed on 04/30  Basic Metabolic Panel results obtained: Completed on 04/30  Pertinent cardiac test results: echo, ct calcium screening      Patient Education    Your arrival time is 07:30 am.  [place] [address] - Main Entrance of the Hospital  Please plan on being at the hospital all day.  At any time, emergencies and/or urgent cases may come up which could delay the start of your procedure.    COVID Testing Instructions  *Mandatory COVID Testing: ALL Patients will need to complete a COVID test no sooner than 4 days prior to their procedure (regardless of vaccination status).    To schedule COVID testing Please call [phone]  If you want to complete this at an outside facility please call them to find out if they will have the results within the appropriate time frame and their fax number.  You will need to provide us with that information so we can send the order.  The facility completing the test will need to fax the results to [phone]  If you are running into and issues please call us.     Pre-procedure instructions  Take your temperature in the morning prior to coming in.  If your temperature is 100 F please call  [facility] [phone] and notify them.  If you do not have access to a thermometer at home, please come in for testing.  If you are running a temperature your procedure may be rescheduled.  Patient instructed to not Eat or Drink after midnight.  Patient instructed to shower the evening before or the morning of the procedure.  Patient instructed to arrange for transportation home following procedure from a responsible family member or friend. No driving for at least 24 hours.  Patient instructed to have a responsible adult with them for 24 hours post-procedure.  Post-procedure follow up process.  Conscious sedation discussed.      Pre-procedure medication instructions.  Continue medications as scheduled, with a small amount of water on the day of the procedure unless indicated. (NO Diabetic Medications or Blood Thinners)  Pt instructed not to consume Alcohol, Tobacco, Caffeine, or Carbonated beverages 12 hours prior to procedure.  Patient instructed to take 324 mg of Aspirin the night before and morning of procedure: No  Other medication: instructed to only take Metoprolol Succinate 25 mg a.m. of the procedure.              Diabetic Medication Instructions  DO NOT take any oral diabetic medication, short-acting diabetes medications/insulin, humalog or regular insulin the morning of your test  Take   dose of long-acting insulin (Lantus, Levemir) the day of your test  Hold Oral Diabetic on the day of the procedure and for 48 hours after IV contrast given  Remember to  bring your glucometer and insulin with you to take after your test if needed  Do not take Trulicity 1 week prior to procedure              Anticoagulation Medication Instructions     rivaroxaban (XARELTO) - Hold 48 hours prior to procedure    Patient states understanding of procedure and agrees to proceed.    *PATIENTS RECORDS AVAILABLE IN [facility] UNLESS OTHERWISE INDICATED*      Patient Active Problem List   Diagnosis    Anticoagulated    Lower urinary  tract symptoms (LUTS)    Hyperlipidemia    Abnormal involuntary movement    Asthma    Bipolar 1 disorder (H)    Bipolar I disorder with rambo (H)    Carpal tunnel syndrome    Acquired trigger finger    Cerebrovascular accident (CVA) due to stenosis of left middle cerebral artery (H)    Cerebrovascular accident (CVA), unspecified mechanism (H)    DJD (degenerative joint disease)    DM type 2 (diabetes mellitus, type 2) (H)    History of atrial fibrillation    Familial tremor    History of hepatitis C    Insomnia    Intermittent atrial fibrillation (H)    Obesity    Migraine headache    SANGITA (obstructive sleep apnea)    Osteoarthrosis involving multiple sites but not designated as generalized    Polysubstance dependence (H)    Recurrent major depressive episodes (H24)    S/P lobectomy of lung    Stenosis of left carotid artery    Tubular adenoma of colon    Abnormal brain MRI    Essential hypertension       Current Outpatient Medications   Medication Sig Dispense Refill    albuterol (PROVENTIL) (2.5 MG/3ML) 0.083% neb solution 2.5 mg by Nebulization route every 6 hours as needed for Wheezing.      amitriptyline (ELAVIL) 100 MG tablet Take 2 tablets by mouth daily      atorvastatin (LIPITOR) 40 MG tablet Take 1 tablet by mouth daily      blood glucose (ONETOUCH ULTRA) test strip TESTING FOUR TIMES DAILY (BEFORE BREAKFAST, BEFORE LUNCH, BEFORE SUPPER AND 2 HOURS AFTER SUPPER)      ciclopirox (PENLAC) 8 % external solution APPLY SOLUTION TOPICALLY EVERY EVENING      dulaglutide (TRULICITY) 1.5 MG/0.5ML pen Inject 1.5 mg Subcutaneous every 7 days      EPINEPHrine (ANY BX GENERIC EQUIV) 0.3 MG/0.3ML injection 2-pack Inject 0.3 mg into the muscle as needed      guaiFENesin-codeine (ROBITUSSIN AC) 100-10 MG/5ML solution TAKE 5 TO 10 ML BY MOUTH EVERY 4 HOURS AS NEEDED      insulin glargine (LANTUS SOLOSTAR) 100 UNIT/ML pen Inject 21 Units Subcutaneous At Bedtime      insulin lispro (HUMALOG KWIKPEN) 100 UNIT/ML (1 unit dial)  KWIKPEN Inject 5 Units subcutaneously three times daily before meals. Plus correction scale, Target 150, Sensitivity 50, TDD= 25 units      ipratropium (ATROVENT) 0.06 % nasal spray Place 2 Sprays into both nostrils 4 times daily as needed for Allergies.      lamoTRIgine (LAMICTAL) 100 MG tablet Take 0.5 tablets by mouth daily      Lancets (ONETOUCH DELICA PLUS [id number]) MISC       loratadine (CLARITIN) 10 MG tablet Take 10 mg by mouth daily as needed      metoprolol succinate ER (TOPROL XL) 25 MG 24 hr tablet Take 1 tablet (25 mg) by mouth daily 30 tablet 11    olmesartan (BENICAR) 40 MG tablet Take 40 mg by mouth daily      olopatadine (PATADAY) 0.2 % ophthalmic solution Place 1 Drop into both eyes daily as needed      polyethylene glycol (MIRALAX) 17 GM/Dose powder Take 17 g by mouth daily as needed      primidone (MYSOLINE) 50 MG tablet TAKE 5 TABLETS BY MOUTH TWICE DAILY 300 tablet 3    ramelteon (ROZEREM) 8 MG tablet Take 8 mg by mouth at bedtime      rivaroxaban ANTICOAGULANT (XARELTO) 20 MG TABS tablet Take 20 mg by mouth daily         Allergies   Allergen Reactions    Bee Venom Anaphylaxis, Swelling and Hives    Chicken-Derived Products (Egg)      Other reaction(s): Gastrointestinal, Other (see comments)  Can eat egg as ingredient, but egg by itself creates bad GI upset  Can eat egg as ingredient, but egg by itself creates bad GI upset    Other reaction(s): Gastrointestinal  Can eat egg as ingredient, but egg by itself creates bad GI upset  Other reaction(s): Gastrointestinal, Other (see comments)  Can eat egg as ingredient, but egg by itself creates bad GI upset  Can eat egg as ingredient, but egg by itself creates bad GI upset    Egg White (Egg Protein) Nausea    Sinemet [Carbidopa W-Levodopa]      Tremor increased    Aspirin Other (See Comments) and Nausea     Other reaction(s): GI intolerance  tolerates 81 mg EC tablets  GI upset, tolerates 81 mg EC tablets    GI upset, tolerates 81 mg EC tablets     "Empagliflozin Other (See Comments)     Other reaction(s): Other (see comments)  UTI, Yeast infection  UTI, Yeast infection    UTI, Yeast infection    Levofloxacin Other (See Comments)     Other reaction(s): Other (see comments)  Swelling of arm tendons  Swelling of arm tendons    Swelling of arm tendons    Metformin      Other reaction(s): Gastrointestinal, GI intolerance  Other reaction(s): Gastrointestinal  GI intolerance    Norfloxacin Other (See Comments)     Other reaction(s): Other (see comments)  Severe headache  Severe headache    Severe headache    Oxycodone Other (See Comments)     Other reaction(s): Other (see comments)  \"Feels Weird\"  \"Feels Weird\"    \"Feels Weird\"    Penicillins Rash     blotchy, hives  blotchy, hives         Marci Fuentes on 4/24/2024 at 11:53 AM     From: Tai Fajardo MD  Sent: 4/24/2024   9:11 AM CDT  To: Marci Fuentes    Please help arrange for a coronary angiogram and possible PCI with echo same day. Okay to hold xarelto for procedure per protocol.  Thank you  ALEXEI    "

## 2024-04-24 NOTE — Clinical Note
Please help arrange for a coronary angiogram and possible PCI with echo same day. Okay to hold xarelto for procedure per protocol. Thank you ALEXEI

## 2024-04-24 NOTE — PROGRESS NOTES
[facility] HEART CARE   [address]  [place] [address]   [email or web address]   OFFICE: [phone]     CARDIOLOGY CLINIC NOTE     Thank you, Kamala North, for asking the [facility] Heart Care team to see Mr. Truman Reynolds to evaluate New Patient         Assessment/Recommendations   Assessment:    Coronary artery disease with a severely elevated coronary calcium score of >1000 - he has dyspnea and chest discomfort with exertion that is consistent with angina. He has a very high pretest probability of having obstructive disease so will recommend proceeding directly to coronary angiogram.  Persistent atrial fibrillation - currently in atrial fibrillation with borderline controlled rate. CHADS2-vasc score of at least 6 (age, HTN, CAD, DMII, CVA). On rivaroxaban for stroke prevention.  Hypertension - controlled.  Dyslipidemia - on atorvastatin 40 mg    Plan:  Start metoprolol succinate 25 mg daily  Continue other medications without changes  Refer for coronary angiogram with possible PCI and echocardiogram to assess symptoms and degree of obstructive coronary artery disease. Discussed with the patient the risks and benefits of left heart catheterization with coronary angiogram including possible PCI. The risks include but are not limited to death, myocardial infarction, stroke, kidney dysfunction, vessel trauma, hemorrhage, need for emergency corrective surgery, allergy, and dysrhythmia.  Discussed heart-healthy diet  Follow up in 2 months.         History of Present Illness   Mr. Truman Reynolds is a 68 year old male with a significant past history of PAF, HTN, bipolar type I, DMII, SANGITA, CVA, R lower lobe pneumonectomy due to organizing pneumonia, who presents for evaluation of coronary artery disease.    Mr. Reynolds was recently evaluated with a CT cardiac calcium score of >1000. He has several CAD risk factors including age, sex, obesity, DMII, prior  tobacco use. He has noticed an increase in VASQUEZ and chest aching with physical exertion that is relieved with rest over the past 6 months. He has symptoms with walking <1/2 mile. Previously he could walk a couple miles without stopping.       Other than noted above, Mr. Reynolds denies any chest pain/pressure/tightness, shortness of breath at rest or with exertion, light headedness/dizziness, pre-syncope, syncope, lower extremity swelling, palpitations, paroxysmal nocturnal dyspnea (PND), or orthopnea.     Cardiac Problems and Cardiac Diagnostics     Most Recent Cardiac testing:  ECG dated 8/15/14 (personaly reviewed and interpreted): sinus bradycardia, otherwise normal.    CT cardiac calcium score 4/19/24  Summary    1. The patient's total coronary artery calcium score is 1238 using the Agatston scoring method. The patient is above the 90th percentile for age and gender.            Medications  Allergies   Current Outpatient Medications   Medication Sig Dispense Refill    albuterol (PROVENTIL) (2.5 MG/3ML) 0.083% neb solution 2.5 mg by Nebulization route every 6 hours as needed for Wheezing.      amitriptyline (ELAVIL) 100 MG tablet Take 2 tablets by mouth daily      blood glucose (ONETOUCH ULTRA) test strip TESTING FOUR TIMES DAILY (BEFORE BREAKFAST, BEFORE LUNCH, BEFORE SUPPER AND 2 HOURS AFTER SUPPER)      ciclopirox (PENLAC) 8 % external solution APPLY SOLUTION TOPICALLY EVERY EVENING      dulaglutide (TRULICITY) 1.5 MG/0.5ML pen Inject 1.5 mg Subcutaneous every 7 days      EPINEPHrine (ANY BX GENERIC EQUIV) 0.3 MG/0.3ML injection 2-pack Inject 0.3 mg into the muscle as needed      guaiFENesin-codeine (ROBITUSSIN AC) 100-10 MG/5ML solution TAKE 5 TO 10 ML BY MOUTH EVERY 4 HOURS AS NEEDED      insulin lispro (HUMALOG KWIKPEN) 100 UNIT/ML (1 unit dial) KWIKPEN Inject 5 Units subcutaneously three times daily before meals. Plus correction scale, Target 150, Sensitivity 50, TDD= 25 units      ipratropium (ATROVENT)  0.06 % nasal spray Place 2 Sprays into both nostrils 4 times daily as needed for Allergies.      lamoTRIgine (LAMICTAL) 100 MG tablet Take 0.5 tablets by mouth daily      Lancets (ONETOUCH DELICA PLUS [id number]) MISC       loratadine (CLARITIN) 10 MG tablet Take 10 mg by mouth daily as needed      olmesartan (BENICAR) 40 MG tablet Take 40 mg by mouth daily      olopatadine (PATADAY) 0.2 % ophthalmic solution Place 1 Drop into both eyes daily as needed      polyethylene glycol (MIRALAX) 17 GM/Dose powder Take 17 g by mouth daily as needed      primidone (MYSOLINE) 50 MG tablet TAKE 5 TABLETS BY MOUTH TWICE DAILY 300 tablet 3    ramelteon (ROZEREM) 8 MG tablet Take 8 mg by mouth at bedtime      atorvastatin (LIPITOR) 40 MG tablet Take 1 tablet by mouth daily (Patient not taking: Reported on 4/24/2024)      insulin glargine (LANTUS SOLOSTAR) 100 UNIT/ML pen Inject 21 Units Subcutaneous At Bedtime      lisinopril (ZESTRIL) 40 MG tablet Take 40 mg by mouth daily      primidone (MYSOLINE) 250 MG tablet Take 1 tablet (250 mg) by mouth 2 times daily (Patient not taking: Reported on 4/24/2024) 180 tablet 3    rivaroxaban ANTICOAGULANT (XARELTO) 20 MG TABS tablet Take 20 mg by mouth daily        Allergies   Allergen Reactions    Bee Venom Anaphylaxis, Swelling and Hives    Chicken-Derived Products (Egg)      Other reaction(s): Gastrointestinal, Other (see comments)  Can eat egg as ingredient, but egg by itself creates bad GI upset  Can eat egg as ingredient, but egg by itself creates bad GI upset    Other reaction(s): Gastrointestinal  Can eat egg as ingredient, but egg by itself creates bad GI upset  Other reaction(s): Gastrointestinal, Other (see comments)  Can eat egg as ingredient, but egg by itself creates bad GI upset  Can eat egg as ingredient, but egg by itself creates bad GI upset    Egg White (Egg Protein) Nausea    Sinemet [Carbidopa W-Levodopa]      Tremor increased    Aspirin Other (See Comments) and Nausea      "Other reaction(s): GI intolerance  tolerates 81 mg EC tablets  GI upset, tolerates 81 mg EC tablets    GI upset, tolerates 81 mg EC tablets    Empagliflozin Other (See Comments)     Other reaction(s): Other (see comments)  UTI, Yeast infection  UTI, Yeast infection    UTI, Yeast infection    Levofloxacin Other (See Comments)     Other reaction(s): Other (see comments)  Swelling of arm tendons  Swelling of arm tendons    Swelling of arm tendons    Metformin      Other reaction(s): Gastrointestinal, GI intolerance  Other reaction(s): Gastrointestinal  GI intolerance    Norfloxacin Other (See Comments)     Other reaction(s): Other (see comments)  Severe headache  Severe headache    Severe headache    Oxycodone Other (See Comments)     Other reaction(s): Other (see comments)  \"Feels Weird\"  \"Feels Weird\"    \"Feels Weird\"    Penicillins Rash     blotchy, hives  blotchy, hives          Physical Examination Review of Systems   Vitals: /70 (BP Location: Left arm, Patient Position: Sitting, Cuff Size: Adult Large)   Pulse 100   Resp 16   Wt 113.4 kg (250 lb)   SpO2 98%   BMI= There is no height or weight on file to calculate BMI.  Wt Readings from Last 3 Encounters:   04/24/24 113.4 kg (250 lb)   10/18/22 108.4 kg (239 lb)   10/17/22 108.8 kg (239 lb 12.8 oz)       General: pleasant male. No acute distress.   Neck: No JVD  Lungs: clear to auscultation  COR: borderline fast rate, irregularly irregular rhythm, No murmurs, rubs, or gallops  Extrem: No edema        Please refer above for cardiac ROS details.       Past History     Family History:   Family History   Problem Relation Age of Onset    Bronchitis Mother     Other - See Comments Mother         blood clot - emboli to brain    Cerebrovascular Disease Mother     Tremor Mother     Lung Cancer Father     Brain Cancer Sister     Tremor Sister     Other - See Comments Sister         [facility]    Hypertension Brother     Other - See Comments Brother         " [place]    Tremor Brother     Arthritis Brother     Other - See Comments Brother         carroll mccoy    Tremor Brother     Other - See Comments Brother         [place]    Other - See Comments Other         step daughter in [place]    Other - See Comments Grandson         great grand daughter    Other - See Comments Granddaughter         great grand son       Social History:   Social History     Socioeconomic History    Marital status:      Spouse name: Not on file    Number of children: Not on file    Years of education: Not on file    Highest education level: Not on file   Occupational History    Not on file   Tobacco Use    Smoking status: Former    Smokeless tobacco: Never    Tobacco comments:     former smoker 2014   Substance and Sexual Activity    Alcohol use: Not on file    Drug use: Not Currently    Sexual activity: Not on file   Other Topics Concern    Not on file   Social History Narrative    Medical history is significant for essential hypertension, bipolar disorder type 1, abnormal involuntary movement, acquired trigger finger, asthma, carpal tunnel syndrome, degenerative joint disease, diabetes mellitus type 2, familial tremor, history of atrial fibrillation, history of hepatitis C, hyperlipidemia, lower urinary tract symptoms, migraine headache, obesity, obstructive sleep apnea, osteoarthritis, polysubstance dependence, recurrent major depressive episodes, lobectomy of lung, and tubular adenoma of colon. He sustained a cerebrovascular accident due to stenosis of left middle cerebral artery in 2019. He denied history of concussion, traumatic brain injury, and seizure. He reported remote history of migraines. 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 Family medical and psychiatric history is significant for tremor (mother, 2 brothers), brain tumor (sister), and alcohol use disorder (maternal grandfather, paternal uncles).      Social Determinants of Health     Financial  Resource Strain: Not At Risk (6/18/2023)    Received from [facility]    Financial Resource Strain     Is it hard for you to pay for the very basics like food, housing, medical care or heating?: No   Food Insecurity: Not At Risk (6/18/2023)    Received from [facility]    Food Insecurity     Does your food run out before you have the money to buy more?: No   Transportation Needs: Not At Risk (6/18/2023)    Received from [facility]    Transportation Needs     Does a lack of transportation keep you from your medical appointments or from getting your medications?: No   Physical Activity: Insufficiently Active (2/28/2022)    Received from [facility]    Exercise Vital Sign     Days of Exercise per Week: 1 day     Minutes of Exercise per Session: 20 min   Stress: No Stress Concern Present (2/28/2022)    Received from [facility]    [employer] [place] [employer] - Occupational Stress Questionnaire     Feeling of Stress : Not at all   Social Connections: Moderately Integrated (2/28/2022)    Received from [facility]    Social Connection and Isolation Panel [NHANES]     Frequency of Communication with Friends and Family: Three times a week     Frequency of Social Gatherings with Friends and Family: Three times a week     Attends [employer] Services: Never     Active Member of Clubs or Organizations: Yes     Attends Club or Organization Meetings: More than 4 times per year     Marital Status:    Interpersonal Safety: Not At Risk (2/28/2022)    Received from [facility]    Humiliation, Afraid, Rape, and Kick questionnaire     Fear of Current or Ex-Partner: No     Emotionally Abused: No     Physically Abused: No     Sexually Abused: No   Housing Stability: Low Risk  (2/28/2022)    Received from [facility]    Housing Stability Vital Sign     Unable to Pay for Housing in the Last Year: No     In the last 12 months, how many places have you lived?: 1     In the last 12 months, was there a time when you  "did not have a steady place to sleep or slept in a shelter (including now)?: No            Lab Results    Chemistry/lipid CBC Cardiac Enzymes/BNP/TSH/INR   Lab Results   Component Value Date    TRIG 63 03/31/2022    No results found for: \"WBC\", \"HGB\", \"HCT\", \"MCV\", \"PLT\" Lab Results   Component Value Date    INR 1.2 (H) 05/02/2022            "

## 2024-04-24 NOTE — H&P (VIEW-ONLY)
[facility]   [address]  [place] [address]   [email or web address]   OFFICE: [phone]     CARDIOLOGY CLINIC NOTE     Thank you, Kamala North, for asking the [facility] Heart [facility] team to see Mr. Truman Reynolds to evaluate New Patient         Assessment/Recommendations   Assessment:    Coronary artery disease with a severely elevated coronary calcium score of >1000 - he has dyspnea and chest discomfort with exertion that is consistent with angina. He has a very high pretest probability of having obstructive disease so will recommend proceeding directly to coronary angiogram.  Persistent atrial fibrillation - currently in atrial fibrillation with borderline controlled rate. CHADS2-vasc score of at least 6 (age, HTN, CAD, DMII, CVA). On rivaroxaban for stroke prevention.  Hypertension - controlled.  Dyslipidemia - on atorvastatin 40 mg    Plan:  Start metoprolol succinate 25 mg daily  Continue other medications without changes  Refer for coronary angiogram with possible PCI and echocardiogram to assess symptoms and degree of obstructive coronary artery disease. Discussed with the patient the risks and benefits of left heart catheterization with coronary angiogram including possible PCI. The risks include but are not limited to death, myocardial infarction, stroke, kidney dysfunction, vessel trauma, hemorrhage, need for emergency corrective surgery, allergy, and dysrhythmia.  Discussed heart-healthy diet  Follow up in 2 months.         History of Present Illness   Mr. Truman Reynolds is a 68 year old male with a significant past history of PAF, HTN, bipolar type I, DMII, SANGITA, CVA, R lower lobe pneumonectomy due to organizing pneumonia, who presents for evaluation of coronary artery disease.    Mr. Reynolds was recently evaluated with a CT cardiac calcium score of >1000. He has several CAD risk factors including age, sex, obesity, DMII, prior  tobacco use. He has noticed an increase in VASQUEZ and chest aching with physical exertion that is relieved with rest over the past 6 months. He has symptoms with walking <1/2 mile. Previously he could walk a couple miles without stopping.       Other than noted above, Mr. Reynolds denies any chest pain/pressure/tightness, shortness of breath at rest or with exertion, light headedness/dizziness, pre-syncope, syncope, lower extremity swelling, palpitations, paroxysmal nocturnal dyspnea (PND), or orthopnea.     Cardiac Problems and Cardiac Diagnostics     Most Recent Cardiac testing:  ECG dated 8/15/14 (personaly reviewed and interpreted): sinus bradycardia, otherwise normal.    CT cardiac calcium score 4/19/24  Summary    1. The patient's total coronary artery calcium score is 1238 using the Agatston scoring method. The patient is above the 90th percentile for age and gender.            Medications  Allergies   Current Outpatient Medications   Medication Sig Dispense Refill    albuterol (PROVENTIL) (2.5 MG/3ML) 0.083% neb solution 2.5 mg by Nebulization route every 6 hours as needed for Wheezing.      amitriptyline (ELAVIL) 100 MG tablet Take 2 tablets by mouth daily      blood glucose (ONETOUCH ULTRA) test strip TESTING FOUR TIMES DAILY (BEFORE BREAKFAST, BEFORE LUNCH, BEFORE SUPPER AND 2 HOURS AFTER SUPPER)      ciclopirox (PENLAC) 8 % external solution APPLY SOLUTION TOPICALLY EVERY EVENING      dulaglutide (TRULICITY) 1.5 MG/0.5ML pen Inject 1.5 mg Subcutaneous every 7 days      EPINEPHrine (ANY BX GENERIC EQUIV) 0.3 MG/0.3ML injection 2-pack Inject 0.3 mg into the muscle as needed      guaiFENesin-codeine (ROBITUSSIN AC) 100-10 MG/5ML solution TAKE 5 TO 10 ML BY MOUTH EVERY 4 HOURS AS NEEDED      insulin lispro (HUMALOG KWIKPEN) 100 UNIT/ML (1 unit dial) KWIKPEN Inject 5 Units subcutaneously three times daily before meals. Plus correction scale, Target 150, Sensitivity 50, TDD= 25 units      ipratropium (ATROVENT)  0.06 % nasal spray Place 2 Sprays into both nostrils 4 times daily as needed for Allergies.      lamoTRIgine (LAMICTAL) 100 MG tablet Take 0.5 tablets by mouth daily      Lancets (ONETOUCH DELICA PLUS [id number]) MISC       loratadine (CLARITIN) 10 MG tablet Take 10 mg by mouth daily as needed      olmesartan (BENICAR) 40 MG tablet Take 40 mg by mouth daily      olopatadine (PATADAY) 0.2 % ophthalmic solution Place 1 Drop into both eyes daily as needed      polyethylene glycol (MIRALAX) 17 GM/Dose powder Take 17 g by mouth daily as needed      primidone (MYSOLINE) 50 MG tablet TAKE 5 TABLETS BY MOUTH TWICE DAILY 300 tablet 3    ramelteon (ROZEREM) 8 MG tablet Take 8 mg by mouth at bedtime      atorvastatin (LIPITOR) 40 MG tablet Take 1 tablet by mouth daily (Patient not taking: Reported on 4/24/2024)      insulin glargine (LANTUS SOLOSTAR) 100 UNIT/ML pen Inject 21 Units Subcutaneous At Bedtime      lisinopril (ZESTRIL) 40 MG tablet Take 40 mg by mouth daily      primidone (MYSOLINE) 250 MG tablet Take 1 tablet (250 mg) by mouth 2 times daily (Patient not taking: Reported on 4/24/2024) 180 tablet 3    rivaroxaban ANTICOAGULANT (XARELTO) 20 MG TABS tablet Take 20 mg by mouth daily        Allergies   Allergen Reactions    Bee Venom Anaphylaxis, Swelling and Hives    Chicken-Derived Products (Egg)      Other reaction(s): Gastrointestinal, Other (see comments)  Can eat egg as ingredient, but egg by itself creates bad GI upset  Can eat egg as ingredient, but egg by itself creates bad GI upset    Other reaction(s): Gastrointestinal  Can eat egg as ingredient, but egg by itself creates bad GI upset  Other reaction(s): Gastrointestinal, Other (see comments)  Can eat egg as ingredient, but egg by itself creates bad GI upset  Can eat egg as ingredient, but egg by itself creates bad GI upset    Egg White (Egg Protein) Nausea    Sinemet [Carbidopa W-Levodopa]      Tremor increased    Aspirin Other (See Comments) and Nausea      "Other reaction(s): GI intolerance  tolerates 81 mg EC tablets  GI upset, tolerates 81 mg EC tablets    GI upset, tolerates 81 mg EC tablets    Empagliflozin Other (See Comments)     Other reaction(s): Other (see comments)  UTI, Yeast infection  UTI, Yeast infection    UTI, Yeast infection    Levofloxacin Other (See Comments)     Other reaction(s): Other (see comments)  Swelling of arm tendons  Swelling of arm tendons    Swelling of arm tendons    Metformin      Other reaction(s): Gastrointestinal, GI intolerance  Other reaction(s): Gastrointestinal  GI intolerance    Norfloxacin Other (See Comments)     Other reaction(s): Other (see comments)  Severe headache  Severe headache    Severe headache    Oxycodone Other (See Comments)     Other reaction(s): Other (see comments)  \"Feels Weird\"  \"Feels Weird\"    \"Feels Weird\"    Penicillins Rash     blotchy, hives  blotchy, hives          Physical Examination Review of Systems   Vitals: /70 (BP Location: Left arm, Patient Position: Sitting, Cuff Size: Adult Large)   Pulse 100   Resp 16   Wt 113.4 kg (250 lb)   SpO2 98%   BMI= There is no height or weight on file to calculate BMI.  Wt Readings from Last 3 Encounters:   04/24/24 113.4 kg (250 lb)   10/18/22 108.4 kg (239 lb)   10/17/22 108.8 kg (239 lb 12.8 oz)       General: pleasant male. No acute distress.   Neck: No JVD  Lungs: clear to auscultation  COR: borderline fast rate, irregularly irregular rhythm, No murmurs, rubs, or gallops  Extrem: No edema        Please refer above for cardiac ROS details.       Past History     Family History:   Family History   Problem Relation Age of Onset    Bronchitis Mother     Other - See Comments Mother         blood clot - emboli to brain    Cerebrovascular Disease Mother     Tremor Mother     Lung Cancer Father     Brain Cancer Sister     Tremor Sister     Other - See Comments Sister         [facility]    Hypertension Brother     Other - See Comments Brother         " [place]    Tremor Brother     Arthritis Brother     Other - See Comments Brother         [place]roll [place]    Tremor Brother     Other - See Comments Brother         [place]    Other - See Comments Other         step daughter in [place]    Other - See Comments Grandson         great grand daughter    Other - See Comments Granddaughter         great grand son       Social History:   Social History     Socioeconomic History    Marital status:      Spouse name: Not on file    Number of children: Not on file    Years of education: Not on file    Highest education level: Not on file   Occupational History    Not on file   Tobacco Use    Smoking status: Former    Smokeless tobacco: Never    Tobacco comments:     former smoker 2014   Substance and Sexual Activity    Alcohol use: Not on file    Drug use: Not Currently    Sexual activity: Not on file   Other Topics Concern    Not on file   Social History Narrative    Medical history is significant for essential hypertension, bipolar disorder type 1, abnormal involuntary movement, acquired trigger finger, asthma, carpal tunnel syndrome, degenerative joint disease, diabetes mellitus type 2, familial tremor, history of atrial fibrillation, history of hepatitis C, hyperlipidemia, lower urinary tract symptoms, migraine headache, obesity, obstructive sleep apnea, osteoarthritis, polysubstance dependence, recurrent major depressive episodes, lobectomy of lung, and tubular adenoma of colon. He sustained a cerebrovascular accident due to stenosis of left middle cerebral artery in 2019. He denied history of concussion, traumatic brain injury, and seizure. He reported remote history of migraines. 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 Family medical and psychiatric history is significant for tremor (mother, 2 brothers), brain tumor (sister), and alcohol use disorder (maternal grandfather, paternal uncles).      Social Determinants of Health     Financial  Resource Strain: Not At Risk (6/18/2023)    Received from [facility]    Financial Resource Strain     Is it hard for you to pay for the very basics like food, housing, medical care or heating?: No   Food Insecurity: Not At Risk (6/18/2023)    Received from [facility]    Food Insecurity     Does your food run out before you have the money to buy more?: No   Transportation Needs: Not At Risk (6/18/2023)    Received from [facility]    Transportation Needs     Does a lack of transportation keep you from your medical appointments or from getting your medications?: No   Physical Activity: Insufficiently Active (2/28/2022)    Received from [facility]    Exercise Vital Sign     Days of Exercise per Week: 1 day     Minutes of Exercise per Session: 20 min   Stress: No Stress Concern Present (2/28/2022)    Received from [facility]    [employer] - Occupational Stress Questionnaire     Feeling of Stress : Not at all   Social Connections: Moderately Integrated (2/28/2022)    Received from [facility]    Social Connection and Isolation Panel [NHANES]     Frequency of Communication with Friends and Family: Three times a week     Frequency of Social Gatherings with Friends and Family: Three times a week     Attends Baptism Services: Never     Active Member of Clubs or Organizations: Yes     Attends Club or Organization Meetings: More than 4 times per year     Marital Status:    Interpersonal Safety: Not At Risk (2/28/2022)    Received from [facility]    Humiliation, Afraid, Rape, and Kick questionnaire     Fear of Current or Ex-Partner: No     Emotionally Abused: No     Physically Abused: No     Sexually Abused: No   Housing Stability: Low Risk  (2/28/2022)    Received from [facility]    Housing Stability Vital Sign     Unable to Pay for Housing in the Last Year: No     In the last 12 months, how many places have you lived?: 1     In the last 12 months, was there a time when you  "did not have a steady place to sleep or slept in a shelter (including now)?: No            Lab Results    Chemistry/lipid CBC Cardiac Enzymes/BNP/TSH/INR   Lab Results   Component Value Date    TRIG 63 03/31/2022    No results found for: \"WBC\", \"HGB\", \"HCT\", \"MCV\", \"PLT\" Lab Results   Component Value Date    INR 1.2 (H) 05/02/2022            "

## 2024-04-24 NOTE — PATIENT INSTRUCTIONS
It was a pleasure to meet with you today.      Below is a summary of your visit.   Your symptoms and coronary calcium score are concerning for obstructive coronary artery disease.   Start taking metoprolol succinate 25 mg once daily. This will reduce your heart rate and possibly improve your tolerance to activity.  Someone will call you to schedule a heart catheterization procedure and an echocardiogram. These will occur at [facility] in [place], [place].  Follow up with me in about 6-8 weeks.    We will call you to inform you of your test or procedure results within 3 business days of the test being performed.  If you do not hear from our office with the test results within 1 week please do not hesitate to call asking for these results.     Please do not hesitate to call the [facility] with any questions or concerns at [phone]. You can also reach my nurse, Marci, at [phone].    Sincerely,

## 2024-04-24 NOTE — LETTER
4/24/2024    Kamala Metz MD  [facility] [address] [place] [id number]    RE: Truman Reynolds       Dear Colleague,     I had the pleasure of seeing Truman Reynolds in the [facility].    [facility]   [address]  [place] [address]   [email or web address]   OFFICE: [phone]     CARDIOLOGY CLINIC NOTE     Thank you, Kamala North, for asking the [facility] team to see Mr. Truman Reynolds to evaluate New Patient         Assessment/Recommendations   Assessment:    Coronary artery disease with a severely elevated coronary calcium score of >1000 - he has dyspnea and chest discomfort with exertion that is consistent with angina. He has a very high pretest probability of having obstructive disease so will recommend proceeding directly to coronary angiogram.  Persistent atrial fibrillation - currently in atrial fibrillation with borderline controlled rate. CHADS2-vasc score of at least 6 (age, HTN, CAD, DMII, CVA). On rivaroxaban for stroke prevention.  Hypertension - controlled.  Dyslipidemia - on atorvastatin 40 mg    Plan:  Start metoprolol succinate 25 mg daily  Continue other medications without changes  Refer for coronary angiogram with possible PCI and echocardiogram to assess symptoms and degree of obstructive coronary artery disease. Discussed with the patient the risks and benefits of left heart catheterization with coronary angiogram including possible PCI. The risks include but are not limited to death, myocardial infarction, stroke, kidney dysfunction, vessel trauma, hemorrhage, need for emergency corrective surgery, allergy, and dysrhythmia.  Discussed heart-healthy diet  Follow up in 2 months.         History of Present Illness   Mr. Truman Reynolds is a 68 year old male with a significant past history of PAF, HTN, bipolar type I, DMII, SANGITA, CVA, R lower lobe pneumonectomy due to organizing  pneumonia, who presents for evaluation of coronary artery disease.    Mr. Reynolds was recently evaluated with a CT cardiac calcium score of >1000. He has several CAD risk factors including age, sex, obesity, DMII, prior tobacco use. He has noticed an increase in VASQUEZ and chest aching with physical exertion that is relieved with rest over the past 6 months. He has symptoms with walking <1/2 mile. Previously he could walk a couple miles without stopping.       Other than noted above, Mr. Reynolds denies any chest pain/pressure/tightness, shortness of breath at rest or with exertion, light headedness/dizziness, pre-syncope, syncope, lower extremity swelling, palpitations, paroxysmal nocturnal dyspnea (PND), or orthopnea.     Cardiac Problems and Cardiac Diagnostics     Most Recent Cardiac testing:  ECG dated 8/15/14 (personaly reviewed and interpreted): sinus bradycardia, otherwise normal.    CT cardiac calcium score 4/19/24  Summary    1. The patient's total coronary artery calcium score is 1238 using the Agatston scoring method. The patient is above the 90th percentile for age and gender.            Medications  Allergies   Current Outpatient Medications   Medication Sig Dispense Refill    albuterol (PROVENTIL) (2.5 MG/3ML) 0.083% neb solution 2.5 mg by Nebulization route every 6 hours as needed for Wheezing.      amitriptyline (ELAVIL) 100 MG tablet Take 2 tablets by mouth daily      blood glucose (ONETOUCH ULTRA) test strip TESTING FOUR TIMES DAILY (BEFORE BREAKFAST, BEFORE LUNCH, BEFORE SUPPER AND 2 HOURS AFTER SUPPER)      ciclopirox (PENLAC) 8 % external solution APPLY SOLUTION TOPICALLY EVERY EVENING      dulaglutide (TRULICITY) 1.5 MG/0.5ML pen Inject 1.5 mg Subcutaneous every 7 days      EPINEPHrine (ANY BX GENERIC EQUIV) 0.3 MG/0.3ML injection 2-pack Inject 0.3 mg into the muscle as needed      guaiFENesin-codeine (ROBITUSSIN AC) 100-10 MG/5ML solution TAKE 5 TO 10 ML BY MOUTH EVERY 4 HOURS AS NEEDED       insulin lispro (HUMALOG KWIKPEN) 100 UNIT/ML (1 unit dial) KWIKPEN Inject 5 Units subcutaneously three times daily before meals. Plus correction scale, Target 150, Sensitivity 50, TDD= 25 units      ipratropium (ATROVENT) 0.06 % nasal spray Place 2 Sprays into both nostrils 4 times daily as needed for Allergies.      lamoTRIgine (LAMICTAL) 100 MG tablet Take 0.5 tablets by mouth daily      Lancets (ONETOUCH DELICA PLUS [id number]) MISC       loratadine (CLARITIN) 10 MG tablet Take 10 mg by mouth daily as needed      olmesartan (BENICAR) 40 MG tablet Take 40 mg by mouth daily      olopatadine (PATADAY) 0.2 % ophthalmic solution Place 1 Drop into both eyes daily as needed      polyethylene glycol (MIRALAX) 17 GM/Dose powder Take 17 g by mouth daily as needed      primidone (MYSOLINE) 50 MG tablet TAKE 5 TABLETS BY MOUTH TWICE DAILY 300 tablet 3    ramelteon (ROZEREM) 8 MG tablet Take 8 mg by mouth at bedtime      atorvastatin (LIPITOR) 40 MG tablet Take 1 tablet by mouth daily (Patient not taking: Reported on 4/24/2024)      insulin glargine (LANTUS SOLOSTAR) 100 UNIT/ML pen Inject 21 Units Subcutaneous At Bedtime      lisinopril (ZESTRIL) 40 MG tablet Take 40 mg by mouth daily      primidone (MYSOLINE) 250 MG tablet Take 1 tablet (250 mg) by mouth 2 times daily (Patient not taking: Reported on 4/24/2024) 180 tablet 3    rivaroxaban ANTICOAGULANT (XARELTO) 20 MG TABS tablet Take 20 mg by mouth daily        Allergies   Allergen Reactions    Bee Venom Anaphylaxis, Swelling and Hives    Chicken-Derived Products (Egg)      Other reaction(s): Gastrointestinal, Other (see comments)  Can eat egg as ingredient, but egg by itself creates bad GI upset  Can eat egg as ingredient, but egg by itself creates bad GI upset    Other reaction(s): Gastrointestinal  Can eat egg as ingredient, but egg by itself creates bad GI upset  Other reaction(s): Gastrointestinal, Other (see comments)  Can eat egg as ingredient, but egg by itself  "creates bad GI upset  Can eat egg as ingredient, but egg by itself creates bad GI upset    Egg White (Egg Protein) Nausea    Sinemet [Carbidopa W-Levodopa]      Tremor increased    Aspirin Other (See Comments) and Nausea     Other reaction(s): GI intolerance  tolerates 81 mg EC tablets  GI upset, tolerates 81 mg EC tablets    GI upset, tolerates 81 mg EC tablets    Empagliflozin Other (See Comments)     Other reaction(s): Other (see comments)  UTI, Yeast infection  UTI, Yeast infection    UTI, Yeast infection    Levofloxacin Other (See Comments)     Other reaction(s): Other (see comments)  Swelling of arm tendons  Swelling of arm tendons    Swelling of arm tendons    Metformin      Other reaction(s): Gastrointestinal, GI intolerance  Other reaction(s): Gastrointestinal  GI intolerance    Norfloxacin Other (See Comments)     Other reaction(s): Other (see comments)  Severe headache  Severe headache    Severe headache    Oxycodone Other (See Comments)     Other reaction(s): Other (see comments)  \"Feels Weird\"  \"Feels Weird\"    \"Feels Weird\"    Penicillins Rash     blotchy, hives  blotchy, hives          Physical Examination Review of Systems   Vitals: /70 (BP Location: Left arm, Patient Position: Sitting, Cuff Size: Adult Large)   Pulse 100   Resp 16   Wt 113.4 kg (250 lb)   SpO2 98%   BMI= There is no height or weight on file to calculate BMI.  Wt Readings from Last 3 Encounters:   04/24/24 113.4 kg (250 lb)   10/18/22 108.4 kg (239 lb)   10/17/22 108.8 kg (239 lb 12.8 oz)       General: pleasant male. No acute distress.   Neck: No JVD  Lungs: clear to auscultation  COR: borderline fast rate, irregularly irregular rhythm, No murmurs, rubs, or gallops  Extrem: No edema        Please refer above for cardiac ROS details.       Past History     Family History:   Family History   Problem Relation Age of Onset    Bronchitis Mother     Other - See Comments Mother         blood clot - emboli to brain    " Cerebrovascular Disease Mother     Tremor Mother     Lung Cancer Father     Brain Cancer Sister     Tremor Sister     Other - See Comments Sister         [place]    Hypertension Brother     Other - See Comments Brother         [place]    Tremor Brother     Arthritis Brother     Other - See Comments Brother         [place]    Tremor Brother     Other - See Comments Brother         [place]    Other - See Comments Other         step daughter in [place]    Other - See Comments Grandson         great grand daughter    Other - See Comments Granddaughter         great grand son       Social History:   Social History     Socioeconomic History    Marital status:      Spouse name: Not on file    Number of children: Not on file    Years of education: Not on file    Highest education level: Not on file   Occupational History    Not on file   Tobacco Use    Smoking status: Former    Smokeless tobacco: Never    Tobacco comments:     former smoker 2014   Substance and Sexual Activity    Alcohol use: Not on file    Drug use: Not Currently    Sexual activity: Not on file   Other Topics Concern    Not on file   Social History Narrative    Medical history is significant for essential hypertension, bipolar disorder type 1, abnormal involuntary movement, acquired trigger finger, asthma, carpal tunnel syndrome, degenerative joint disease, diabetes mellitus type 2, familial tremor, history of atrial fibrillation, history of hepatitis C, hyperlipidemia, lower urinary tract symptoms, migraine headache, obesity, obstructive sleep apnea, osteoarthritis, polysubstance dependence, recurrent major depressive episodes, lobectomy of lung, and tubular adenoma of colon. He sustained a cerebrovascular accident due to stenosis of left middle cerebral artery in 2019. He denied history of concussion, traumatic brain injury, and seizure. He reported remote history of migraines. 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 Family medical and psychiatric history is significant for tremor (mother, 2 brothers), brain tumor (sister), and alcohol use disorder (maternal grandfather, paternal uncles).      Social Determinants of Health     Financial Resource Strain: Not At Risk (6/18/2023)    [facility]    Financial Resource Strain     Is it hard for you to pay for the very basics like food, housing, medical care or heating?: No   Food Insecurity: Not At Risk (6/18/2023)    [facility]    Food Insecurity     Does your food run out before you have the money to buy more?: No   Transportation Needs: Not At Risk (6/18/2023)    [facility]    Transportation Needs     Does a lack of transportation keep you from your medical appointments or from getting your medications?: No   Physical Activity: Insufficiently Active (2/28/2022)    Received from [facility]    Exercise Vital Sign     Days of Exercise per Week: 1 day     Minutes of Exercise per Session: 20 min   Stress: No Stress Concern Present (2/28/2022)    Received from [facility]    [employer] [employer] - Occupational Stress Questionnaire     Feeling of Stress : Not at all   Social Connections: Moderately Integrated (2/28/2022)    Received from [facility]    Social Connection and Isolation Panel [NHANES]     Frequency of Communication with Friends and Family: Three times a week     Frequency of Social Gatherings with Friends and Family: Three times a week     Attends Mandaen Services: Never     Active Member of Clubs or Organizations: Yes     Attends Club or Organization Meetings: More than 4 times per year     Marital Status:    Interpersonal Safety: Not At Risk (2/28/2022)    Received from [facility]    Humiliation, Afraid, Rape, and Kick questionnaire     Fear of Current or Ex-Partner: No     Emotionally Abused: No     Physically Abused: No     Sexually Abused: No   Housing Stability: Low Risk   "(2/28/2022)    Received from [facility]    Housing Stability Vital Sign     Unable to Pay for Housing in the Last Year: No     In the last 12 months, how many places have you lived?: 1     In the last 12 months, was there a time when you did not have a steady place to sleep or slept in a shelter (including now)?: No            Lab Results    Chemistry/lipid CBC Cardiac Enzymes/BNP/TSH/INR   Lab Results   Component Value Date    TRIG 63 03/31/2022    No results found for: \"WBC\", \"HGB\", \"HCT\", \"MCV\", \"PLT\" Lab Results   Component Value Date    INR 1.2 (H) 05/02/2022                Thank you for allowing me to participate in the care of your patient.      Sincerely,     Tai Fajardo MD     [facility]  cc:   Kamala Metz MD  [facility]  [address] [place] [address]  "

## 2024-04-26 ENCOUNTER — PREP FOR PROCEDURE (OUTPATIENT)
Dept: CARDIOLOGY | Facility: CLINIC | Age: 69
End: 2024-04-26
Payer: COMMERCIAL

## 2024-04-26 DIAGNOSIS — I48.19 PERSISTENT ATRIAL FIBRILLATION (H): ICD-10-CM

## 2024-04-26 DIAGNOSIS — R93.1 AGATSTON CORONARY ARTERY CALCIUM SCORE GREATER THAN 400: ICD-10-CM

## 2024-04-26 DIAGNOSIS — I10 BENIGN ESSENTIAL HYPERTENSION: ICD-10-CM

## 2024-04-26 DIAGNOSIS — I25.118 CORONARY ARTERY DISEASE OF NATIVE ARTERY OF NATIVE HEART WITH STABLE ANGINA PECTORIS (H): Primary | ICD-10-CM

## 2024-04-26 RX ORDER — LIDOCAINE 40 MG/G
CREAM TOPICAL
Status: CANCELLED | OUTPATIENT
Start: 2024-04-26

## 2024-04-26 RX ORDER — DEXTROSE MONOHYDRATE 25 G/50ML
25-50 INJECTION, SOLUTION INTRAVENOUS
Status: CANCELLED | OUTPATIENT
Start: 2024-05-02

## 2024-04-26 RX ORDER — FENTANYL CITRATE 50 UG/ML
25 INJECTION, SOLUTION INTRAMUSCULAR; INTRAVENOUS
Status: CANCELLED | OUTPATIENT
Start: 2024-05-02

## 2024-04-26 RX ORDER — SODIUM CHLORIDE 9 MG/ML
INJECTION, SOLUTION INTRAVENOUS CONTINUOUS
Status: CANCELLED | OUTPATIENT
Start: 2024-05-02

## 2024-04-26 RX ORDER — NICOTINE POLACRILEX 4 MG
15-30 LOZENGE BUCCAL
Status: CANCELLED | OUTPATIENT
Start: 2024-05-02

## 2024-04-29 ENCOUNTER — TELEPHONE (OUTPATIENT)
Dept: CARDIOLOGY | Facility: CLINIC | Age: 69
End: 2024-04-29

## 2024-04-29 LAB
ABO/RH(D): NORMAL
ANTIBODY SCREEN: NEGATIVE
SPECIMEN EXPIRATION DATE: NORMAL

## 2024-04-29 RX ORDER — ASPIRIN 325 MG
325 TABLET ORAL ONCE
Status: SHIPPED | OUTPATIENT
Start: 2024-04-30

## 2024-04-30 ENCOUNTER — LAB (OUTPATIENT)
Dept: CARDIOLOGY | Facility: CLINIC | Age: 69
End: 2024-04-30
Payer: COMMERCIAL

## 2024-04-30 DIAGNOSIS — I25.118 CORONARY ARTERY DISEASE OF NATIVE ARTERY OF NATIVE HEART WITH STABLE ANGINA PECTORIS (H): ICD-10-CM

## 2024-04-30 DIAGNOSIS — Z01.812 PRE-PROCEDURE LAB EXAM: ICD-10-CM

## 2024-04-30 LAB
ANION GAP SERPL CALCULATED.3IONS-SCNC: 10 MMOL/L (ref 7–15)
BUN SERPL-MCNC: 9.7 MG/DL (ref 8–23)
CALCIUM SERPL-MCNC: 9.4 MG/DL (ref 8.8–10.2)
CHLORIDE SERPL-SCNC: 100 MMOL/L (ref 98–107)
CHOLEST SERPL-MCNC: 127 MG/DL
CREAT SERPL-MCNC: 0.94 MG/DL (ref 0.67–1.17)
DEPRECATED HCO3 PLAS-SCNC: 26 MMOL/L (ref 22–29)
EGFRCR SERPLBLD CKD-EPI 2021: 89 ML/MIN/1.73M2
ERYTHROCYTE [DISTWIDTH] IN BLOOD BY AUTOMATED COUNT: 12.4 % (ref 10–15)
FASTING STATUS PATIENT QL REPORTED: NO
GLUCOSE SERPL-MCNC: 161 MG/DL (ref 70–99)
HCT VFR BLD AUTO: 41.1 % (ref 40–53)
HDLC SERPL-MCNC: 40 MG/DL
HGB BLD-MCNC: 13.8 G/DL (ref 13.3–17.7)
LDLC SERPL CALC-MCNC: 69 MG/DL
MCH RBC QN AUTO: 31.9 PG (ref 26.5–33)
MCHC RBC AUTO-ENTMCNC: 33.6 G/DL (ref 31.5–36.5)
MCV RBC AUTO: 95 FL (ref 78–100)
NONHDLC SERPL-MCNC: 87 MG/DL
PLATELET # BLD AUTO: 241 10E3/UL (ref 150–450)
POTASSIUM SERPL-SCNC: 4.8 MMOL/L (ref 3.4–5.3)
RBC # BLD AUTO: 4.32 10E6/UL (ref 4.4–5.9)
SODIUM SERPL-SCNC: 136 MMOL/L (ref 135–145)
TRIGL SERPL-MCNC: 89 MG/DL
WBC # BLD AUTO: 5.4 10E3/UL (ref 4–11)

## 2024-04-30 PROCEDURE — 80048 BASIC METABOLIC PNL TOTAL CA: CPT

## 2024-04-30 PROCEDURE — 85027 COMPLETE CBC AUTOMATED: CPT

## 2024-04-30 PROCEDURE — 36415 COLL VENOUS BLD VENIPUNCTURE: CPT

## 2024-04-30 PROCEDURE — 86850 RBC ANTIBODY SCREEN: CPT

## 2024-04-30 PROCEDURE — 86900 BLOOD TYPING SEROLOGIC ABO: CPT

## 2024-04-30 PROCEDURE — 80061 LIPID PANEL: CPT

## 2024-04-30 PROCEDURE — 86901 BLOOD TYPING SEROLOGIC RH(D): CPT

## 2024-05-02 ENCOUNTER — HOSPITAL ENCOUNTER (OUTPATIENT)
Dept: CARDIOLOGY | Facility: HOSPITAL | Age: 69
Discharge: HOME | End: 2024-05-02
Attending: INTERNAL MEDICINE | Admitting: INTERNAL MEDICINE
Payer: MEDICARE

## 2024-05-02 ENCOUNTER — HOSPITAL ENCOUNTER (OUTPATIENT)
Facility: HOSPITAL | Age: 69
Discharge: HOME | End: 2024-05-02
Attending: INTERNAL MEDICINE | Admitting: INTERNAL MEDICINE
Payer: MEDICARE

## 2024-05-02 VITALS
HEART RATE: 84 BPM | BODY MASS INDEX: 31.83 KG/M2 | TEMPERATURE: 97.9 F | HEIGHT: 74 IN | WEIGHT: 248 LBS | SYSTOLIC BLOOD PRESSURE: 168 MMHG | RESPIRATION RATE: 24 BRPM | OXYGEN SATURATION: 98 % | DIASTOLIC BLOOD PRESSURE: 81 MMHG

## 2024-05-02 DIAGNOSIS — I48.19 PERSISTENT ATRIAL FIBRILLATION (H): ICD-10-CM

## 2024-05-02 DIAGNOSIS — I25.118 CORONARY ARTERY DISEASE OF NATIVE ARTERY OF NATIVE HEART WITH STABLE ANGINA PECTORIS (H): ICD-10-CM

## 2024-05-02 DIAGNOSIS — R93.1 AGATSTON CORONARY ARTERY CALCIUM SCORE GREATER THAN 400: ICD-10-CM

## 2024-05-02 DIAGNOSIS — I10 BENIGN ESSENTIAL HYPERTENSION: ICD-10-CM

## 2024-05-02 LAB
ATRIAL RATE - MUSE: 82 BPM
DIASTOLIC BLOOD PRESSURE - MUSE: NORMAL MMHG
GLUCOSE BLDC GLUCOMTR-MCNC: 181 MG/DL (ref 70–99)
INTERPRETATION ECG - MUSE: NORMAL
LVEF ECHO: NORMAL
P AXIS - MUSE: 64 DEGREES
PR INTERVAL - MUSE: 230 MS
QRS DURATION - MUSE: 120 MS
QT - MUSE: 388 MS
QTC - MUSE: 453 MS
R AXIS - MUSE: 65 DEGREES
SYSTOLIC BLOOD PRESSURE - MUSE: NORMAL MMHG
T AXIS - MUSE: 66 DEGREES
VENTRICULAR RATE- MUSE: 82 BPM

## 2024-05-02 PROCEDURE — 93799 UNLISTED CV SVC/PROCEDURE: CPT | Performed by: INTERNAL MEDICINE

## 2024-05-02 PROCEDURE — C1894 INTRO/SHEATH, NON-LASER: HCPCS | Performed by: INTERNAL MEDICINE

## 2024-05-02 PROCEDURE — 93306 TTE W/DOPPLER COMPLETE: CPT | Mod: 26 | Performed by: STUDENT IN AN ORGANIZED HEALTH CARE EDUCATION/TRAINING PROGRAM

## 2024-05-02 PROCEDURE — 250N000013 HC RX MED GY IP 250 OP 250 PS 637: Performed by: INTERNAL MEDICINE

## 2024-05-02 PROCEDURE — 82962 GLUCOSE BLOOD TEST: CPT

## 2024-05-02 PROCEDURE — 99152 MOD SED SAME PHYS/QHP 5/>YRS: CPT | Performed by: INTERNAL MEDICINE

## 2024-05-02 PROCEDURE — 99152 MOD SED SAME PHYS/QHP 5/>YRS: CPT | Mod: 59 | Performed by: INTERNAL MEDICINE

## 2024-05-02 PROCEDURE — 93454 CORONARY ARTERY ANGIO S&I: CPT | Mod: 26 | Performed by: INTERNAL MEDICINE

## 2024-05-02 PROCEDURE — 93010 ELECTROCARDIOGRAM REPORT: CPT | Performed by: STUDENT IN AN ORGANIZED HEALTH CARE EDUCATION/TRAINING PROGRAM

## 2024-05-02 PROCEDURE — 999N000054 HC STATISTIC EKG NON-CHARGEABLE

## 2024-05-02 PROCEDURE — 250N000011 HC RX IP 250 OP 636: Performed by: INTERNAL MEDICINE

## 2024-05-02 PROCEDURE — 93005 ELECTROCARDIOGRAM TRACING: CPT

## 2024-05-02 PROCEDURE — C1769 GUIDE WIRE: HCPCS | Performed by: INTERNAL MEDICINE

## 2024-05-02 PROCEDURE — 272N000001 HC OR GENERAL SUPPLY STERILE: Performed by: INTERNAL MEDICINE

## 2024-05-02 PROCEDURE — 255N000002 HC RX 255 OP 636: Performed by: INTERNAL MEDICINE

## 2024-05-02 PROCEDURE — 93454 CORONARY ARTERY ANGIO S&I: CPT | Performed by: INTERNAL MEDICINE

## 2024-05-02 PROCEDURE — 93571 IV DOP VEL&/PRESS C FLO 1ST: CPT | Mod: 26 | Performed by: INTERNAL MEDICINE

## 2024-05-02 PROCEDURE — 250N000009 HC RX 250: Performed by: INTERNAL MEDICINE

## 2024-05-02 RX ORDER — ATORVASTATIN CALCIUM 40 MG/1
80 TABLET, FILM COATED ORAL DAILY
Qty: 90 TABLET | Refills: 3 | Status: SHIPPED | OUTPATIENT
Start: 2024-05-02

## 2024-05-02 RX ORDER — DIAZEPAM 5 MG
5 TABLET ORAL
Status: COMPLETED | OUTPATIENT
Start: 2024-05-02 | End: 2024-05-02

## 2024-05-02 RX ORDER — IODIXANOL 320 MG/ML
INJECTION, SOLUTION INTRAVASCULAR
Status: DISCONTINUED | OUTPATIENT
Start: 2024-05-02 | End: 2024-05-02 | Stop reason: HOSPADM

## 2024-05-02 RX ORDER — ACETAMINOPHEN 325 MG/1
650 TABLET ORAL EVERY 4 HOURS PRN
Status: DISCONTINUED | OUTPATIENT
Start: 2024-05-02 | End: 2024-05-02 | Stop reason: HOSPADM

## 2024-05-02 RX ORDER — ASPIRIN 81 MG/1
324 TABLET, CHEWABLE ORAL ONCE
Status: COMPLETED | OUTPATIENT
Start: 2024-05-02 | End: 2024-05-02

## 2024-05-02 RX ORDER — FLUMAZENIL 0.1 MG/ML
0.2 INJECTION, SOLUTION INTRAVENOUS
Status: DISCONTINUED | OUTPATIENT
Start: 2024-05-02 | End: 2024-05-02 | Stop reason: HOSPADM

## 2024-05-02 RX ORDER — ASPIRIN 325 MG
325 TABLET, DELAYED RELEASE (ENTERIC COATED) ORAL ONCE
Status: DISCONTINUED | OUTPATIENT
Start: 2024-05-02 | End: 2024-05-02

## 2024-05-02 RX ORDER — ATROPINE SULFATE 0.1 MG/ML
0.5 INJECTION INTRAVENOUS
Status: DISCONTINUED | OUTPATIENT
Start: 2024-05-02 | End: 2024-05-02 | Stop reason: HOSPADM

## 2024-05-02 RX ORDER — NALOXONE HYDROCHLORIDE 0.4 MG/ML
0.2 INJECTION, SOLUTION INTRAMUSCULAR; INTRAVENOUS; SUBCUTANEOUS
Status: DISCONTINUED | OUTPATIENT
Start: 2024-05-02 | End: 2024-05-02 | Stop reason: HOSPADM

## 2024-05-02 RX ORDER — LIDOCAINE 40 MG/G
CREAM TOPICAL
Status: DISCONTINUED | OUTPATIENT
Start: 2024-05-02 | End: 2024-05-02 | Stop reason: HOSPADM

## 2024-05-02 RX ORDER — NALOXONE HYDROCHLORIDE 0.4 MG/ML
0.4 INJECTION, SOLUTION INTRAMUSCULAR; INTRAVENOUS; SUBCUTANEOUS
Status: DISCONTINUED | OUTPATIENT
Start: 2024-05-02 | End: 2024-05-02 | Stop reason: HOSPADM

## 2024-05-02 RX ORDER — NITROGLYCERIN 0.4 MG/1
TABLET SUBLINGUAL
Qty: 25 TABLET | Refills: 3 | Status: SHIPPED | OUTPATIENT
Start: 2024-05-02 | End: 2024-06-20

## 2024-05-02 RX ORDER — HEPARIN SODIUM 200 [USP'U]/100ML
INJECTION, SOLUTION INTRAVENOUS
Status: DISCONTINUED | OUTPATIENT
Start: 2024-05-02 | End: 2024-05-02 | Stop reason: HOSPADM

## 2024-05-02 RX ORDER — DEXTROSE MONOHYDRATE 25 G/50ML
25-50 INJECTION, SOLUTION INTRAVENOUS
Status: DISCONTINUED | OUTPATIENT
Start: 2024-05-02 | End: 2024-05-02 | Stop reason: HOSPADM

## 2024-05-02 RX ORDER — SODIUM CHLORIDE 9 MG/ML
INJECTION, SOLUTION INTRAVENOUS CONTINUOUS
Status: DISCONTINUED | OUTPATIENT
Start: 2024-05-02 | End: 2024-05-02 | Stop reason: HOSPADM

## 2024-05-02 RX ORDER — NICOTINE POLACRILEX 4 MG
15-30 LOZENGE BUCCAL
Status: DISCONTINUED | OUTPATIENT
Start: 2024-05-02 | End: 2024-05-02 | Stop reason: HOSPADM

## 2024-05-02 RX ORDER — ISOSORBIDE MONONITRATE 30 MG/1
30 TABLET, EXTENDED RELEASE ORAL DAILY
Qty: 30 TABLET | Refills: 2 | Status: SHIPPED | OUTPATIENT
Start: 2024-05-02 | End: 2024-06-20 | Stop reason: SINTOL

## 2024-05-02 RX ORDER — NITROGLYCERIN 5 MG/ML
VIAL (ML) INTRAVENOUS
Status: DISCONTINUED | OUTPATIENT
Start: 2024-05-02 | End: 2024-05-02 | Stop reason: HOSPADM

## 2024-05-02 RX ORDER — FENTANYL CITRATE 50 UG/ML
25 INJECTION, SOLUTION INTRAMUSCULAR; INTRAVENOUS
Status: DISCONTINUED | OUTPATIENT
Start: 2024-05-02 | End: 2024-05-02 | Stop reason: HOSPADM

## 2024-05-02 RX ORDER — FENTANYL CITRATE 50 UG/ML
INJECTION, SOLUTION INTRAMUSCULAR; INTRAVENOUS
Status: DISCONTINUED | OUTPATIENT
Start: 2024-05-02 | End: 2024-05-02 | Stop reason: HOSPADM

## 2024-05-02 RX ORDER — HEPARIN SODIUM 1000 [USP'U]/ML
INJECTION, SOLUTION INTRAVENOUS; SUBCUTANEOUS
Status: DISCONTINUED | OUTPATIENT
Start: 2024-05-02 | End: 2024-05-02 | Stop reason: HOSPADM

## 2024-05-02 RX ADMIN — DIAZEPAM 5 MG: 5 TABLET ORAL at 09:38

## 2024-05-02 RX ADMIN — ASPIRIN 325 MG ORAL TABLET 324 MG: 325 PILL ORAL at 09:38

## 2024-05-02 RX ADMIN — PERFLUTREN 2 ML: 6.52 INJECTION, SUSPENSION INTRAVENOUS at 09:19

## 2024-05-02 ASSESSMENT — ACTIVITIES OF DAILY LIVING (ADL)
ADLS_ACUITY_SCORE: 35

## 2024-05-02 NOTE — INTERVAL H&P NOTE
"I have reviewed the surgical (or preoperative) H&P that is linked to this encounter, and examined the patient. There are no significant changes    Labs ok from 4/30/24    Clinical Conditions Present on Arrival:  Clinically Significant Risk Factors Present on Admission     # Drug Induced Coagulation Defect: home medication list includes an anticoagulant medication   # Obesity: Estimated body mass index is 31.84 kg/m  as calculated from the following:    Height as of this encounter: 1.88 m (6' 2\").    Weight as of this encounter: 112.5 kg (248 lb).           "

## 2024-05-02 NOTE — PROGRESS NOTES
Discharged to home following radial coronary angiogram. No intervention done. Right wrist site intact. Pt given post care instructions. Has no concerns.    
Principal Discharge DX:	Worried well  Goal:	No resp distress/cp or emesis/abd p  Assessment and plan of treatment:	F/u with pmd in 2 days, return precautions explained

## 2024-05-02 NOTE — PRE-PROCEDURE
GENERAL PRE-PROCEDURE:   Procedure:  Cor angio with possible intervention  Date/Time:  5/2/2024 8:22 AM    Risks and benefits: Risks, benefits and alternatives were discussed    Patient states understanding of procedure being performed: Yes    Patient's understanding of procedure matches consent: Yes    Procedure consent matches procedure scheduled: Yes    Appropriately NPO:  Yes  ASA Class:  3 (VASQUEZ/CP with elevated Ca score, permanent Afib, HTN, Dyslipidemia, DMII, bipolar, hx CVA, SANGITA)  Mallampati  :  Grade 2- soft palate, base of uvula, tonsillar pillars, and portion of posterior pharyngeal wall visible  Lungs:  Lungs clear with good breath sounds bilaterally  Heart:  Normal heart sounds and rate  History & Physical reviewed:  History and physical reviewed and updates made (see comment)  H&P Comments:  Clinically Significant Risk Factors Present on Admission    Cardiovascular : elevated Ca++ score, HTN, Dyslipidemia, perm Afib    Fluid & Electrolyte Disorders : Not present on admission    Gastroenterology : Not present on admission    Hematology/Oncology : Not present on admission    Nephrology : Not present on admission    Neurology : Hx of CVA    Pulmonology : SANGITA    Systemic : DMII, obesity    [unfilled]    Statement of review:  I have reviewed the lab findings, diagnostic data, medications, and the plan for sedation

## 2024-05-02 NOTE — DISCHARGE INSTRUCTIONS

## 2024-05-12 ENCOUNTER — HEALTH MAINTENANCE LETTER (OUTPATIENT)
Age: 69
End: 2024-05-12

## 2024-05-23 NOTE — PROGRESS NOTES
Department of Neurology  Movement Disorders Division     Patient: Truman Reynolds   MRN: [id number]   : 1955   Date of Visit:  2024     Dear Colleague,     Thank you for referring your patient, Mr. Reynolds, to the [facility] at [facility]. Please see a copy of my visit note below.    Referring Provider: Zurdo Booker MD   Transferring care from Dr. Barrett as  is a better location for patient    Impression:  Truman Reynolds is a 68 year old male with mixed tremor in setting of Essential Tremor. He reports good control of tremor with primidone and is able to do most of the daily things that he wants to do. Exam shows mostly action tremor with subtle signs of parkinsonism (right sided symptoms of bradykinesia, rigidity, slight resting tremor). This was discussed with Truman. As Essential Tremor is the most bothersome symptom for Truman will adjust primidone and continue to monitor exam.       Mixed Tremor, Essential Tremor: He was seen at [facility], Dr. Freeman, in 3/2022 who recommended weaning off primidone and trialing carbidopa/levodopa. Patient weaned off of primidone and over a month carbidopa/levodopa was titrated up to 2.5 tabs three times daily and patient reported worsening tremor thus carbidopa/levodopa stopped and primidone restarted.  Parkinsonism, subtle on exam  Medication induced tremor: Previously treated with Lithium for 30+ years for depression with improvement of tremor once this medication was discontinued.   Insomnia   DM peripheral neuropathy     Plan:   - Transition to primidone 250 mg twice daily and primidone 1 tab at bedtime. Can increase the dose at night to 100 mg by increasing by 0.5 tab weekly until you reach 100 mg or lowest effective dose.   Movement Disorder-related Medications                   Indication AM lunch dinner     Primidone 250 mg ET 1 1      Primidone 50 mg  ET   1-2     - Neuropsychology referral to  "repeat Neurospcyhometric evaluation  - Will continue to observe symptoms of parkinsonism     Patient to return in 6 months, for in-person visit, 30 minutes.     Vero Leon,   Movement Disorders Specialist  [facility]      Note dictated using voice recognition software.  50 minutes spent on date of encounter doing chart reviews and exam and documentation and further activities as noted above.      Thank you for your referral to our [facility] Movement Disorders Program, we appreciate the opportunity of being your partner in healthcare. Please feel free to reach out to us at any point if any questions or concerns about this visit.        ------------------------------------------------------------------------------------------------------------      History of Present Illness  Mr. Reynolds is a pleasant 68 year old R handed male with PMH of HTN, HDL, pAfib, DM2, depression, bipolar disorder, insomnia that presents to Neurology Movement clinic as a new patient for management of Parkinson Disease. He previously followed with Dr. Barrett and currently follows with Dr. Sabra Way, PharmD.    History obtained from patient.   His main complaint is tremor, R > L hand. He would like to increase dose of primidone. He denies side effect on current dose of primidone. He reports good tremor control with primidone.   Patient reports a traveling numb spot in back.  Stiffness in legs, started 8 months ago. Walks 2 miles every day and stretches. This helps the stiffness and when he walks, \"my legs feel normal\". He notes numbness in the balls of his feet.   Initial symptom/side: Tremors in hands, R > L, began in 30s year old. Patient was diagnosed with ET around 2005 by a neurologist. He has a hard time turning a screw  but this is manageable with primidone.   Disease progression: Tremors in hand have progressed and worsened in his 50s year old.   He underwent Deep Brain Stimulation workup at the  in " "11/2022 and the consensus was a left GPi Deep Brain Stimulation 'wait and see' approach to treat his right body symptoms utilizing [place] [employer]us R16 with two extension wires. 'As Truman met criteria for MCI and he was on anticoagulation for a small prior stroke, it was felt that the 'wait and see' option would be best to ensure that his first side goes well without complication before deciding on moving ahead with his second side.   Reason Deep Brain Stimulation was NOT pursued: Due to concern possible Parkinson Disease.and also symptoms seemed controlled on primidone thus patient decided not to pursue with Deep Brain Stimulation.     Current treatment for tremors:   Movement Disorder-related Medications                   Indication AM lunch dinner              Primidone 50 mg ET 5 4 1                           Previous treatment for tremors:   - Carbidopa/levodopa: \"Made me ill and it also made me turn ana yellow looking and my tremors were WAY worse.\"     Exacerbating factors: Uncertain, \"maybe when I'm working with my hands for fine motor skills.\"   Relieving factors: exercise and keeping on moving and primidone    Does alcohol relieve symptoms: doesn't drink alcohol   Caffeine intake: Drink decaf, doesn't affect tremor but regular coffee did worsen tremor.  Change in handwriting: Yes, only prints not which is legible. Cursive is hard to read.    Resting tremor: denies.  Bradykinesia: denies  Rigidity: in legs, calves  Gait problem: No change in gait.   Balance/Falls: denies falls or balance issues  Numbness/Tingling: see above  Dystonia: deneis  Pain: denies     Memory problems: Seen by Dr Doyle in 10/2022. He states that he is \"good\".  Mood issues: Follows with Psychiatry, Dr. Sanchez and is treated with lamotrigine. Had been treated on Lithium for 30 years for management of depression. Since stopping lithium tremors have vastly improved, per patient. He had been suicidal and has not been hospitalized for " "mood issues. At age 16 years - wire attempt to hang himself. Youngest sister \"susan\" has had depressed. Per chart review, his previous mood medications are: fluoxetine, Abilify, Effexor, Seroquel, Wellbutrin ,Clonazepam, Adderall, Lamictal, trazodone, Doxepin, Nefazodone, Valium, Hydroxyzine, Depakote   History of dopamine depleting therapies: see above  Sleep issues/Dream enactment: No issues.  Sense of smell: no issues  Constipation: daily BMs  Speech:  no issues  Facial expression:  no issues  Swallowing: no issues  Autonomic dysfunction: May get dizzy when he stands to quickly, this is intermittent. Drinks over a half gallon of water a day.   Family Hx of tremor: Yes, mother and 2 brother.  He has had substance problems in the past, quit age 36 yrs. Sober of alcohol, and other substances for years. Shot heroin in [place]. Has done psychedelic medication. Had a dwi long time ago.    TETRAS ADL Subscale (48 max)  (0=Normal 1=Slightly abnormal 2=Mildly 3=Moderately 4= Severely)    Tremor ADL Scale  1. Speaking 0   2. Feeding with a spoon 3 (He usese a bigger spoon and holds utensils differntly to improve hold. Drinks soup out of a mug.)   3. Drinking from a glass 0   4. Hygiene 0   5. Dressing 0   6. Pouring 3   7. Carrying food trays, plates or similar items 0   8. Using keys 0   9. Writing 1 (prints mostly)   10. Working 0   11. Overall disability with the most affected task 1   Name of most affected task writing   12. Social impact 0   ADL TOTAL 8       Review of Systems:  Other than that mentioned above, the remainder of 12 systems reviewed were negative.    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      amitriptyline (ELAVIL) 100 MG tablet Take 2 tablets by mouth daily      atorvastatin (LIPITOR) 40 MG tablet Take 2 tablets (80 mg) by mouth daily 90 tablet 3    blood glucose (ONETOUCH ULTRA) test strip TESTING FOUR TIMES DAILY (BEFORE BREAKFAST, BEFORE LUNCH, BEFORE SUPPER AND 2 HOURS AFTER SUPPER)      ciclopirox (PENLAC) 8 % external solution APPLY SOLUTION TOPICALLY EVERY EVENING      dulaglutide (TRULICITY) 1.5 MG/0.5ML pen Inject 1.5 mg Subcutaneous every 7 days      EPINEPHrine (ANY BX GENERIC EQUIV) 0.3 MG/0.3ML injection 2-pack Inject 0.3 mg into the muscle as needed      guaiFENesin-codeine (ROBITUSSIN AC) 100-10 MG/5ML solution TAKE 5 TO 10 ML BY MOUTH EVERY 4 HOURS AS NEEDED      insulin lispro (HUMALOG KWIKPEN) 100 UNIT/ML (1 unit dial) KWIKPEN Inject 5 Units subcutaneously three times daily before meals. Plus correction scale, Target 150, Sensitivity 50, TDD= 25 units      ipratropium (ATROVENT) 0.06 % nasal spray Place 2 Sprays into both nostrils 4 times daily as needed for Allergies.      lamoTRIgine (LAMICTAL) 100 MG tablet Take 0.5 tablets by mouth daily      Lancets (ONETOUCH DELICA PLUS [id number]) MISC       loratadine (CLARITIN) 10 MG tablet Take 10 mg by mouth daily as needed      metoprolol succinate ER (TOPROL XL) 25 MG 24 hr tablet Take 1 tablet (25 mg) by mouth daily 30 tablet 11    nitroGLYcerin (NITROSTAT) 0.4 MG sublingual tablet One tablet under the tongue every 5 minutes if needed for chest pain. May repeat every 5 minutes for a maximum of 3 doses in 15 minutes\" 25 tablet 3    olmesartan (BENICAR) 40 MG " tablet Take 40 mg by mouth daily      olopatadine (PATADAY) 0.2 % ophthalmic solution Place 1 Drop into both eyes daily as needed      polyethylene glycol (MIRALAX) 17 GM/Dose powder Take 17 g by mouth daily as needed      primidone (MYSOLINE) 50 MG tablet TAKE 5 TABLETS BY MOUTH TWICE DAILY 300 tablet 3    ramelteon (ROZEREM) 8 MG tablet Take 8 mg by mouth at bedtime      insulin glargine (LANTUS SOLOSTAR) 100 UNIT/ML pen Inject 21 Units Subcutaneous At Bedtime      isosorbide mononitrate (IMDUR) 30 MG 24 hr tablet Take 1 tablet (30 mg) by mouth daily (Patient not taking: Reported on 5/24/2024) 30 tablet 2    rivaroxaban ANTICOAGULANT (XARELTO) 20 MG TABS tablet Take 20 mg by mouth daily             Allergies   Allergen Reactions    Bee Venom Anaphylaxis, Swelling and Hives    Chicken-Derived Products (Egg)      Other reaction(s): Gastrointestinal, Other (see comments)  Can eat egg as ingredient, but egg by itself creates bad GI upset  Can eat egg as ingredient, but egg by itself creates bad GI upset    Other reaction(s): Gastrointestinal  Can eat egg as ingredient, but egg by itself creates bad GI upset  Other reaction(s): Gastrointestinal, Other (see comments)  Can eat egg as ingredient, but egg by itself creates bad GI upset  Can eat egg as ingredient, but egg by itself creates bad GI upset    Egg White (Egg Protein) Nausea    Sinemet [Carbidopa W-Levodopa]      Tremor increased    Aspirin Other (See Comments) and Nausea     Other reaction(s): GI intolerance  tolerates 81 mg EC tablets  GI upset, tolerates 81 mg EC tablets    GI upset, tolerates 81 mg EC tablets    Empagliflozin Other (See Comments)     Other reaction(s): Other (see comments)  UTI, Yeast infection  UTI, Yeast infection    UTI, Yeast infection    Levofloxacin Other (See Comments)     Other reaction(s): Other (see comments)  Swelling of arm tendons  Swelling of arm tendons    Swelling of arm tendons    Metformin      Other reaction(s):  "Gastrointestinal, GI intolerance  Other reaction(s): Gastrointestinal  GI intolerance    Norfloxacin Other (See Comments)     Other reaction(s): Other (see comments)  Severe headache  Severe headache    Severe headache    Oxycodone Other (See Comments)     Other reaction(s): Other (see comments)  \"Feels Weird\"  \"Feels Weird\"    \"Feels Weird\"    Penicillins Rash     blotchy, hives  blotchy, hives           Physical Exam:  The patient's  blood pressure is 121/77 and his pulse is 83.        5/24/2024     8:00 AM   Tremor Motor Scale   Medication On   DBS - Right Brain None   DBS - Left Brain None   Head 1   Face & Jaw 0   Voice 0   Outstretched - RIGHT 1.5   Outstretched - LEFT 1.5   Wingbeating - RIGHT 1.5   Wingbeating - LEFT 1.5   Kinetic - RIGHT 1.5   Kinetic - LEFT 1.5   Lower Limb - RIGHT 0.5   Lower Limb - LEFT 0.5   Lower Limb (Max) 0.5   Spiral - RIGHT 1.5   Spiral - LEFT 1   Handwriting 1   Dot approx - RIGHT 1.5   Dot approx - LEFT 1.5   Trunk (Standing) 0   Total Right 8   Total Left 7.5   Axial 1   TOTAL 17         5/24/2024     8:00 AM   UPDRS Motor Scale   Medication Off   R Brain DBS: None   L Brain DBS: None   Dyskinesia (LID) No   Speech 0   Facial Expression 0   Rigidity Neck 1   Rigidity RUE 2   Rigidity LUE 1   Rigidity RLE 0   Rigidity LLE 0   Finger Taps R 1   Finger Taps L 0   Hand Mvt R 0   Hand Mvt L 0   Pron-/Supinate R 1   Pron-/Supinate L 0   Toe Tap R 0   Toe Tap L 0   Leg Agility R 0   Leg Agility L 0   Arise From Chair 0   Gait 0   Gait Freezing 0   Postural Stability 0   Posture 0   Global Spont Mvt 0   Postural Tremor RUE 1   Postural Tremor LUE 1   Kinetic Tremor RUE 1   Kinetic Tremor LUE 1   Rest Tremor RUE 1   Rest Tremor LUE 0   Rest Tremor RLE 0   Rest Tremor LLE 0   Rest Tremor Lip/Jaw 0   Rest Tremor Constancy 1   Total Right 7   Total Left 3   Axial Total 1   Total 12     Data Reviewed: I have personally reviewed the tests/studies below.   - Brain mRI 12/11/2020 (Was off " "lithium for 5 days for this exam)  1.  No acute infarct, mass, mass effect, or hemorrhage.   2.  Minimal to mild chronic small vessel ischemia.   3.  Mild atrophy    10/19/2022 MRI brain   Impression:  1. No acute intracranial pathology.  2. 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 This can be seen with Parkinson disease or Lewy Body Dementia. Recommend clinical correlation.  3. Several scattered nonspecific T2 hyperintense foci in cerebral white matter. There are also T2 hyperintense lesions involving the cortex and subcortical white matter in the left precentral and  postcentral gyrus. Differentials are broad and include sequela of prior inflammatory/infectious or vascular insults.    9/2022 DATscan  Impression: A presynaptic dopaminergic deficit is present.       4/2024 \"A1C\": 7.4%  No results found for: \"TSH\"  CBC RESULTS:   Recent Labs   Lab Test 04/30/24  0949   WBC 5.4   RBC 4.32*   HGB 13.8   HCT 41.1   MCV 95   MCH 31.9   MCHC 33.6   RDW 12.4        Last Comprehensive Metabolic Panel:  Sodium   Date Value Ref Range Status   04/30/2024 136 135 - 145 mmol/L Final     Comment:     Reference intervals for this test were updated on 09/26/2023 to more accurately reflect our healthy population. There may be differences in the flagging of prior results with similar values performed with this method. Interpretation of those prior results can be made in the context of the updated reference intervals.      Potassium   Date Value Ref Range Status   04/30/2024 4.8 3.4 - 5.3 mmol/L Final     Chloride   Date Value Ref Range Status   04/30/2024 100 98 - 107 mmol/L Final     Carbon Dioxide (CO2)   Date Value Ref Range Status   04/30/2024 26 22 - 29 mmol/L Final     Anion Gap   Date Value Ref Range Status   04/30/2024 10 7 - 15 mmol/L Final     GLUCOSE BY METER POCT   Date Value Ref Range Status   05/02/2024 181 (H) 70 - 99 mg/dL Final     Urea Nitrogen   Date Value Ref Range Status   04/30/2024 9.7 8.0 - " 23.0 mg/dL Final     Creatinine   Date Value Ref Range Status   04/30/2024 0.94 0.67 - 1.17 mg/dL Final     GFR Estimate   Date Value Ref Range Status   04/30/2024 89 >60 mL/min/1.73m2 Final     Calcium   Date Value Ref Range Status   04/30/2024 9.4 8.8 - 10.2 mg/dL Final

## 2024-05-24 ENCOUNTER — OFFICE VISIT (OUTPATIENT)
Dept: NEUROLOGY | Facility: CLINIC | Age: 69
End: 2024-05-24
Payer: COMMERCIAL

## 2024-05-24 VITALS — SYSTOLIC BLOOD PRESSURE: 121 MMHG | HEART RATE: 83 BPM | DIASTOLIC BLOOD PRESSURE: 77 MMHG

## 2024-05-24 DIAGNOSIS — R25.9 MIXED ACTION AND RESTING TREMOR: ICD-10-CM

## 2024-05-24 DIAGNOSIS — G25.0 FAMILIAL TREMOR: ICD-10-CM

## 2024-05-24 DIAGNOSIS — G25.0 ESSENTIAL TREMOR: Primary | ICD-10-CM

## 2024-05-24 DIAGNOSIS — G20.C PARKINSONISM, UNSPECIFIED PARKINSONISM TYPE (H): ICD-10-CM

## 2024-05-24 DIAGNOSIS — G31.84 MCI (MILD COGNITIVE IMPAIRMENT): ICD-10-CM

## 2024-05-24 PROCEDURE — G2211 COMPLEX E/M VISIT ADD ON: HCPCS | Performed by: PSYCHIATRY & NEUROLOGY

## 2024-05-24 PROCEDURE — 99215 OFFICE O/P EST HI 40 MIN: CPT | Performed by: PSYCHIATRY & NEUROLOGY

## 2024-05-24 RX ORDER — PRIMIDONE 250 MG/1
TABLET ORAL
Qty: 180 TABLET | Refills: 3 | Status: SHIPPED | OUTPATIENT
Start: 2024-05-24

## 2024-05-24 ASSESSMENT — UNIFIED PARKINSONS DISEASE RATING SCALE (UPDRS)
SPEECH: (0) NORMAL: NO SPEECH PROBLEMS.
LEG_AGILITY_LEFT: (0) NORMAL: NO PROBLEMS.
AMPLITUDE_RUE: (1) SLIGHT: < 1 CM IN MAXIMAL AMPLITUDE.
FACIAL_EXPRESSION: (0) NORMAL: NORMAL FACIAL EXPRESSION.
TOTAL_SCORE: 7
CONSTANCY_TREMOR_ATREST: (1) SLIGHT: TREMOR AT REST IS PRESENT 25% OF THE ENTIRE EXAMINATION PERIOD.
TOETAPPING_LEFT: (0) NORMAL: NO PROBLEMS.
POSTURE: (0) NORMAL: NO PROBLEMS.
AMPLITUDE_LUE: (0) NORMAL: NO TREMOR.
TOTAL_SCORE_LEFT: 3
SPONTANEITY_OF_MOVEMENT: (0) NORMAL: NO PROBLEMS.
DYSKINESIAS_PRESENT: NO
TOETAPPING_RIGHT: (0) NORMAL: NO PROBLEMS.
PRONATION_SUPINATION_LEFT: (0) NORMAL: NO PROBLEMS.
FINGER_TAPPING_RIGHT: (1) SLIGHT: ANY OF THE FOLLOWING: A) THE REGULAR RHYTHM IS BROKEN WITH ONE WITH ONE OR TWO INTERRUPTIONS OR HESITATIONS OF THE MOVEMENT  B) SLIGHT SLOWING  C) THE AMPLITUDE DECREMENTS NEAR THE END OF THE 10 MOVEMENTS.
AXIAL_SCORE: 1
PARKINSONS_MEDS: OFF
ARISING_CHAIR: (0) NORMAL: NO PROBLEMS. ABLE TO ARISE QUICKLY WITHOUT HESITATION.
RIGIDITY_RLE: (0) NORMAL: NO RIGIDITY.
HANDMOVEMENTS_RIGHT: (0) NORMAL: NO PROBLEMS.
RIGIDITY_LLE: (0) NORMAL: NO RIGIDITY.
RIGIDITY_LUE: (1) SLIGHT: RIGIDITY ONLY DETECTED WITH ACTIVATION MANEUVER.
HANDMOVEMENTS_LEFT: (0) NORMAL: NO PROBLEMS.
FINGER_TAPPING_LEFT: (0) NORMAL: NO PROBLEMS.
RIGIDITY_RUE: (2) MILD: RIGIDITY DETECTED WITHOUT THE ACTIVATION MANEUVER. FULL RANGE OF MOTION IS EASILY ACHIEVED.
LEG_AGILITY_RIGHT: (0) NORMAL: NO PROBLEMS.
AMPLITUDE_LIP_JAW: (0) NORMAL: NO TREMOR.
PRONATION_SUPINATION_RIGHT: (1) SLIGHT: ANY OF THE FOLLOWING: A) THE REGULAR RHYTHM IS BROKEN WITH ONE WITH ONE OR TWO INTERRUPTIONS OR HESITATIONS OF THE MOVEMENT  B) SLIGHT SLOWING  C) THE AMPLITUDE DECREMENTS NEAR THE END OF THE 10 MOVEMENTS.
AMPLITUDE_LLE: (0) NORMAL: NO TREMOR.
GAIT: (0) NORMAL: NO PROBLEMS.
POSTURAL_STABILITY: (0) NORMAL: NO PROBLEMS. RECOVERS WITH ONE OR TWO STEPS.
FREEZING_GAIT: (0) NORMAL: NO FREEZING.
AMPLITUDE_RLE: (0) NORMAL: NO TREMOR.
TOTAL_SCORE: 12
RIGIDITY_NECK: (1) SLIGHT: RIGIDITY ONLY DETECTED WITH ACTIVATION MANEUVER.

## 2024-05-24 ASSESSMENT — ACTIVITIES OF DAILY LIVING (ADL)
WORKING: (0) NORMAL
POURING: (3) MODERATELY ABNORMAL. MUST USE TWO HANDS OR USES OTHER STRATEGIES TO AVOID SPILLING.
OVERALL_DISABILITY_WITH_THE_MOST_AFFECTED_TASK: WRITING
DRINKING_FROM_A_GLASS: (0) NORMAL
TOTAL_SCORE: 8
USING_KEYS: (0) NORMAL
CARRYING_FOOD_TRAYS_PLATES_OR_SIMILAR_ITEMS: (0) NORMAL
HYGIENE: (0) NORMAL
OVERALL_DISABILITY_WITH_THE_MOST_AFFECTED_TASK: (1) SLIGHTLY ABNORMAL. TREMOR PRESENT BUT DOES NOT AFFECT TASK.
FEEDING_WITH_A_SPOON: (3) MODERATELY ABNORMAL. SPILLS A LOT OR CHANGES STRATEGY TO COMPLETE TASK SUCH AS USING TWO HANDS OR LEANING OVER.
WRITING: (1) SLIGHTLY ABNORMAL. TREMOR PRESENT BUT DOES NOT INTERFERE WITH WRITING.
DRESS: (0) NORMAL
SOCIAL_IMPACT: (0) NORMAL
SPEAKING: (0) NORMAL

## 2024-05-24 NOTE — PATIENT INSTRUCTIONS
Plan:   - Transition to primidone 250 mg twice daily and primidone 1 tab at bedtime. Can increase the dose at night to 100 mg by increasing by 0.5 tab weekly until you reach 100 mg or lowest effective dose.   Movement Disorder-related Medications                   Indication AM lunch dinner       Primidone 250 mg ET 1 1         Primidone 50 mg  ET     1-2       - Neuropsychology referral to repeat Neurospcyhometric evaluation  - Continue to observe symptoms of parkinsonism      Patient to return in 6 months, for in-person visit, 30 minutes.

## 2024-05-24 NOTE — LETTER
2024         RE: Truman Reynolds  [address]  [facility] [address]        Dear Colleague,    Thank you for referring your patient, Truman Reynolds, to the [facility] NEUROLOGY CLINIC [facility]. Please see a copy of my visit note below.    Department of Neurology  Movement Disorders Division     Patient: Truman Reynolds   MRN: [id number]   : 1955   Date of Visit:  2024     Dear Colleague,     Thank you for referring your patient, Mr. Reynolds, to the [facility] NEUROLOGY at [facility]. Please see a copy of my visit note below.    Referring Provider: Zurdo Booker MD   Transferring care from Dr. Barrett as  is a better location for patient    Impression:  Truman Reynolds is a 68 year old male with mixed tremor in setting of Essential Tremor. He reports good control of tremor with primidone and is able to do most of the daily things that he wants to do. Exam shows mostly action tremor with subtle signs of parkinsonism (right sided symptoms of bradykinesia, rigidity, slight resting tremor). This was discussed with Truman. As Essential Tremor is the most bothersome symptom for Truman will adjust primidone and continue to monitor exam.       Mixed Tremor, Essential Tremor: He was seen at [facility], Dr. Freeman, in 3/2022 who recommended weaning off primidone and trialing carbidopa/levodopa. Patient weaned off of primidone and over a month carbidopa/levodopa was titrated up to 2.5 tabs three times daily and patient reported worsening tremor thus carbidopa/levodopa stopped and primidone restarted.  Parkinsonism, subtle on exam  Medication induced tremor: Previously treated with Lithium for 30+ years for depression with improvement of tremor once this medication was discontinued.   Insomnia   DM peripheral neuropathy     Plan:   - Transition to primidone 250 mg twice daily and primidone 1 tab at bedtime. Can increase the dose at night  "to 100 mg by increasing by 0.5 tab weekly until you reach 100 mg or lowest effective dose.   Movement Disorder-related Medications                   Indication AM lunch dinner     Primidone 250 mg ET 1 1      Primidone 50 mg  ET   1-2     - Neuropsychology referral to repeat Neurospcyhometric evaluation  - Will continue to observe symptoms of parkinsonism     Patient to return in 6 months, for in-person visit, 30 minutes.     Vero Leon,   Movement Disorders Specialist  [facility]      Note dictated using voice recognition software.  50 minutes spent on date of encounter doing chart reviews and exam and documentation and further activities as noted above.      Thank you for your referral to our [facility] Movement Disorders [facility], we appreciate the opportunity of being your partner in healthcare. Please feel free to reach out to us at any point if any questions or concerns about this visit.        ------------------------------------------------------------------------------------------------------------      History of Present Illness  Mr. Reynolds is a pleasant 68 year old R handed male with PMH of HTN, HDL, pAfib, DM2, depression, bipolar disorder, insomnia that presents to Neurology Movement clinic as a new patient for management of Parkinson Disease. He previously followed with Dr. Barrett and currently follows with Dr. Sabra Way, PharmD.    History obtained from patient.   His main complaint is tremor, R > L hand. He would like to increase dose of primidone. He denies side effect on current dose of primidone. He reports good tremor control with primidone.   Patient reports a traveling numb spot in back.  Stiffness in legs, started 8 months ago. Walks 2 miles every day and stretches. This helps the stiffness and when he walks, \"my legs feel normal\". He notes numbness in the balls of his feet.   Initial symptom/side: Tremors in hands, R > L, began in 30s year old. Patient was diagnosed " "with ET around 2005 by a neurologist. He has a hard time turning a screw  but this is manageable with primidone.   Disease progression: Tremors in hand have progressed and worsened in his 50s year old.   He underwent Deep Brain Stimulation workup at the  in 11/2022 and the consensus was a left GPi Deep Brain Stimulation 'wait and see' approach to treat his right body symptoms utilizing [place] Sci Genus R16 with two extension wires. 'As Truman met criteria for MCI and he was on anticoagulation for a small prior stroke, it was felt that the 'wait and see' option would be best to ensure that his first side goes well without complication before deciding on moving ahead with his second side.   Reason Deep Brain Stimulation was NOT pursued: Due to concern possible Parkinson Disease.and also symptoms seemed controlled on primidone thus patient decided not to pursue with Deep Brain Stimulation.     Current treatment for tremors:   Movement Disorder-related Medications                   Indication AM lunch dinner              Primidone 50 mg ET 5 4 1                           Previous treatment for tremors:   - Carbidopa/levodopa: \"Made me ill and it also made me turn ana yellow looking and my tremors were WAY worse.\"     Exacerbating factors: Uncertain, \"maybe when I'm working with my hands for fine motor skills.\"   Relieving factors: exercise and keeping on moving and primidone    Does alcohol relieve symptoms: doesn't drink alcohol   Caffeine intake: Drink decaf, doesn't affect tremor but regular coffee did worsen tremor.  Change in handwriting: Yes, only prints not which is legible. Cursive is hard to read.    Resting tremor: denies.  Bradykinesia: denies  Rigidity: in legs, calves  Gait problem: No change in gait.   Balance/Falls: denies falls or balance issues  Numbness/Tingling: see above  Dystonia: deneis  Pain: denies     Memory problems: Seen by Dr Doyle in 10/2022. He states that he is \"good\".  Mood " "issues: Follows with Psychiatry, Dr. Sanchez and is treated with lamotrigine. Had been treated on Lithium for 30 years for management of depression. Since stopping lithium tremors have vastly improved, per patient. He had been suicidal and has not been hospitalized for mood issues. At age 16 years - wire attempt to hang himself. Youngest sister \"susan\" has had depressed. Per chart review, his previous mood medications are: fluoxetine, Abilify, Effexor, Seroquel, Wellbutrin ,Clonazepam, Adderall, Lamictal, trazodone, Doxepin, Nefazodone, Valium, Hydroxyzine, Depakote   History of dopamine depleting therapies: see above  Sleep issues/Dream enactment: No issues.  Sense of smell: no issues  Constipation: daily BMs  Speech:  no issues  Facial expression:  no issues  Swallowing: no issues  Autonomic dysfunction: May get dizzy when he stands to quickly, this is intermittent. Drinks over a half gallon of water a day.   Family Hx of tremor: Yes, mother and 2 brother.  He has had substance problems in the past, quit age 36 yrs. Sober of alcohol, and other substances for years. Shot heroin in [place]. Has done psychedelic medication. Had a dwi long time ago.    TETRAS ADL Subscale (48 max)  (0=Normal 1=Slightly abnormal 2=Mildly 3=Moderately 4= Severely)    Tremor ADL Scale  1. Speaking 0   2. Feeding with a spoon 3 (He usese a bigger spoon and holds utensils differntly to improve hold. Drinks soup out of a mug.)   3. Drinking from a glass 0   4. Hygiene 0   5. Dressing 0   6. Pouring 3   7. Carrying food trays, plates or similar items 0   8. Using keys 0   9. Writing 1 (prints mostly)   10. Working 0   11. Overall disability with the most affected task 1   Name of most affected task writing   12. Social impact 0   ADL TOTAL 8       Review of Systems:  Other than that mentioned above, the remainder of 12 systems reviewed were negative.    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       amitriptyline (ELAVIL) 100 MG tablet Take 2 tablets by mouth daily       atorvastatin (LIPITOR) 40 MG tablet Take 2 tablets (80 mg) by mouth daily 90 tablet 3     blood glucose (ONETOUCH ULTRA) test strip TESTING FOUR TIMES DAILY (BEFORE BREAKFAST, BEFORE LUNCH, BEFORE SUPPER AND 2 HOURS AFTER SUPPER)       ciclopirox (PENLAC) 8 % external solution APPLY SOLUTION TOPICALLY EVERY EVENING       dulaglutide (TRULICITY) 1.5 MG/0.5ML pen Inject 1.5 mg Subcutaneous every 7 days       EPINEPHrine (ANY BX GENERIC EQUIV) 0.3 MG/0.3ML injection 2-pack Inject 0.3 mg into the muscle as needed       guaiFENesin-codeine (ROBITUSSIN AC) 100-10 MG/5ML solution TAKE 5 TO 10 ML BY MOUTH EVERY 4 HOURS AS NEEDED       insulin lispro (HUMALOG KWIKPEN) 100 UNIT/ML (1 unit dial) KWIKPEN Inject 5 Units subcutaneously three times daily before meals. Plus correction scale, Target 150, Sensitivity 50, TDD= 25 units       ipratropium (ATROVENT) 0.06 % nasal spray Place 2 Sprays into both nostrils 4 times daily as needed for Allergies.       lamoTRIgine (LAMICTAL) 100 MG tablet Take 0.5 tablets by mouth daily       Lancets (ONETOUCH DELICA PLUS [id number]) MISC        loratadine (CLARITIN) 10 MG tablet Take 10 mg by mouth daily as needed       metoprolol succinate ER (TOPROL XL) 25 MG 24 hr tablet  "Take 1 tablet (25 mg) by mouth daily 30 tablet 11     nitroGLYcerin (NITROSTAT) 0.4 MG sublingual tablet One tablet under the tongue every 5 minutes if needed for chest pain. May repeat every 5 minutes for a maximum of 3 doses in 15 minutes\" 25 tablet 3     olmesartan (BENICAR) 40 MG tablet Take 40 mg by mouth daily       olopatadine (PATADAY) 0.2 % ophthalmic solution Place 1 Drop into both eyes daily as needed       polyethylene glycol (MIRALAX) 17 GM/Dose powder Take 17 g by mouth daily as needed       primidone (MYSOLINE) 50 MG tablet TAKE 5 TABLETS BY MOUTH TWICE DAILY 300 tablet 3     ramelteon (ROZEREM) 8 MG tablet Take 8 mg by mouth at bedtime       insulin glargine (LANTUS SOLOSTAR) 100 UNIT/ML pen Inject 21 Units Subcutaneous At Bedtime       isosorbide mononitrate (IMDUR) 30 MG 24 hr tablet Take 1 tablet (30 mg) by mouth daily (Patient not taking: Reported on 5/24/2024) 30 tablet 2     rivaroxaban ANTICOAGULANT (XARELTO) 20 MG TABS tablet Take 20 mg by mouth daily             Allergies   Allergen Reactions     Bee Venom Anaphylaxis, Swelling and Hives     Chicken-Derived Products (Egg)      Other reaction(s): Gastrointestinal, Other (see comments)  Can eat egg as ingredient, but egg by itself creates bad GI upset  Can eat egg as ingredient, but egg by itself creates bad GI upset    Other reaction(s): Gastrointestinal  Can eat egg as ingredient, but egg by itself creates bad GI upset  Other reaction(s): Gastrointestinal, Other (see comments)  Can eat egg as ingredient, but egg by itself creates bad GI upset  Can eat egg as ingredient, but egg by itself creates bad GI upset     Egg White (Egg Protein) Nausea     Sinemet [Carbidopa W-Levodopa]      Tremor increased     Aspirin Other (See Comments) and Nausea     Other reaction(s): GI intolerance  tolerates 81 mg EC tablets  GI upset, tolerates 81 mg EC tablets    GI upset, tolerates 81 mg EC tablets     Empagliflozin Other (See Comments)     Other " "reaction(s): Other (see comments)  UTI, Yeast infection  UTI, Yeast infection    UTI, Yeast infection     Levofloxacin Other (See Comments)     Other reaction(s): Other (see comments)  Swelling of arm tendons  Swelling of arm tendons    Swelling of arm tendons     Metformin      Other reaction(s): Gastrointestinal, GI intolerance  Other reaction(s): Gastrointestinal  GI intolerance     Norfloxacin Other (See Comments)     Other reaction(s): Other (see comments)  Severe headache  Severe headache    Severe headache     Oxycodone Other (See Comments)     Other reaction(s): Other (see comments)  \"Feels Weird\"  \"Feels Weird\"    \"Feels Weird\"     Penicillins Rash     blotchy, hives  blotchy, hives           Physical Exam:  The patient's  blood pressure is 121/77 and his pulse is 83.        5/24/2024     8:00 AM   Tremor Motor Scale   Medication On   DBS - Right Brain None   DBS - Left Brain None   Head 1   Face & Jaw 0   Voice 0   Outstretched - RIGHT 1.5   Outstretched - LEFT 1.5   Wingbeating - RIGHT 1.5   Wingbeating - LEFT 1.5   Kinetic - RIGHT 1.5   Kinetic - LEFT 1.5   Lower Limb - RIGHT 0.5   Lower Limb - LEFT 0.5   Lower Limb (Max) 0.5   Spiral - RIGHT 1.5   Spiral - LEFT 1   Handwriting 1   Dot approx - RIGHT 1.5   Dot approx - LEFT 1.5   Trunk (Standing) 0   Total Right 8   Total Left 7.5   Axial 1   TOTAL 17         5/24/2024     8:00 AM   UPDRS Motor Scale   Medication Off   R Brain DBS: None   L Brain DBS: None   Dyskinesia (LID) No   Speech 0   Facial Expression 0   Rigidity Neck 1   Rigidity RUE 2   Rigidity LUE 1   Rigidity RLE 0   Rigidity LLE 0   Finger Taps R 1   Finger Taps L 0   Hand Mvt R 0   Hand Mvt L 0   Pron-/Supinate R 1   Pron-/Supinate L 0   Toe Tap R 0   Toe Tap L 0   Leg Agility R 0   Leg Agility L 0   Arise From Chair 0   Gait 0   Gait Freezing 0   Postural Stability 0   Posture 0   Global Spont Mvt 0   Postural Tremor RUE 1   Postural Tremor LUE 1   Kinetic Tremor RUE 1   Kinetic Tremor " "LUE 1   Rest Tremor RUE 1   Rest Tremor LUE 0   Rest Tremor RLE 0   Rest Tremor LLE 0   Rest Tremor Lip/Jaw 0   Rest Tremor Constancy 1   Total Right 7   Total Left 3   Axial Total 1   Total 12     Data Reviewed: I have personally reviewed the tests/studies below.   - Brain mRI 12/11/2020 (Was off lithium for 5 days for this exam)  1.  No acute infarct, mass, mass effect, or hemorrhage.   2.  Minimal to mild chronic small vessel ischemia.   3.  Mild atrophy    10/19/2022 MRI brain   Impression:  1. No acute intracranial pathology.  2. 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 This can be seen with Parkinson disease or Lewy Body Dementia. Recommend clinical correlation.  3. Several scattered nonspecific T2 hyperintense foci in cerebral white matter. There are also T2 hyperintense lesions involving the cortex and subcortical white matter in the left precentral and  postcentral gyrus. Differentials are broad and include sequela of prior inflammatory/infectious or vascular insults.    9/2022 DATscan  Impression: A presynaptic dopaminergic deficit is present.       4/2024 \"A1C\": 7.4%  No results found for: \"TSH\"  CBC RESULTS:   Recent Labs   Lab Test 04/30/24  0949   WBC 5.4   RBC 4.32*   HGB 13.8   HCT 41.1   MCV 95   MCH 31.9   MCHC 33.6   RDW 12.4        Last Comprehensive Metabolic Panel:  Sodium   Date Value Ref Range Status   04/30/2024 136 135 - 145 mmol/L Final     Comment:     Reference intervals for this test were updated on 09/26/2023 to more accurately reflect our healthy population. There may be differences in the flagging of prior results with similar values performed with this method. Interpretation of those prior results can be made in the context of the updated reference intervals.      Potassium   Date Value Ref Range Status   04/30/2024 4.8 3.4 - 5.3 mmol/L Final     Chloride   Date Value Ref Range Status   04/30/2024 100 98 - 107 mmol/L Final     Carbon Dioxide (CO2)   Date Value Ref " Range Status   04/30/2024 26 22 - 29 mmol/L Final     Anion Gap   Date Value Ref Range Status   04/30/2024 10 7 - 15 mmol/L Final     GLUCOSE BY METER POCT   Date Value Ref Range Status   05/02/2024 181 (H) 70 - 99 mg/dL Final     Urea Nitrogen   Date Value Ref Range Status   04/30/2024 9.7 8.0 - 23.0 mg/dL Final     Creatinine   Date Value Ref Range Status   04/30/2024 0.94 0.67 - 1.17 mg/dL Final     GFR Estimate   Date Value Ref Range Status   04/30/2024 89 >60 mL/min/1.73m2 Final     Calcium   Date Value Ref Range Status   04/30/2024 9.4 8.8 - 10.2 mg/dL Final           Again, thank you for allowing me to participate in the care of your patient.        Sincerely,        Vero Leon DO

## 2024-05-24 NOTE — Clinical Note
Truman is doing great! Will adjust his primidone and continue to monitor exam for Parkinsonism.   Vero

## 2024-05-24 NOTE — NURSING NOTE
Chief Complaint   Patient presents with    Consult     Tremor, essential  Referred by Zurdo Booker MD Kena Gemeda, MA on 5/24/2024 at 7:29 AM

## 2024-06-20 ENCOUNTER — OFFICE VISIT (OUTPATIENT)
Dept: CARDIOLOGY | Facility: CLINIC | Age: 69
End: 2024-06-20
Attending: INTERNAL MEDICINE
Payer: COMMERCIAL

## 2024-06-20 VITALS
HEIGHT: 74 IN | WEIGHT: 253 LBS | HEART RATE: 85 BPM | SYSTOLIC BLOOD PRESSURE: 125 MMHG | DIASTOLIC BLOOD PRESSURE: 69 MMHG | RESPIRATION RATE: 14 BRPM | BODY MASS INDEX: 32.47 KG/M2

## 2024-06-20 DIAGNOSIS — E11.41 TYPE 2 DIABETES MELLITUS WITH DIABETIC MONONEUROPATHY, WITH LONG-TERM CURRENT USE OF INSULIN (H): ICD-10-CM

## 2024-06-20 DIAGNOSIS — I10 BENIGN ESSENTIAL HYPERTENSION: ICD-10-CM

## 2024-06-20 DIAGNOSIS — I25.118 CORONARY ARTERY DISEASE OF NATIVE ARTERY OF NATIVE HEART WITH STABLE ANGINA PECTORIS (H): ICD-10-CM

## 2024-06-20 DIAGNOSIS — I48.19 PERSISTENT ATRIAL FIBRILLATION (H): ICD-10-CM

## 2024-06-20 DIAGNOSIS — Z79.4 TYPE 2 DIABETES MELLITUS WITH DIABETIC MONONEUROPATHY, WITH LONG-TERM CURRENT USE OF INSULIN (H): ICD-10-CM

## 2024-06-20 DIAGNOSIS — R93.1 AGATSTON CORONARY ARTERY CALCIUM SCORE GREATER THAN 400: ICD-10-CM

## 2024-06-20 PROCEDURE — 99214 OFFICE O/P EST MOD 30 MIN: CPT | Performed by: INTERNAL MEDICINE

## 2024-06-20 RX ORDER — NITROGLYCERIN 0.4 MG/1
TABLET SUBLINGUAL
Qty: 25 TABLET | Refills: 3 | Status: SHIPPED | OUTPATIENT
Start: 2024-06-20

## 2024-06-20 RX ORDER — AMLODIPINE BESYLATE 5 MG/1
5 TABLET ORAL DAILY
Qty: 90 TABLET | Refills: 3 | Status: SHIPPED | OUTPATIENT
Start: 2024-06-20

## 2024-06-20 RX ORDER — METOPROLOL SUCCINATE 25 MG/1
25 TABLET, EXTENDED RELEASE ORAL DAILY
Qty: 90 TABLET | Refills: 3 | Status: SHIPPED | OUTPATIENT
Start: 2024-06-20

## 2024-06-20 NOTE — PROGRESS NOTES
[facility] HEART CARE   [address]  [place] [address]   [email or web address]   OFFICE: [phone]     CARDIOLOGY CLINIC NOTE     Thank you, Kamala North, for asking the [facility] Heart Care team to see Mr. Truman Reynlods to evaluate Follow Up         Assessment/Recommendations   Assessment:    Coronary artery disease with a severely elevated coronary calcium score of >1000 and stenosis of a small diagonal coronary artery not optimal for revascularization. Has class II-III angina  Persistent atrial fibrillation - currently in atrial fibrillation with borderline controlled rate. CHADS2-vasc score of at least 6 (age, HTN, CAD, DMII, CVA). On rivaroxaban for stroke prevention.  Hypertension - controlled.  Dyslipidemia - on atorvastatin 40 mg    Plan:  Discontinue Imdur due to side effects (headache)  Start amlodipine 5 mg daily  Follow up in a year or sooner if needed.         History of Present Illness   Mr. Truman Reynolds is a 68 year old male with a significant past history of PAF, HTN, bipolar type I, DMII, SANGITA, CVA, R lower lobe pneumonectomy due to organizing pneumonia, who presents for evaluation of coronary artery disease.    Mr. Reynolds has single vessel coronary artery disease involving a small diagonal branch that is not optimal for revascularization. He did not tolerate Imdur due to headaches. Has regular mild chest discomfort that was not present while on imdur but returned when this was discontinued.      Other than noted above, Mr. Reynolds denies any chest pain/pressure/tightness, shortness of breath at rest or with exertion, light headedness/dizziness, pre-syncope, syncope, lower extremity swelling, palpitations, paroxysmal nocturnal dyspnea (PND), or orthopnea.     Cardiac Problems and Cardiac Diagnostics     Most Recent Cardiac testing:  ECG dated 5/2/24 (personaly reviewed and interpreted): sinus rhythm with first degree AV block and  occasional PVCs.    TTE 5/2/24  The left ventricle is normal in size. There is normal left ventricular wall thickness.  Left ventricular systolic function is normal. The visual ejection fraction is 55-60%. There is mild hypokinesis of the apex.     The right ventricle is not well visualized.  The left atrium is not well visualized. Right atrium not well visualized.  No significant valvular disease  There is no comparison study available.    CT cardiac calcium score 4/19/24  Summary    1. The patient's total coronary artery calcium score is 1238 using the Agatston scoring method. The patient is above the 90th percentile for age and gender.     Coronary angiogram 5/2/24  CONCLUSIONS: Single-vessel obstructive coronary artery disease involving the moderate sized ostial to proximal diagonal branch with moderate nonobstructive disease elsewhere primarily involving the large left circumflex and OM1 bifurcation (iFR 0.95 and 0.93 respectively).    RECOMMENDATIONS:   Usual postprocedure cares, please see orders.  Recommend trial of medical management of patient's coronary artery disease.  Diagonal branch is a suboptimal target for percutaneous revascularization given size and  would require stent extension into the left anterior descending artery.  Aggressive risk factor modification for secondary prevention.       Medications  Allergies   Current Outpatient Medications   Medication Sig Dispense Refill    albuterol (PROVENTIL) (2.5 MG/3ML) 0.083% neb solution 2.5 mg by Nebulization route every 6 hours as needed for Wheezing.      amitriptyline (ELAVIL) 100 MG tablet Take 2 tablets by mouth daily      amLODIPine (NORVASC) 5 MG tablet Take 1 tablet (5 mg) by mouth daily 90 tablet 3    atorvastatin (LIPITOR) 40 MG tablet Take 2 tablets (80 mg) by mouth daily 90 tablet 3    blood glucose (ONETOUCH ULTRA) test strip TESTING FOUR TIMES DAILY (BEFORE BREAKFAST, BEFORE LUNCH, BEFORE SUPPER AND 2 HOURS AFTER SUPPER)      ciclopirox  "(PENLAC) 8 % external solution APPLY SOLUTION TOPICALLY EVERY EVENING      dulaglutide (TRULICITY) 1.5 MG/0.5ML pen Inject 1.5 mg Subcutaneous every 7 days      EPINEPHrine (ANY BX GENERIC EQUIV) 0.3 MG/0.3ML injection 2-pack Inject 0.3 mg into the muscle as needed      guaiFENesin-codeine (ROBITUSSIN AC) 100-10 MG/5ML solution TAKE 5 TO 10 ML BY MOUTH EVERY 4 HOURS AS NEEDED      insulin lispro (HUMALOG KWIKPEN) 100 UNIT/ML (1 unit dial) KWIKPEN Inject 5 Units subcutaneously three times daily before meals. Plus correction scale, Target 150, Sensitivity 50, TDD= 25 units      ipratropium (ATROVENT) 0.06 % nasal spray Place 2 Sprays into both nostrils 4 times daily as needed for Allergies.      lamoTRIgine (LAMICTAL) 100 MG tablet Take 0.5 tablets by mouth daily      Lancets (ONETOUCH DELICA PLUS [id number]) MISC       loratadine (CLARITIN) 10 MG tablet Take 10 mg by mouth daily as needed      metoprolol succinate ER (TOPROL XL) 25 MG 24 hr tablet Take 1 tablet (25 mg) by mouth daily 90 tablet 3    nitroGLYcerin (NITROSTAT) 0.4 MG sublingual tablet One tablet under the tongue every 5 minutes if needed for chest pain. May repeat every 5 minutes for a maximum of 3 doses in 15 minutes\" 25 tablet 3    olmesartan (BENICAR) 40 MG tablet Take 40 mg by mouth daily      olopatadine (PATADAY) 0.2 % ophthalmic solution Place 1 Drop into both eyes daily as needed      polyethylene glycol (MIRALAX) 17 GM/Dose powder Take 17 g by mouth daily as needed      primidone (MYSOLINE) 250 MG tablet Take 1 tablet (250 mg) twice daily in the 8 am and 1 pm 180 tablet 3    primidone (MYSOLINE) 50 MG tablet TAKE 5 TABLETS BY MOUTH TWICE DAILY 300 tablet 3    ramelteon (ROZEREM) 8 MG tablet Take 8 mg by mouth at bedtime      insulin glargine (LANTUS SOLOSTAR) 100 UNIT/ML pen Inject 21 Units Subcutaneous At Bedtime      rivaroxaban ANTICOAGULANT (XARELTO) 20 MG TABS tablet Take 20 mg by mouth daily        Allergies   Allergen Reactions    Bee " "Venom Anaphylaxis, Swelling and Hives    Chicken-Derived Products (Egg) GI Disturbance     Other reaction(s): Gastrointestinal, Other (see comments)  Can eat egg as ingredient, but egg by itself creates bad GI upset.    Egg White (Egg Protein) Nausea    Sinemet [Carbidopa W-Levodopa]      Tremor increased    Aspirin Other (See Comments) and Nausea     Other reaction(s): GI intolerance  tolerates 81 mg EC tablets.    Empagliflozin Other (See Comments)     Other reaction(s): Other (see comments)  UTI, Yeast infection  UTI, Yeast infection    UTI, Yeast infection    Levofloxacin Other (See Comments)     Other reaction(s): Other (see comments)  Swelling of arm tendons  Swelling of arm tendons    Swelling of arm tendons    Metformin      Other reaction(s): Gastrointestinal, GI intolerance  Other reaction(s): Gastrointestinal  GI intolerance    Norfloxacin Other (See Comments)     Other reaction(s): Other (see comments)  Severe headache  Severe headache    Severe headache    Oxycodone Other (See Comments)     Other reaction(s): Other (see comments)  \"Feels Weird\"  \"Feels Weird\"    \"Feels Weird\"    Penicillins Rash     blotchy, hives  blotchy, hives          Physical Examination Review of Systems   Vitals: /69 (BP Location: Right arm, Patient Position: Sitting, Cuff Size: Adult Large)   Pulse 85   Resp 14   Ht 1.88 m (6' 2\")   Wt 114.8 kg (253 lb)   BMI 32.48 kg/m    BMI= Body mass index is 32.48 kg/m .  Wt Readings from Last 3 Encounters:   06/20/24 114.8 kg (253 lb)   05/02/24 112.5 kg (248 lb)   04/24/24 113.4 kg (250 lb)       General: pleasant male. No acute distress.   Neck: No JVD  Lungs: clear to auscultation  COR: normal rate, regular rhythm, No murmurs, rubs, or gallops  Extrem: No edema        Please refer above for cardiac ROS details.       Past History     Family History:   Family History   Problem Relation Age of Onset    Bronchitis Mother     Other - See Comments Mother         blood clot - " emboli to brain    Cerebrovascular Disease Mother     Tremor Mother     Lung Cancer Father     Brain Cancer Sister     Tremor Sister     Other - See Comments Sister         [place]    Hypertension Brother     Other - See Comments Brother         [facility]    Tremor Brother     Arthritis Brother     Other - See Comments Brother         [place]    Tremor Brother     Other - See Comments Brother         [place]    Other - See Comments Other         step daughter in [place]    Other - See Comments Grandson         great grand daughter    Other - See Comments Granddaughter         great grand son       Social History:   Social History     Socioeconomic History    Marital status:      Spouse name: Not on file    Number of children: Not on file    Years of education: Not on file    Highest education level: Not on file   Occupational History    Not on file   Tobacco Use    Smoking status: Former    Smokeless tobacco: Never    Tobacco comments:     former smoker 2014   Substance and Sexual Activity    Alcohol use: Not on file    Drug use: Not Currently    Sexual activity: Not on file   Other Topics Concern    Not on file   Social History Narrative    Medical history is significant for essential hypertension, bipolar disorder type 1, abnormal involuntary movement, acquired trigger finger, asthma, carpal tunnel syndrome, degenerative joint disease, diabetes mellitus type 2, familial tremor, history of atrial fibrillation, history of hepatitis C, hyperlipidemia, lower urinary tract symptoms, migraine headache, obesity, obstructive sleep apnea, osteoarthritis, polysubstance dependence, recurrent major depressive episodes, lobectomy of lung, and tubular adenoma of colon. He sustained a cerebrovascular accident due to stenosis of left middle cerebral artery in 2019. He denied history of concussion, traumatic brain injury, and seizure. He reported remote history of migraines. He endorsed  neuropathy in the ball of his foot. Family medical and psychiatric history is significant for tremor (mother, 2 brothers), brain tumor (sister), and alcohol use disorder (maternal grandfather, paternal uncles).      Social Determinants of Health     Financial Resource Strain: Not At Risk (6/18/2023)    [facility]    Financial Resource Strain     Is it hard for you to pay for the very basics like food, housing, medical care or heating?: No   Food Insecurity: Not At Risk (6/18/2023)    [facility]    Food Insecurity     Does your food run out before you have the money to buy more?: No   Transportation Needs: Not At Risk (6/18/2023)    [facility]    Transportation Needs     Does a lack of transportation keep you from your medical appointments or from getting your medications?: No   Physical Activity: Insufficiently Active (2/28/2022)    Received from [facility]    Exercise Vital Sign     Days of Exercise per Week: 1 day     Minutes of Exercise per Session: 20 min   Stress: No Stress Concern Present (2/28/2022)    Received from [facility]    [place] [employer] - Occupational Stress Questionnaire     Feeling of Stress : Not at all   Social Connections: Moderately Integrated (2/28/2022)    Received from [facility]    Social Connection and Isolation Panel [NHANES]     Frequency of Communication with Friends and Family: Three times a week     Frequency of Social Gatherings with Friends and Family: Three times a week     Attends Shinto Services: Never     Active Member of Clubs or Organizations: Yes     Attends Club or Organization Meetings: More than 4 times per year     Marital Status:    Interpersonal Safety: Not At Risk (2/28/2022)    Received from [facility]    Humiliation, Afraid, Rape, and Kick questionnaire     Fear of Current or Ex-Partner: No     Emotionally Abused: No     Physically Abused: No     Sexually Abused: No   Housing  Stability: Low Risk  (2/28/2022)    Received from [facility]    Housing Stability Vital Sign     Unable to Pay for Housing in the Last Year: No     In the last 12 months, how many places have you lived?: 1     In the last 12 months, was there a time when you did not have a steady place to sleep or slept in a shelter (including now)?: No            Lab Results    Chemistry/lipid CBC Cardiac Enzymes/BNP/TSH/INR   Lab Results   Component Value Date    CHOL 127 04/30/2024    HDL 40 04/30/2024    TRIG 89 04/30/2024    BUN 9.7 04/30/2024     04/30/2024    CO2 26 04/30/2024    Lab Results   Component Value Date    WBC 5.4 04/30/2024    HGB 13.8 04/30/2024    HCT 41.1 04/30/2024    MCV 95 04/30/2024     04/30/2024    Lab Results   Component Value Date    INR 1.2 (H) 05/02/2022

## 2024-06-20 NOTE — PATIENT INSTRUCTIONS
It was a pleasure to meet with you today.      Below is a summary of your visit.   I have discontinued your isosorbide mononitrate  Start taking amlodipine 5 mg once daily to help reduce your chest pain symptoms.   Continue your other medications without changes.  I encourage regular activity/exercise as tolerated.  Follow up with me in a year or sooner if needed      Please do not hesitate to call the [facility] with any questions or concerns at [phone]. You can also reach my nurse, Marci, at [phone].    Sincerely,

## 2024-06-20 NOTE — LETTER
6/20/2024    Amor Edwards MD  [address] [place] [address]    RE: Truman Reynolds       Dear Colleague,     I had the pleasure of seeing Truman Reynolds in the [facility].    [facility]   [address]  [place] [address]   [email or web address]   OFFICE: [phone]     CARDIOLOGY CLINIC NOTE     Thank you, Kamala North, for asking the [facility] team to see Mr. Truman Reynolds to evaluate Follow Up         Assessment/Recommendations   Assessment:    Coronary artery disease with a severely elevated coronary calcium score of >1000 and stenosis of a small diagonal coronary artery not optimal for revascularization. Has class II-III angina  Persistent atrial fibrillation - currently in atrial fibrillation with borderline controlled rate. CHADS2-vasc score of at least 6 (age, HTN, CAD, DMII, CVA). On rivaroxaban for stroke prevention.  Hypertension - controlled.  Dyslipidemia - on atorvastatin 40 mg    Plan:  Discontinue Imdur due to side effects (headache)  Start amlodipine 5 mg daily  Follow up in a year or sooner if needed.         History of Present Illness   Mr. Truman Reynolds is a 68 year old male with a significant past history of PAF, HTN, bipolar type I, DMII, SANGITA, CVA, R lower lobe pneumonectomy due to organizing pneumonia, who presents for evaluation of coronary artery disease.    Mr. Reynolds has single vessel coronary artery disease involving a small diagonal branch that is not optimal for revascularization. He did not tolerate Imdur due to headaches. Has regular mild chest discomfort that was not present while on imdur but returned when this was discontinued.      Other than noted above, Mr. Reynolds denies any chest pain/pressure/tightness, shortness of breath at rest or with exertion, light headedness/dizziness, pre-syncope, syncope, lower extremity swelling, palpitations, paroxysmal nocturnal  dyspnea (PND), or orthopnea.     Cardiac Problems and Cardiac Diagnostics     Most Recent Cardiac testing:  ECG dated 5/2/24 (personaly reviewed and interpreted): sinus rhythm with first degree AV block and occasional PVCs.    TTE 5/2/24  The left ventricle is normal in size. There is normal left ventricular wall thickness.  Left ventricular systolic function is normal. The visual ejection fraction is 55-60%. There is mild hypokinesis of the apex.     The right ventricle is not well visualized.  The left atrium is not well visualized. Right atrium not well visualized.  No significant valvular disease  There is no comparison study available.    CT cardiac calcium score 4/19/24  Summary    1. The patient's total coronary artery calcium score is 1238 using the Agatston scoring method. The patient is above the 90th percentile for age and gender.     Coronary angiogram 5/2/24  CONCLUSIONS: Single-vessel obstructive coronary artery disease involving the moderate sized ostial to proximal diagonal branch with moderate nonobstructive disease elsewhere primarily involving the large left circumflex and OM1 bifurcation (iFR 0.95 and 0.93 respectively).    RECOMMENDATIONS:   Usual postprocedure cares, please see orders.  Recommend trial of medical management of patient's coronary artery disease.  Diagonal branch is a suboptimal target for percutaneous revascularization given size and  would require stent extension into the left anterior descending artery.  Aggressive risk factor modification for secondary prevention.       Medications  Allergies   Current Outpatient Medications   Medication Sig Dispense Refill    albuterol (PROVENTIL) (2.5 MG/3ML) 0.083% neb solution 2.5 mg by Nebulization route every 6 hours as needed for Wheezing.      amitriptyline (ELAVIL) 100 MG tablet Take 2 tablets by mouth daily      amLODIPine (NORVASC) 5 MG tablet Take 1 tablet (5 mg) by mouth daily 90 tablet 3    atorvastatin (LIPITOR) 40 MG tablet  "Take 2 tablets (80 mg) by mouth daily 90 tablet 3    blood glucose (ONETOUCH ULTRA) test strip TESTING FOUR TIMES DAILY (BEFORE BREAKFAST, BEFORE LUNCH, BEFORE SUPPER AND 2 HOURS AFTER SUPPER)      ciclopirox (PENLAC) 8 % external solution APPLY SOLUTION TOPICALLY EVERY EVENING      dulaglutide (TRULICITY) 1.5 MG/0.5ML pen Inject 1.5 mg Subcutaneous every 7 days      EPINEPHrine (ANY BX GENERIC EQUIV) 0.3 MG/0.3ML injection 2-pack Inject 0.3 mg into the muscle as needed      guaiFENesin-codeine (ROBITUSSIN AC) 100-10 MG/5ML solution TAKE 5 TO 10 ML BY MOUTH EVERY 4 HOURS AS NEEDED      insulin lispro (HUMALOG KWIKPEN) 100 UNIT/ML (1 unit dial) KWIKPEN Inject 5 Units subcutaneously three times daily before meals. Plus correction scale, Target 150, Sensitivity 50, TDD= 25 units      ipratropium (ATROVENT) 0.06 % nasal spray Place 2 Sprays into both nostrils 4 times daily as needed for Allergies.      lamoTRIgine (LAMICTAL) 100 MG tablet Take 0.5 tablets by mouth daily      Lancets (ONETOUCH DELICA PLUS [id number]) MISC       loratadine (CLARITIN) 10 MG tablet Take 10 mg by mouth daily as needed      metoprolol succinate ER (TOPROL XL) 25 MG 24 hr tablet Take 1 tablet (25 mg) by mouth daily 90 tablet 3    nitroGLYcerin (NITROSTAT) 0.4 MG sublingual tablet One tablet under the tongue every 5 minutes if needed for chest pain. May repeat every 5 minutes for a maximum of 3 doses in 15 minutes\" 25 tablet 3    olmesartan (BENICAR) 40 MG tablet Take 40 mg by mouth daily      olopatadine (PATADAY) 0.2 % ophthalmic solution Place 1 Drop into both eyes daily as needed      polyethylene glycol (MIRALAX) 17 GM/Dose powder Take 17 g by mouth daily as needed      primidone (MYSOLINE) 250 MG tablet Take 1 tablet (250 mg) twice daily in the 8 am and 1 pm 180 tablet 3    primidone (MYSOLINE) 50 MG tablet TAKE 5 TABLETS BY MOUTH TWICE DAILY 300 tablet 3    ramelteon (ROZEREM) 8 MG tablet Take 8 mg by mouth at bedtime      insulin " "glargine (LANTUS SOLOSTAR) 100 UNIT/ML pen Inject 21 Units Subcutaneous At Bedtime      rivaroxaban ANTICOAGULANT (XARELTO) 20 MG TABS tablet Take 20 mg by mouth daily        Allergies   Allergen Reactions    Bee Venom Anaphylaxis, Swelling and Hives    Chicken-Derived Products (Egg) GI Disturbance     Other reaction(s): Gastrointestinal, Other (see comments)  Can eat egg as ingredient, but egg by itself creates bad GI upset.    Egg White (Egg Protein) Nausea    Sinemet [Carbidopa W-Levodopa]      Tremor increased    Aspirin Other (See Comments) and Nausea     Other reaction(s): GI intolerance  tolerates 81 mg EC tablets.    Empagliflozin Other (See Comments)     Other reaction(s): Other (see comments)  UTI, Yeast infection  UTI, Yeast infection    UTI, Yeast infection    Levofloxacin Other (See Comments)     Other reaction(s): Other (see comments)  Swelling of arm tendons  Swelling of arm tendons    Swelling of arm tendons    Metformin      Other reaction(s): Gastrointestinal, GI intolerance  Other reaction(s): Gastrointestinal  GI intolerance    Norfloxacin Other (See Comments)     Other reaction(s): Other (see comments)  Severe headache  Severe headache    Severe headache    Oxycodone Other (See Comments)     Other reaction(s): Other (see comments)  \"Feels Weird\"  \"Feels Weird\"    \"Feels Weird\"    Penicillins Rash     blotchy, hives  blotchy, hives          Physical Examination Review of Systems   Vitals: /69 (BP Location: Right arm, Patient Position: Sitting, Cuff Size: Adult Large)   Pulse 85   Resp 14   Ht 1.88 m (6' 2\")   Wt 114.8 kg (253 lb)   BMI 32.48 kg/m    BMI= Body mass index is 32.48 kg/m .  Wt Readings from Last 3 Encounters:   06/20/24 114.8 kg (253 lb)   05/02/24 112.5 kg (248 lb)   04/24/24 113.4 kg (250 lb)       General: pleasant male. No acute distress.   Neck: No JVD  Lungs: clear to auscultation  COR: normal rate, regular rhythm, No murmurs, rubs, or gallops  Extrem: No edema      "   Please refer above for cardiac ROS details.       Past History     Family History:   Family History   Problem Relation Age of Onset    Bronchitis Mother     Other - See Comments Mother         blood clot - emboli to brain    Cerebrovascular Disease Mother     Tremor Mother     Lung Cancer Father     Brain Cancer Sister     Tremor Sister     Other - See Comments Sister         [place]    Hypertension Brother     Other - See Comments Brother         [place]    Tremor Brother     Arthritis Brother     Other - See Comments Brother         [place]    Tremor Brother     Other - See Comments Brother         [place]    Other - See Comments Other         step daughter in [place]    Other - See Comments Grandson         great grand daughter    Other - See Comments Granddaughter         great grand son       Social History:   Social History     Socioeconomic History    Marital status:      Spouse name: Not on file    Number of children: Not on file    Years of education: Not on file    Highest education level: Not on file   Occupational History    Not on file   Tobacco Use    Smoking status: Former    Smokeless tobacco: Never    Tobacco comments:     former smoker 2014   Substance and Sexual Activity    Alcohol use: Not on file    Drug use: Not Currently    Sexual activity: Not on file   Other Topics Concern    Not on file   Social History Narrative    Medical history is significant for essential hypertension, bipolar disorder type 1, abnormal involuntary movement, acquired trigger finger, asthma, carpal tunnel syndrome, degenerative joint disease, diabetes mellitus type 2, familial tremor, history of atrial fibrillation, history of hepatitis C, hyperlipidemia, lower urinary tract symptoms, migraine headache, obesity, obstructive sleep apnea, osteoarthritis, polysubstance dependence, recurrent major depressive episodes, lobectomy of lung, and tubular adenoma of colon. He sustained a  cerebrovascular accident due to stenosis of left middle cerebral artery in 2019. He denied history of concussion, traumatic brain injury, and seizure. He reported remote history of migraines. 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 Family medical and psychiatric history is significant for tremor (mother, 2 brothers), brain tumor (sister), and alcohol use disorder (maternal grandfather, paternal uncles).      Social Determinants of Health     Financial Resource Strain: Not At Risk (6/18/2023)    [facility]    Financial Resource Strain     Is it hard for you to pay for the very basics like food, housing, medical care or heating?: No   Food Insecurity: Not At Risk (6/18/2023)    [facility]    Food Insecurity     Does your food run out before you have the money to buy more?: No   Transportation Needs: Not At Risk (6/18/2023)    [facility]    Transportation Needs     Does a lack of transportation keep you from your medical appointments or from getting your medications?: No   Physical Activity: Insufficiently Active (2/28/2022)    Received from [facility]    Exercise Vital Sign     Days of Exercise per Week: 1 day     Minutes of Exercise per Session: 20 min   Stress: No Stress Concern Present (2/28/2022)    Received from [facility]    [employer] [place] [employer] - Occupational Stress Questionnaire     Feeling of Stress : Not at all   Social Connections: Moderately Integrated (2/28/2022)    Received from [facility]    Social Connection and Isolation Panel [NHANES]     Frequency of Communication with Friends and Family: Three times a week     Frequency of Social Gatherings with Friends and Family: Three times a week     Attends Scientology Services: Never     Active Member of Clubs or Organizations: Yes     Attends Club or Organization Meetings: More than 4 times per year     Marital Status:    Interpersonal Safety: Not At Risk (2/28/2022)     Received from [facility]    Humiliation, Afraid, Rape, and Kick questionnaire     Fear of Current or Ex-Partner: No     Emotionally Abused: No     Physically Abused: No     Sexually Abused: No   Housing Stability: Low Risk  (2/28/2022)    Received from [facility]    Housing Stability Vital Sign     Unable to Pay for Housing in the Last Year: No     In the last 12 months, how many places have you lived?: 1     In the last 12 months, was there a time when you did not have a steady place to sleep or slept in a shelter (including now)?: No            Lab Results    Chemistry/lipid CBC Cardiac Enzymes/BNP/TSH/INR   Lab Results   Component Value Date    CHOL 127 04/30/2024    HDL 40 04/30/2024    TRIG 89 04/30/2024    BUN 9.7 04/30/2024     04/30/2024    CO2 26 04/30/2024    Lab Results   Component Value Date    WBC 5.4 04/30/2024    HGB 13.8 04/30/2024    HCT 41.1 04/30/2024    MCV 95 04/30/2024     04/30/2024    Lab Results   Component Value Date    INR 1.2 (H) 05/02/2022                Thank you for allowing me to participate in the care of your patient.      Sincerely,     Tai Fajardo MD     [facility]  cc:   Tai Fajardo MD  [address], [address]  [place] [address]

## 2024-09-29 ENCOUNTER — HEALTH MAINTENANCE LETTER (OUTPATIENT)
Age: 69
End: 2024-09-29

## 2024-10-21 DIAGNOSIS — I25.118 CORONARY ARTERY DISEASE OF NATIVE ARTERY OF NATIVE HEART WITH STABLE ANGINA PECTORIS (H): ICD-10-CM

## 2024-10-22 RX ORDER — ATORVASTATIN CALCIUM 40 MG/1
80 TABLET, FILM COATED ORAL DAILY
Qty: 180 TABLET | Refills: 1 | Status: SHIPPED | OUTPATIENT
Start: 2024-10-22

## 2025-01-18 ENCOUNTER — HEALTH MAINTENANCE LETTER (OUTPATIENT)
Age: 70
End: 2025-01-18

## 2025-04-17 DIAGNOSIS — I48.19 PERSISTENT ATRIAL FIBRILLATION (H): ICD-10-CM

## 2025-04-17 RX ORDER — METOPROLOL SUCCINATE 25 MG/1
25 TABLET, EXTENDED RELEASE ORAL DAILY
Qty: 90 TABLET | Refills: 0 | Status: SHIPPED | OUTPATIENT
Start: 2025-04-17

## 2025-04-27 ENCOUNTER — HEALTH MAINTENANCE LETTER (OUTPATIENT)
Age: 70
End: 2025-04-27

## 2025-05-06 ENCOUNTER — TELEPHONE (OUTPATIENT)
Dept: NEUROLOGY | Facility: CLINIC | Age: 70
End: 2025-05-06
Payer: COMMERCIAL

## 2025-05-06 NOTE — TELEPHONE ENCOUNTER
Received a fax from [facility] notifying us that Truman in the last three months has filled prescriptions from at least 10 different providers or at least 10 different pharmacies in the last 90 days. The communication stated this is for informational purposes only, no follow-up action needed.

## 2025-05-20 DIAGNOSIS — G25.0 ESSENTIAL TREMOR: ICD-10-CM

## 2025-05-20 RX ORDER — PRIMIDONE 250 MG/1
TABLET ORAL
Qty: 180 TABLET | Refills: 3 | Status: SHIPPED | OUTPATIENT
Start: 2025-05-20

## 2025-05-20 NOTE — TELEPHONE ENCOUNTER
Prescription approved per [facility] Refill Protocol.    Patricia Looney RN, BSN  [facility]

## 2025-05-21 ENCOUNTER — OFFICE VISIT (OUTPATIENT)
Dept: NEUROLOGY | Facility: CLINIC | Age: 70
End: 2025-05-21
Payer: COMMERCIAL

## 2025-05-21 VITALS — SYSTOLIC BLOOD PRESSURE: 143 MMHG | HEART RATE: 88 BPM | DIASTOLIC BLOOD PRESSURE: 81 MMHG

## 2025-05-21 DIAGNOSIS — G25.0 ESSENTIAL TREMOR: ICD-10-CM

## 2025-05-21 PROCEDURE — 3079F DIAST BP 80-89 MM HG: CPT | Performed by: NURSE PRACTITIONER

## 2025-05-21 PROCEDURE — 99214 OFFICE O/P EST MOD 30 MIN: CPT | Performed by: NURSE PRACTITIONER

## 2025-05-21 PROCEDURE — G2211 COMPLEX E/M VISIT ADD ON: HCPCS | Performed by: NURSE PRACTITIONER

## 2025-05-21 PROCEDURE — 3077F SYST BP >= 140 MM HG: CPT | Performed by: NURSE PRACTITIONER

## 2025-05-21 RX ORDER — PRIMIDONE 50 MG/1
TABLET ORAL
Qty: 360 TABLET | Refills: 1 | Status: SHIPPED | OUTPATIENT
Start: 2025-05-21

## 2025-05-21 ASSESSMENT — UNIFIED PARKINSONS DISEASE RATING SCALE (UPDRS)
TOETAPPING_RIGHT: (0) NORMAL: NO PROBLEMS.
POSTURAL_STABILITY: (0) NORMAL: NO PROBLEMS. RECOVERS WITH ONE OR TWO STEPS.
LEG_AGILITY_LEFT: (1) SLIGHT: ANY OF THE FOLLOWING: A) THE REGULAR RHYTHM IS BROKEN WITH ONE WITH ONE OR TWO INTERRUPTIONS OR HESITATIONS OF THE MOVEMENT  B) SLIGHT SLOWING  C) THE AMPLITUDE DECREMENTS NEAR THE END OF THE 10 MOVEMENTS.
MOVEMENTS_INTERFERE_WITH_RATINGS: NO
TOTAL_SCORE: 8
LEG_AGILITY_RIGHT: (0) NORMAL: NO PROBLEMS.
FINGER_TAPPING_RIGHT: (0) NORMAL: NO PROBLEMS.
PARKINSONS_MEDS: OFF
SPONTANEITY_OF_MOVEMENT: (1) SLIGHT: SLIGHT GLOBAL SLOWNESS AND POVERTY OF SPONTANEOUS MOVEMENTS.
AMPLITUDE_LLE: (0) NORMAL: NO TREMOR.
DYSKINESIAS_PRESENT: NO
SPEECH: (0) NORMAL: NO SPEECH PROBLEMS.
AMPLITUDE_LUE: (0) NORMAL: NO TREMOR.
ARISING_CHAIR: (1) SLIGHT: ARISING IS SLOWER THAN NORMAL, OR MAY NEED MORE THAN ONE ATTEMPT, OR MAY NEED TO MOVE FORWARD IN THE CHAIR TO ARISE. NO NEED TO USE THE ARMS OF THE CHAIR.
HANDMOVEMENTS_LEFT: (2) MILD: ANY OF THE FOLLOWING: A) 3 TO 5 INTERRUPTIONS DURING TAPPING  B) MILD SLOWING  C) THE AMPLITUDE DECREMENTS MIDWAY IN THE 10-MOVEMENT SEQUENCE.
AMPLITUDE_LIP_JAW: (0) NORMAL: NO TREMOR.
AXIAL_SCORE: 6
TOTAL_SCORE: 23
PRONATION_SUPINATION_RIGHT: (2) MILD: ANY OF THE FOLLOWING: A) 3 TO 5 INTERRUPTIONS DURING TAPPING  B) MILD SLOWING  C) THE AMPLITUDE DECREMENTS MIDWAY IN THE 10-MOVEMENT SEQUENCE.
RIGIDITY_LLE: (1) SLIGHT: RIGIDITY ONLY DETECTED WITH ACTIVATION MANEUVER.
FACIAL_EXPRESSION: (3) MASKED FACIES WITH LIPS PARTED SOME OF THE TIME WHEN THE MOUTH IS AT REST.
FREEZING_GAIT: (0) NORMAL: NO FREEZING.
RIGIDITY_RLE: (0) NORMAL: NO RIGIDITY.
PRONATION_SUPINATION_LEFT: (2) MILD: ANY OF THE FOLLOWING: A) 3 TO 5 INTERRUPTIONS DURING TAPPING  B) MILD SLOWING  C) THE AMPLITUDE DECREMENTS MIDWAY IN THE 10-MOVEMENT SEQUENCE.
AMPLITUDE_RUE: (0) NORMAL: NO TREMOR.
RIGIDITY_NECK: (1) SLIGHT: RIGIDITY ONLY DETECTED WITH ACTIVATION MANEUVER.
RIGIDITY_RUE: (2) MILD: RIGIDITY DETECTED WITHOUT THE ACTIVATION MANEUVER. FULL RANGE OF MOTION IS EASILY ACHIEVED.
TOETAPPING_LEFT: (0) NORMAL: NO PROBLEMS.
GAIT: (0) NORMAL: NO PROBLEMS.
RIGIDITY_LUE: (1) SLIGHT: RIGIDITY ONLY DETECTED WITH ACTIVATION MANEUVER.
HANDMOVEMENTS_RIGHT: (2) MILD: ANY OF THE FOLLOWING: A) 3 TO 5 INTERRUPTIONS DURING TAPPING  B) MILD SLOWING  C) THE AMPLITUDE DECREMENTS MIDWAY IN THE 10-MOVEMENT SEQUENCE.
CONSTANCY_TREMOR_ATREST: (0) NORMAL: NO TREMOR.
POSTURE: (0) NORMAL: NO PROBLEMS.
FINGER_TAPPING_LEFT: (0) NORMAL: NO PROBLEMS.
AMPLITUDE_RLE: (0) NORMAL: NO TREMOR.
TOTAL_SCORE_LEFT: 9

## 2025-05-21 NOTE — PATIENT INSTRUCTIONS
Dear  Truman GARCIA Maxijovi,    Thank you for coming today.  During your visit, we have discussed the following:     __  Continue to stay active.     __  Stay on the same dose of Primidone. I have refilled your Rx.      Movement Disorder-related Medications                   Indication AM Lunch Dinner Meetings   Primidone 250 mg ET 1 1      Primidone 50 mg  ET     1-2   4 tabs before meetings     __ Return in 6 months to see me to Dr. Leon. You may return sooner as needed.     For questions, you may send us a [employer] message or call [phone]    Fax number: [phone]    Felicity Welch DNP, APRN  [facility]

## 2025-05-21 NOTE — NURSING NOTE
Chief Complaint   Patient presents with    Follow Up     Return       Little Flowers MA on 5/21/2025 at 12:39 PM

## 2025-05-21 NOTE — LETTER
2025      Truman Reynolds  [address]  [place] [address]      Dear Colleague,    Thank you for referring your patient, Truman Reynolds, to the [facility] NEUROLOG[facility]IC [facility]. Please see a copy of my visit note below.      ASSESSMENT:    Essential Tremor: No parkinsonian tremor noted.       PLAN:    __  The following patient instructions provided: -     __  Continue to stay active.     __  Stay on the same dose of Primidone. I have refilled your Rx.      Movement Disorder-related Medications                   Indication AM Lunch Dinner Meetings   Primidone 250 mg ET 1 1      Primidone 50 mg  ET     1-2   4 tabs before meetings     __ Return in 6 months to see me to Dr. Leon. You may return sooner as needed.           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    HPI: Mr. Truman Reynolds is a 69 year old who came to the [facility] accompanied with his wife, Felicia, for a follow up visit.  They live in [place], [place] - 30 min from the clinic.     Pertinent Tremor History: Bilateral hand tremors started in his early 40's.  He has a family history of tremors in his mother. He had no treatment until 2020 at which time he saw Dr. Lee, a neurologist at [facility].  Dr. Lee thought that he had mixed action and rest tremor suggesting essential tremor (ET) & Parkinson's disease (PD).  During his initial evaluation he was started on primidone.     He was seen at [facility] in 2022 for a second opinion of his tremors by Dr. Freeman who also thought that he might have ET and PD. He also suggested switching his lithium to another mood stabilizing agent. Pt was started on Carbidopa/Levodopa 25/100 mg and eventually stopped after taking 750 mg/day and noticing worsening tremors.     He  underwent DBS evaluation in 2022 to improve tremors to be able to write, eat and use the screwdriver.  He was approved for DBS if his psychiatry team provided a support letter. He was informed that due to MCI (mild cognitive impairment), he would be at higher risk of cognitive issues after surgery. He met with Dr. Pinedo to go over your increased risk due to MCI and cardiac issues.     He has been off Lithium. He didn't go forward with DBS as his tremors were controlled being OFF Lithium. He didn't think it was worth the risk.    I saw him last on for a routine follow up visit. During that visit, the following plan was provided:     Mixed Action and Resting Tremor:  Pt is more affected by the action tremor and has been treated with Primidone. Since he was off Lithium, the resting and overall tremor have improved.  He is pleased with how he is doing.      __  Continue to stay active.   __  Stay on the same dose of Primidone. Okay to use Primidone 400 mg before your AA meetings on Wednesdays. I have refilled your Rx.      Movement Disorder-related Medications                   Indication AM Lunch Dinner   Primidone 250 mg ET 1 1     Primidone 50 mg  ET     1-2     __ Return in 6 months to see Dr. Leon. You may return sooner as needed.     Today, he reports that his hand tremor is controlled on current medication regimen. When continues going to his AA meetings on Wednesday nights.      He walks daily. He is active with his grand children.     Pertinent Medications Indication 8 am 1 pm 5-6 pm   Metoprolol ER 25 mg Heart 1     Primidone 250 mg  ET 1 1    Primidone 50 mg ET   2 daily  4 on Wednesdays     He had a fall and injured his Rt shoulder.  He got a steroid injection at the ortho clinic and is doing PT. He doesn't have pain.     Overall balance is good.      MEDICATIONS:   Current Outpatient Medications   Medication Sig Dispense Refill     albuterol (PROVENTIL) (2.5 MG/3ML) 0.083% neb solution 2.5 mg by  "Nebulization route every 6 hours as needed for Wheezing.       amitriptyline (ELAVIL) 100 MG tablet Take 2 tablets by mouth daily       amLODIPine (NORVASC) 5 MG tablet Take 1 tablet (5 mg) by mouth daily 90 tablet 3     atorvastatin (LIPITOR) 40 MG tablet Take 2 tablets by mouth once daily 180 tablet 1     EPINEPHrine (ANY BX GENERIC EQUIV) 0.3 MG/0.3ML injection 2-pack Inject 0.3 mg into the muscle as needed       guaiFENesin-codeine (ROBITUSSIN AC) 100-10 MG/5ML solution TAKE 5 TO 10 ML BY MOUTH EVERY 4 HOURS AS NEEDED       insulin aspart (NOVOLOG FLEXPEN) 100 UNIT/ML pen RESULT UNDER 150-INJECT 6 UNITS, IF RESULT -[id number] 7 UNITS, IF RESULTS ARE ABOVE 200 INJECT 8 UNITS       insulin degludec (TRESIBA) 100 UNIT/ML pen Inject 24 Units subcutaneously at bedtime.       ipratropium (ATROVENT) 0.06 % nasal spray Place 2 Sprays into both nostrils 4 times daily as needed for Allergies.       lamoTRIgine (LAMICTAL) 100 MG tablet Take 0.5 tablets by mouth daily       Lancets (ONETOUCH DELICA PLUS [id number]) MISC        loratadine (CLARITIN) 10 MG tablet Take 10 mg by mouth daily as needed       metoprolol succinate ER (TOPROL XL) 25 MG 24 hr tablet Take 1 tablet (25 mg) by mouth daily. Do for follow up for further refills 90 tablet 0     nitroGLYcerin (NITROSTAT) 0.4 MG sublingual tablet One tablet under the tongue every 5 minutes if needed for chest pain. May repeat every 5 minutes for a maximum of 3 doses in 15 minutes\" 25 tablet 3     olmesartan (BENICAR) 40 MG tablet Take 40 mg by mouth daily       olopatadine (PATADAY) 0.2 % ophthalmic solution Place 1 Drop into both eyes daily as needed       polyethylene glycol (MIRALAX) 17 GM/Dose powder Take 17 g by mouth daily as needed       primidone (MYSOLINE) 250 MG tablet Take 1 tablet (250 mg) twice daily in the 8 am and 1 pm 180 tablet 3     primidone (MYSOLINE) 50 MG tablet Take 2 tabs by mouth at bedtime.  May take up to 2 tables as needed = Total 4 tabs/day " 360 tablet 1     ramelteon (ROZEREM) 8 MG tablet Take 8 mg by mouth at bedtime       rivaroxaban ANTICOAGULANT (XARELTO) 20 MG TABS tablet Take 20 mg by mouth daily       Semaglutide, 1 MG/DOSE, (OZEMPIC) 4 MG/3ML pen Inject 1 mg subcutaneously every 7 days.       No current facility-administered medications for this visit.     Facility-Administered Medications Ordered in Other Visits   Medication Dose Route Frequency Provider Last Rate Last Admin     aspirin (ASA) tablet 325 mg  325 mg Oral Once Anand Michelle MD           PHYSICAL EXAM:    VITAL SIGNS:  Blood pressure (!) 143/81, pulse 88..    GENERAL:  Mr. Reynolds is a pleasant  patient who is well-groomed and well-developed sitting comfortably in the exam room without any distress.  Affect is appropriate.    TREMOR ASSESSMENT:   (0=No tremor 1=Slight 2=Mild 3=Moderate 4= Severe)      5/21/2025    12:00 PM   Tremor Motor Scale   Assessment Time 12:55   Medication On   DBS - Right Brain None   DBS - Left Brain None   Head 1   Face & Jaw 0   Voice 0   Outstretched - RIGHT 1.5   Outstretched - LEFT 1   Wingbeating - RIGHT 1.5   Wingbeating - LEFT 1   Kinetic - RIGHT 1.5   Kinetic - LEFT 1.5   Lower Limb - RIGHT 0   Lower Limb - LEFT 0.5   Lower Limb (Max) 0.5   Axial 1           5/21/2025    12:00 PM   UPDRS Motor Scale   Time: 12:54   Medication Off   R Brain DBS: None   L Brain DBS: None   Dyskinesia (LID) No   Did LID interfere No   Speech 0   Facial Expression 3   Rigidity Neck 1   Rigidity RUE 2   Rigidity LUE 1   Rigidity RLE 0   Rigidity LLE 1   Finger Taps R 0   Finger Taps L 0   Hand Mvt R 2   Hand Mvt L 2   Pron-/Supinate R 2   Pron-/Supinate L 2   Toe Tap R 0   Toe Tap L 0   Leg Agility R 0   Leg Agility L 1   Arise From Chair 1   Gait 0   Gait Freezing 0   Postural Stability 0   Posture 0   Global Spont Mvt 1   Postural Tremor RUE 1   Postural Tremor LUE 1   Kinetic Tremor RUE 1   Kinetic Tremor LUE 1   Rest Tremor RUE 0   Rest Tremor LUE 0    Rest Tremor RLE 0   Rest Tremor LLE 0   Rest Tremor Lip/Jaw 0   Rest Tremor Constancy 0   Total Right 8   Total Left 9   Axial Total 6   Total 23       Today I spent 35 minutes caring for the patient. Time was spent in reviewing records, obtaining history, answering questions, examining, refilling/ordering medication, and documentation.    Felicity Welch, ONELIA, APRN  [facility]    The longitudinal plan of care for the diagnosis(es)/condition(s) as documented were addressed during this visit. Due to the added complexity in care, I will continue to support Truman in the subsequent management and with ongoing continuity of care.       Again, thank you for allowing me to participate in the care of your patient.        Sincerely,        AMOS Miller CNP    Electronically signed

## 2025-05-21 NOTE — PROGRESS NOTES
ASSESSMENT:    Essential Tremor: No parkinsonian tremor noted.       PLAN:    __  The following patient instructions provided: -     __  Continue to stay active.     __  Stay on the same dose of Primidone. I have refilled your Rx.      Movement Disorder-related Medications                   Indication AM Lunch Dinner Meetings   Primidone 250 mg ET 1 1      Primidone 50 mg  ET     1-2   4 tabs before meetings     __ Return in 6 months to see me to Dr. Leon. You may return sooner as needed.           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    HPI: Mr. Truman Reynolds is a 69 year old who came to the [facility] accompanied with his wife, Felicia, for a follow up visit.  They live in [place], [place] - 30 min from the clinic.     Pertinent Tremor History: Bilateral hand tremors started in his early 40's.  He has a family history of tremors in his mother. He had no treatment until 2020 at which time he saw Dr. Lee, a neurologist at [facility].  Dr. Lee thought that he had mixed action and rest tremor suggesting essential tremor (ET) & Parkinson's disease (PD).  During his initial evaluation he was started on primidone.     He was seen at [facility] in 2022 for a second opinion of his tremors by Dr. Freeman who also thought that he might have ET and PD. He also suggested switching his lithium to another mood stabilizing agent. Pt was started on Carbidopa/Levodopa 25/100 mg and eventually stopped after taking 750 mg/day and noticing worsening tremors.     He underwent DBS evaluation in  to improve tremors to be able to write, eat and use the screwdriver.  He was approved for DBS if his psychiatry team provided a support letter. He was informed that due to MCI (mild cognitive impairment), he would be at higher risk  of cognitive issues after surgery. He met with Dr. Pinedo to go over your increased risk due to MCI and cardiac issues.     He has been off Lithium. He didn't go forward with DBS as his tremors were controlled being OFF Lithium. He didn't think it was worth the risk.    I saw him last on for a routine follow up visit. During that visit, the following plan was provided:     Mixed Action and Resting Tremor:  Pt is more affected by the action tremor and has been treated with Primidone. Since he was off Lithium, the resting and overall tremor have improved.  He is pleased with how he is doing.      __  Continue to stay active.   __  Stay on the same dose of Primidone. Okay to use Primidone 400 mg before your AA meetings on Wednesdays. I have refilled your Rx.      Movement Disorder-related Medications                   Indication AM Lunch Dinner   Primidone 250 mg ET 1 1     Primidone 50 mg  ET     1-2     __ Return in 6 months to see Dr. Leon. You may return sooner as needed.     Today, he reports that his hand tremor is controlled on current medication regimen. When continues going to his AA meetings on Wednesday nights.      He walks daily. He is active with his grand children.     Pertinent Medications Indication 8 am 1 pm 5-6 pm   Metoprolol ER 25 mg Heart 1     Primidone 250 mg  ET 1 1    Primidone 50 mg ET   2 daily  4 on Wednesdays     He had a fall and injured his Rt shoulder.  He got a steroid injection at the ortho clinic and is doing PT. He doesn't have pain.     Overall balance is good.      MEDICATIONS:   Current Outpatient Medications   Medication Sig Dispense Refill    albuterol (PROVENTIL) (2.5 MG/3ML) 0.083% neb solution 2.5 mg by Nebulization route every 6 hours as needed for Wheezing.      amitriptyline (ELAVIL) 100 MG tablet Take 2 tablets by mouth daily      amLODIPine (NORVASC) 5 MG tablet Take 1 tablet (5 mg) by mouth daily 90 tablet 3    atorvastatin (LIPITOR) 40 MG tablet Take 2 tablets by  "mouth once daily 180 tablet 1    EPINEPHrine (ANY BX GENERIC EQUIV) 0.3 MG/0.3ML injection 2-pack Inject 0.3 mg into the muscle as needed      guaiFENesin-codeine (ROBITUSSIN AC) 100-10 MG/5ML solution TAKE 5 TO 10 ML BY MOUTH EVERY 4 HOURS AS NEEDED      insulin aspart (NOVOLOG FLEXPEN) 100 UNIT/ML pen RESULT UNDER 150-INJECT 6 UNITS, IF RESULT -[id number] 7 UNITS, IF RESULTS ARE ABOVE 200 INJECT 8 UNITS      insulin degludec (TRESIBA) 100 UNIT/ML pen Inject 24 Units subcutaneously at bedtime.      ipratropium (ATROVENT) 0.06 % nasal spray Place 2 Sprays into both nostrils 4 times daily as needed for Allergies.      lamoTRIgine (LAMICTAL) 100 MG tablet Take 0.5 tablets by mouth daily      Lancets (ONETOUCH DELICA PLUS [id number]) MISC       loratadine (CLARITIN) 10 MG tablet Take 10 mg by mouth daily as needed      metoprolol succinate ER (TOPROL XL) 25 MG 24 hr tablet Take 1 tablet (25 mg) by mouth daily. Do for follow up for further refills 90 tablet 0    nitroGLYcerin (NITROSTAT) 0.4 MG sublingual tablet One tablet under the tongue every 5 minutes if needed for chest pain. May repeat every 5 minutes for a maximum of 3 doses in 15 minutes\" 25 tablet 3    olmesartan (BENICAR) 40 MG tablet Take 40 mg by mouth daily      olopatadine (PATADAY) 0.2 % ophthalmic solution Place 1 Drop into both eyes daily as needed      polyethylene glycol (MIRALAX) 17 GM/Dose powder Take 17 g by mouth daily as needed      primidone (MYSOLINE) 250 MG tablet Take 1 tablet (250 mg) twice daily in the 8 am and 1 pm 180 tablet 3    primidone (MYSOLINE) 50 MG tablet Take 2 tabs by mouth at bedtime.  May take up to 2 tables as needed = Total 4 tabs/day 360 tablet 1    ramelteon (ROZEREM) 8 MG tablet Take 8 mg by mouth at bedtime      rivaroxaban ANTICOAGULANT (XARELTO) 20 MG TABS tablet Take 20 mg by mouth daily      Semaglutide, 1 MG/DOSE, (OZEMPIC) 4 MG/3ML pen Inject 1 mg subcutaneously every 7 days.       No current " facility-administered medications for this visit.     Facility-Administered Medications Ordered in Other Visits   Medication Dose Route Frequency Provider Last Rate Last Admin    aspirin (ASA) tablet 325 mg  325 mg Oral Once Anand Michelle MD           PHYSICAL EXAM:    VITAL SIGNS:  Blood pressure (!) 143/81, pulse 88..    GENERAL:  Mr. Reynolds is a pleasant  patient who is well-groomed and well-developed sitting comfortably in the exam room without any distress.  Affect is appropriate.    TREMOR ASSESSMENT:   (0=No tremor 1=Slight 2=Mild 3=Moderate 4= Severe)      5/21/2025    12:00 PM   Tremor Motor Scale   Assessment Time 12:55   Medication On   DBS - Right Brain None   DBS - Left Brain None   Head 1   Face & Jaw 0   Voice 0   Outstretched - RIGHT 1.5   Outstretched - LEFT 1   Wingbeating - RIGHT 1.5   Wingbeating - LEFT 1   Kinetic - RIGHT 1.5   Kinetic - LEFT 1.5   Lower Limb - RIGHT 0   Lower Limb - LEFT 0.5   Lower Limb (Max) 0.5   Axial 1           5/21/2025    12:00 PM   UPDRS Motor Scale   Time: 12:54   Medication Off   R Brain DBS: None   L Brain DBS: None   Dyskinesia (LID) No   Did LID interfere No   Speech 0   Facial Expression 3   Rigidity Neck 1   Rigidity RUE 2   Rigidity LUE 1   Rigidity RLE 0   Rigidity LLE 1   Finger Taps R 0   Finger Taps L 0   Hand Mvt R 2   Hand Mvt L 2   Pron-/Supinate R 2   Pron-/Supinate L 2   Toe Tap R 0   Toe Tap L 0   Leg Agility R 0   Leg Agility L 1   Arise From Chair 1   Gait 0   Gait Freezing 0   Postural Stability 0   Posture 0   Global Spont Mvt 1   Postural Tremor RUE 1   Postural Tremor LUE 1   Kinetic Tremor RUE 1   Kinetic Tremor LUE 1   Rest Tremor RUE 0   Rest Tremor LUE 0   Rest Tremor RLE 0   Rest Tremor LLE 0   Rest Tremor Lip/Jaw 0   Rest Tremor Constancy 0   Total Right 8   Total Left 9   Axial Total 6   Total 23       Today I spent 35 minutes caring for the patient. Time was spent in reviewing records, obtaining history, answering  questions, examining, refilling/ordering medication, and documentation.    Felicity Welch, ONELIA, APRN  [facility]    The longitudinal plan of care for the diagnosis(es)/condition(s) as documented were addressed during this visit. Due to the added complexity in care, I will continue to support Truman in the subsequent management and with ongoing continuity of care.

## 2025-08-02 DIAGNOSIS — I25.118 CORONARY ARTERY DISEASE OF NATIVE ARTERY OF NATIVE HEART WITH STABLE ANGINA PECTORIS: ICD-10-CM

## 2025-08-05 RX ORDER — ATORVASTATIN CALCIUM 40 MG/1
80 TABLET, FILM COATED ORAL DAILY
Qty: 180 TABLET | Refills: 0 | Status: SHIPPED | OUTPATIENT
Start: 2025-08-05

## 2025-08-10 ENCOUNTER — HEALTH MAINTENANCE LETTER (OUTPATIENT)
Age: 70
End: 2025-08-10

## (undated) DEVICE — CATH ANGIO JUDKINS JL3.5 6FRX100CM INFINITI 534618T

## (undated) DEVICE — MANIFOLD KIT ANGIO AUTOMATED 014613

## (undated) DEVICE — SHTH INTRO 0.021IN ID 6FR DIA

## (undated) DEVICE — KIT HAND CONTROL ACIST 014644 AR-P54

## (undated) DEVICE — SYR ANGIOGRAPHY MULTIUSE KIT ACIST 014612

## (undated) DEVICE — VALVE HEMOSTASIS .096" COPILOT MECH 1003331

## (undated) DEVICE — CUSTOM PACK CORONARY SAN5BCRHEA

## (undated) DEVICE — SLEEVE TR BAND RADIAL COMPRESSION DEVICE 29CM XX-RF06L

## (undated) DEVICE — ELECTRODE DEFIB CADENCE 22550R

## (undated) DEVICE — CATH ANGIO JUDKINS R4 6FRX100CM INFINITI 534621T

## (undated) DEVICE — GUIDEWIRE VASCULAR COMET II 185CML PRESSURE H74939359110

## (undated) RX ORDER — FENTANYL CITRATE 50 UG/ML
INJECTION, SOLUTION INTRAMUSCULAR; INTRAVENOUS
Status: DISPENSED
Start: 2024-05-02

## (undated) RX ORDER — ASPIRIN 325 MG
TABLET ORAL
Status: DISPENSED
Start: 2024-05-02

## (undated) RX ORDER — DIAZEPAM 5 MG
TABLET ORAL
Status: DISPENSED
Start: 2024-05-02